# Patient Record
Sex: FEMALE | Race: WHITE | Employment: UNEMPLOYED | URBAN - METROPOLITAN AREA
[De-identification: names, ages, dates, MRNs, and addresses within clinical notes are randomized per-mention and may not be internally consistent; named-entity substitution may affect disease eponyms.]

---

## 2017-02-22 DIAGNOSIS — I25.10 CORONARY ARTERY DISEASE INVOLVING NATIVE CORONARY ARTERY OF NATIVE HEART WITHOUT ANGINA PECTORIS: ICD-10-CM

## 2017-02-22 RX ORDER — ATORVASTATIN CALCIUM 40 MG/1
40 TABLET, FILM COATED ORAL DAILY
Qty: 90 TABLET | Refills: 1 | Status: SHIPPED | OUTPATIENT
Start: 2017-02-22 | End: 2017-07-13

## 2017-02-23 DIAGNOSIS — I25.10 CORONARY ARTERY DISEASE INVOLVING NATIVE CORONARY ARTERY OF NATIVE HEART WITHOUT ANGINA PECTORIS: ICD-10-CM

## 2017-02-23 NOTE — TELEPHONE ENCOUNTER
Lisinopril      Last Written Prescription Date: 11/29/16  Last Fill Quantity: 90, # refills: 0  Last Office Visit with G, P or [facility] prescribing provider: 01/04/16       Potassium   Date Value Ref Range Status   09/15/2016 4.6 3.4 - 5.3 mmol/L Final     Creatinine   Date Value Ref Range Status   09/15/2016 0.78 0.52 - 1.04 mg/dL Final     BP Readings from Last 3 Encounters:   09/15/16 130/88   03/24/16 112/74   02/11/16 116/76

## 2017-02-27 RX ORDER — LISINOPRIL 5 MG/1
5 TABLET ORAL DAILY
Qty: 30 TABLET | Refills: 0 | Status: SHIPPED | OUTPATIENT
Start: 2017-02-27 | End: 2017-03-28

## 2017-03-07 DIAGNOSIS — F32.1 MAJOR DEPRESSIVE DISORDER, SINGLE EPISODE, MODERATE (H): ICD-10-CM

## 2017-03-07 NOTE — TELEPHONE ENCOUNTER
Lexapro     Last Written Prescription Date: 06/08/16  Last Fill Quantity: 90, # refills: 2  [place] [facility] [place]:  01/04/16        Last PHQ-9 score on record=   PHQ-9 SCORE 5/19/2015   Total Score 10

## 2017-03-09 RX ORDER — ESCITALOPRAM OXALATE 20 MG/1
20 TABLET ORAL DAILY
Qty: 90 TABLET | Refills: 0 | Status: SHIPPED | OUTPATIENT
Start: 2017-03-09 | End: 2017-06-05

## 2017-03-10 NOTE — TELEPHONE ENCOUNTER
Call and advise I will do one more fill but she has cancelled 2 appts. She will have to be seen before any more refills.

## 2017-03-13 DIAGNOSIS — R07.89 OTHER CHEST PAIN: ICD-10-CM

## 2017-03-13 NOTE — TELEPHONE ENCOUNTER
Amlodipine      Last Written Prescription Date: 02/11/16  Last Fill Quantity: 90, # refills: 3    Last Office Visit with G, P or [facility] prescribing provider:  01/04/16   Future Office Visit:        BP Readings from Last 3 Encounters:   09/15/16 130/88   03/24/16 112/74   02/11/16 116/76

## 2017-03-14 RX ORDER — AMLODIPINE BESYLATE 5 MG/1
5 TABLET ORAL DAILY
Qty: 30 TABLET | Refills: 0 | Status: SHIPPED | OUTPATIENT
Start: 2017-03-14 | End: 2017-04-06

## 2017-03-14 NOTE — TELEPHONE ENCOUNTER
Pt called and reached VM  Message left to call back to make and keep appt with Dr. Carter.  No more refills until an appt is completed.    Randy WILSON RN

## 2017-03-28 DIAGNOSIS — I25.10 CORONARY ARTERY DISEASE INVOLVING NATIVE CORONARY ARTERY OF NATIVE HEART WITHOUT ANGINA PECTORIS: ICD-10-CM

## 2017-03-28 RX ORDER — LISINOPRIL 5 MG/1
5 TABLET ORAL DAILY
Qty: 30 TABLET | Refills: 0 | Status: SHIPPED | OUTPATIENT
Start: 2017-03-28 | End: 2017-04-06

## 2017-03-28 NOTE — TELEPHONE ENCOUNTER
Lisinopril      Last Written Prescription Date: 02/27/17  Last Fill Quantity: 30, # refills: 0  Last Office Visit with G, P or [facility] prescribing provider: 01/04/16  Next 5 appointments (look out 90 days)     Apr 06, 2017  8:20 AM CDT   PHYSICAL with Gabriella Carter MD   [facility] ([facility]    [address]  [facility] [id number] [phone]                   Potassium   Date Value Ref Range Status   09/15/2016 4.6 3.4 - 5.3 mmol/L Final     Creatinine   Date Value Ref Range Status   09/15/2016 0.78 0.52 - 1.04 mg/dL Final     BP Readings from Last 3 Encounters:   09/15/16 130/88   03/24/16 112/74   02/11/16 116/76     Medication is being filled for 1 time refill only due to:  upcoming appt

## 2017-04-06 ENCOUNTER — OFFICE VISIT (OUTPATIENT)
Dept: INTERNAL MEDICINE | Facility: CLINIC | Age: 52
End: 2017-04-06
Payer: COMMERCIAL

## 2017-04-06 VITALS
BODY MASS INDEX: 24.92 KG/M2 | SYSTOLIC BLOOD PRESSURE: 100 MMHG | TEMPERATURE: 98 F | HEART RATE: 74 BPM | HEIGHT: 64 IN | WEIGHT: 146 LBS | OXYGEN SATURATION: 96 % | DIASTOLIC BLOOD PRESSURE: 70 MMHG

## 2017-04-06 DIAGNOSIS — R10.13 EPIGASTRIC PAIN: ICD-10-CM

## 2017-04-06 DIAGNOSIS — R07.89 OTHER CHEST PAIN: ICD-10-CM

## 2017-04-06 DIAGNOSIS — Z12.31 ENCOUNTER FOR SCREENING MAMMOGRAM FOR BREAST CANCER: ICD-10-CM

## 2017-04-06 DIAGNOSIS — F33.41 RECURRENT MAJOR DEPRESSIVE DISORDER, IN PARTIAL REMISSION (H): ICD-10-CM

## 2017-04-06 DIAGNOSIS — Z12.11 SCREEN FOR COLON CANCER: ICD-10-CM

## 2017-04-06 DIAGNOSIS — I25.10 CORONARY ARTERY DISEASE INVOLVING NATIVE CORONARY ARTERY OF NATIVE HEART WITHOUT ANGINA PECTORIS: ICD-10-CM

## 2017-04-06 DIAGNOSIS — R73.09 ABNORMAL BLOOD SUGAR: ICD-10-CM

## 2017-04-06 DIAGNOSIS — E78.5 HYPERLIPIDEMIA LDL GOAL <70: ICD-10-CM

## 2017-04-06 DIAGNOSIS — R53.83 FATIGUE, UNSPECIFIED TYPE: ICD-10-CM

## 2017-04-06 DIAGNOSIS — Z00.00 ENCOUNTER FOR ROUTINE ADULT HEALTH EXAMINATION WITHOUT ABNORMAL FINDINGS: Primary | ICD-10-CM

## 2017-04-06 LAB
ANION GAP SERPL CALCULATED.3IONS-SCNC: 4 MMOL/L (ref 3–14)
BASOPHILS # BLD AUTO: 0 10E9/L (ref 0–0.2)
BASOPHILS NFR BLD AUTO: 0.3 %
BUN SERPL-MCNC: 15 MG/DL (ref 7–30)
CALCIUM SERPL-MCNC: 9.2 MG/DL (ref 8.5–10.1)
CHLORIDE SERPL-SCNC: 107 MMOL/L (ref 94–109)
CHOLEST SERPL-MCNC: 167 MG/DL
CO2 SERPL-SCNC: 29 MMOL/L (ref 20–32)
CREAT SERPL-MCNC: 0.86 MG/DL (ref 0.52–1.04)
DIFFERENTIAL METHOD BLD: NORMAL
EOSINOPHIL # BLD AUTO: 0.2 10E9/L (ref 0–0.7)
EOSINOPHIL NFR BLD AUTO: 3 %
ERYTHROCYTE [DISTWIDTH] IN BLOOD BY AUTOMATED COUNT: 13.8 % (ref 10–15)
GFR SERPL CREATININE-BSD FRML MDRD: 69 ML/MIN/1.7M2
GLUCOSE SERPL-MCNC: 114 MG/DL (ref 70–99)
HBA1C MFR BLD: 5.9 % (ref 4.3–6)
HCT VFR BLD AUTO: 42.5 % (ref 35–47)
HDLC SERPL-MCNC: 55 MG/DL
HGB BLD-MCNC: 14 G/DL (ref 11.7–15.7)
LDLC SERPL CALC-MCNC: 67 MG/DL
LYMPHOCYTES # BLD AUTO: 2.5 10E9/L (ref 0.8–5.3)
LYMPHOCYTES NFR BLD AUTO: 39.3 %
MCH RBC QN AUTO: 30.8 PG (ref 26.5–33)
MCHC RBC AUTO-ENTMCNC: 32.9 G/DL (ref 31.5–36.5)
MCV RBC AUTO: 93 FL (ref 78–100)
MONOCYTES # BLD AUTO: 0.6 10E9/L (ref 0–1.3)
MONOCYTES NFR BLD AUTO: 9.8 %
NEUTROPHILS # BLD AUTO: 3.1 10E9/L (ref 1.6–8.3)
NEUTROPHILS NFR BLD AUTO: 47.6 %
NONHDLC SERPL-MCNC: 112 MG/DL
PLATELET # BLD AUTO: 273 10E9/L (ref 150–450)
POTASSIUM SERPL-SCNC: 4.3 MMOL/L (ref 3.4–5.3)
RBC # BLD AUTO: 4.55 10E12/L (ref 3.8–5.2)
SODIUM SERPL-SCNC: 140 MMOL/L (ref 133–144)
T4 FREE SERPL-MCNC: 0.83 NG/DL (ref 0.76–1.46)
TRIGL SERPL-MCNC: 227 MG/DL
TSH SERPL DL<=0.005 MIU/L-ACNC: 10.94 MU/L (ref 0.4–4)
WBC # BLD AUTO: 6.4 10E9/L (ref 4–11)

## 2017-04-06 PROCEDURE — 84439 ASSAY OF FREE THYROXINE: CPT | Performed by: INTERNAL MEDICINE

## 2017-04-06 PROCEDURE — 36415 COLL VENOUS BLD VENIPUNCTURE: CPT | Performed by: INTERNAL MEDICINE

## 2017-04-06 PROCEDURE — 80061 LIPID PANEL: CPT | Performed by: INTERNAL MEDICINE

## 2017-04-06 PROCEDURE — 99396 PREV VISIT EST AGE 40-64: CPT | Performed by: INTERNAL MEDICINE

## 2017-04-06 PROCEDURE — 84443 ASSAY THYROID STIM HORMONE: CPT | Performed by: INTERNAL MEDICINE

## 2017-04-06 PROCEDURE — 85025 COMPLETE CBC W/AUTO DIFF WBC: CPT | Performed by: INTERNAL MEDICINE

## 2017-04-06 PROCEDURE — 86803 HEPATITIS C AB TEST: CPT | Performed by: INTERNAL MEDICINE

## 2017-04-06 PROCEDURE — 80048 BASIC METABOLIC PNL TOTAL CA: CPT | Performed by: INTERNAL MEDICINE

## 2017-04-06 PROCEDURE — G0145 SCR C/V CYTO,THINLAYER,RESCR: HCPCS | Performed by: INTERNAL MEDICINE

## 2017-04-06 PROCEDURE — 99213 OFFICE O/P EST LOW 20 MIN: CPT | Mod: 25 | Performed by: INTERNAL MEDICINE

## 2017-04-06 PROCEDURE — 87624 HPV HI-RISK TYP POOLED RSLT: CPT | Performed by: INTERNAL MEDICINE

## 2017-04-06 PROCEDURE — 83036 HEMOGLOBIN GLYCOSYLATED A1C: CPT | Performed by: INTERNAL MEDICINE

## 2017-04-06 RX ORDER — LISINOPRIL 5 MG/1
5 TABLET ORAL DAILY
Qty: 90 TABLET | Refills: 3 | Status: CANCELLED | OUTPATIENT
Start: 2017-04-06

## 2017-04-06 RX ORDER — ESCITALOPRAM OXALATE 20 MG/1
20 TABLET ORAL DAILY
Qty: 90 TABLET | Refills: 3 | Status: CANCELLED | OUTPATIENT
Start: 2017-04-06

## 2017-04-06 RX ORDER — ATORVASTATIN CALCIUM 40 MG/1
40 TABLET, FILM COATED ORAL DAILY
Qty: 90 TABLET | Refills: 1 | Status: CANCELLED | OUTPATIENT
Start: 2017-04-06

## 2017-04-06 RX ORDER — LISINOPRIL 5 MG/1
5 TABLET ORAL DAILY
Qty: 90 TABLET | Refills: 1 | Status: SHIPPED | OUTPATIENT
Start: 2017-04-06 | End: 2017-11-01

## 2017-04-06 RX ORDER — CARVEDILOL 6.25 MG/1
6.25 TABLET ORAL 2 TIMES DAILY WITH MEALS
Qty: 180 TABLET | Refills: 3 | Status: CANCELLED | OUTPATIENT
Start: 2017-04-06

## 2017-04-06 RX ORDER — AMLODIPINE BESYLATE 5 MG/1
5 TABLET ORAL DAILY
Qty: 90 TABLET | Refills: 1 | Status: SHIPPED | OUTPATIENT
Start: 2017-04-06 | End: 2017-10-03

## 2017-04-06 RX ORDER — AMLODIPINE BESYLATE 5 MG/1
5 TABLET ORAL DAILY
Qty: 90 TABLET | Refills: 3 | Status: CANCELLED | OUTPATIENT
Start: 2017-04-06

## 2017-04-06 NOTE — MR AVS SNAPSHOT
After Visit Summary   4/6/2017    Fallon Pizano    MRN: [id number]           Patient Information     Date Of Birth          1965        Visit Information        Provider Department      4/6/2017 8:20 AM Gabriella Carter MD [facility]        Today's Diagnoses     Encounter for routine adult health examination without abnormal findings    -  1    Screen for colon cancer        Encounter for screening mammogram for breast cancer        Abnormal blood sugar        Hyperlipidemia LDL goal <70        Coronary artery disease involving native coronary artery of native heart without angina pectoris        Recurrent major depressive disorder, in partial remission (H)        Fatigue, unspecified type          Care Instructions    Decrease lexapro to 10 mg (1/2 tablet) daily for up to a week. If still sleep issues you can try stopping it. Call with update after that.     Try omeprazole 20 mg daily; start with a 2 week pack. If not helping by the end, let me know.   If helps, you may need to take for 6 weeks. If symptoms return, try famotidine (a milder acid blocker) 20 mg daily.      PREVENTIVE HEALTH RECOMMENDATIONS:     Vaccines: Get a flu shot each year. Get a tetanus shot every 10 years.     Exercise for at least 150 minutes a week (an average of 30 minutes a day, 5 days of the week). This will help you control your weight and prevent disease.    Limit alcohol to one drink per day.    No smoking.     Wear sunscreen to prevent skin cancer.     See your dentist twice a year for an exam and cleaning.    Try to get Calcium 1200 mg total per day. It is best to not take it all at once. Try to get Vitamin D at least 1000 units per day.    BMI or Body Mass Index is a way of indicating weight and health risk for cardiovascular diseases, high blood pressure, diabetes.   Definitions:    Underweight is less than 18.5 and will be associated with health risk.   Normal BMI is 18.5 to  25   Overweight is 25-29   Obesity is 30 or greater   Morbid Obesity is 40 or greater or 35 or greater with diabetes or high blood pressure  Obesity and Morbid Obesity are associated with higher health risks. Lowering calories, exercising more may lower your BMI and even small decreases can have positive impact on lowering health risks.   Your Body mass index is 25.06 kg/(m^2)..           Follow-ups after your visit        Additional Services     GASTROENTEROLOGY ADULT REF PROCEDURE ONLY       Last Lab Result: Creatinine (mg/dL)       Date                     Value                 09/15/2016               0.78             ----------  There is no height or weight on file to calculate BMI.     Needed:  No  Language:  English    Patient will be contacted to schedule procedure.     Please be aware that coverage of these services is subject to the terms and limitations of your health insurance plan.  Call member services at your health plan with any benefit or coverage questions.  Any procedures must be performed at a [place] facility OR coordinated by your clinic's referral office.    Please bring the following with you to your appointment:    (1) Any X-Rays, CTs or MRIs which have been performed.  Contact the facility where they were done to arrange for  prior to your scheduled appointment.    (2) List of current medications   (3) This referral request   (4) Any documents/labs given to you for this referral                  Future tests that were ordered for you today     Open Future Orders        Priority Expected Expires Ordered    Mammo Screening digital (bilat) Routine  4/6/2018 4/6/2017            Who to contact     If you have questions or need follow up information about today's clinic visit or your schedule please contact [facility] directly at [phone].  Normal or non-critical lab and imaging results will be communicated to you by MyChart, letter or phone within 4  "business days after the clinic has received the results. If you do not hear from us within 7 days, please contact the clinic through [employer]Page[employer] or phone. If you have a critical or abnormal lab result, we will notify you by phone as soon as possible.  Submit refill requests through [employer] or call your pharmacy and they will forward the refill request to us. Please allow 3 business days for your refill to be completed.          Additional Information About Your Visit        OnePage[employer]M Information     [employer] lets you send messages to your doctor, view your test results, renew your prescriptions, schedule appointments and more. To sign up, go to [email or web address] . Click on \"Log in\" on the left side of the screen, which will take you to the Welcome page. Then click on \"Sign up Now\" on the right side of the page.     You will be asked to enter the access code listed below, as well as some personal information. Please follow the directions to create your username and password.     Your access code is: [email or web address]  Expires: 2017  9:25 AM     Your access code will  in 90 days. If you need help or a new code, please call your [place] clinic or [phone].        Care EveryWhere ID     This is your Care EveryWhere ID. This could be used by other organizations to access your [place] medical records  [id number]        Your Vitals Were     Pulse Temperature Height Last Period Pulse Oximetry BMI (Body Mass Index)    74 98  F (36.7  C) (Oral) 5' 4\" (1.626 m) 2013 96% 25.06 kg/m2       Blood Pressure from Last 3 Encounters:   17 100/70   09/15/16 130/88   16 112/74    Weight from Last 3 Encounters:   17 146 lb (66.2 kg)   16 143 lb 9.6 oz (65.1 kg)   16 143 lb 11.2 oz (65.2 kg)              We Performed the Following     Basic metabolic panel     CBC with platelets differential     GASTROENTEROLOGY ADULT REF PROCEDURE ONLY     Hemoglobin A1c     Hepatitis C Screen Reflex to HCV " RNA Quant and Genotype     HPV High Risk Types DNA Cervical     Lipid Profile with reflex to direct LDL     Pap imaged thin layer screen with HPV - recommended age 30 - 65     TSH with free T4 reflex          Today's Medication Changes          These changes are accurate as of: 4/6/17  9:25 AM.  If you have any questions, ask your nurse or doctor.               Stop taking these medicines if you haven't already. Please contact your care team if you have questions.     diphenhydrAMINE 25 MG capsule   Commonly known as:  BENADRYL   Stopped by:  Gabriella Carter MD           oxyCODONE 5 MG IR tablet   Commonly known as:  ROXICODONE   Stopped by:  Gabriella Carter MD                    Primary Care Provider Office Phone # Fax #    Gabriella Carter [phone]       [facility] [address]  [facility] [address]        Thank you!     Thank you for choosing [facility]  for your care. Our goal is always to provide you with excellent care. Hearing back from our patients is one way we can continue to improve our services. Please take a few minutes to complete the written survey that you may receive in the mail after your visit with us. Thank you!             Your Updated Medication List - Protect others around you: Learn how to safely use, store and throw away your medicines at www.disposemymeds.org.          This list is accurate as of: 4/6/17  9:25 AM.  Always use your most recent med list.                   Brand Name Dispense Instructions for use    amLODIPine 5 MG tablet    NORVASC    30 tablet    Take 1 tablet (5 mg) by mouth daily       aspirin 81 MG tablet     90 tablet    Take 1 tablet by mouth daily.       atorvastatin 40 MG tablet    LIPITOR    90 tablet    Take 1 tablet (40 mg) by mouth daily       carvedilol 6.25 MG tablet    COREG    180 tablet    Take 1 tablet (6.25 mg) by mouth 2 times daily (with meals)       escitalopram 20 MG tablet    LEXAPRO    90 tablet     Take 1 tablet (20 mg) by mouth daily 6/8/16 Pt is due back to clinic for appt/labs March 2017       lisinopril 5 MG tablet    PRINIVIL/ZESTRIL    30 tablet    Take 1 tablet (5 mg) by mouth daily       melatonin 3 MG tablet      Take 3 mg by mouth nightly as needed for sleep       nitroglycerin 0.4 MG sublingual tablet    NITROSTAT    25 tablet    Place 1 tablet (0.4 mg) under the tongue every 5 minutes as needed for chest pain

## 2017-04-06 NOTE — PATIENT INSTRUCTIONS
Decrease lexapro to 10 mg (1/2 tablet) daily for up to a week. If still sleep issues you can try stopping it. Call with update after that.     Try omeprazole 20 mg daily; start with a 2 week pack. If not helping by the end, let me know.   If helps, you may need to take for 6 weeks. If symptoms return, try famotidine (a milder acid blocker) 20 mg daily.      PREVENTIVE HEALTH RECOMMENDATIONS:     Vaccines: Get a flu shot each year. Get a tetanus shot every 10 years.     Exercise for at least 150 minutes a week (an average of 30 minutes a day, 5 days of the week). This will help you control your weight and prevent disease.    Limit alcohol to one drink per day.    No smoking.     Wear sunscreen to prevent skin cancer.     See your dentist twice a year for an exam and cleaning.    Try to get Calcium 1200 mg total per day. It is best to not take it all at once. Try to get Vitamin D at least 1000 units per day.    BMI or Body Mass Index is a way of indicating weight and health risk for cardiovascular diseases, high blood pressure, diabetes.   Definitions:    Underweight is less than 18.5 and will be associated with health risk.   Normal BMI is 18.5 to 25   Overweight is 25-29   Obesity is 30 or greater   Morbid Obesity is 40 or greater or 35 or greater with diabetes or high blood pressure  Obesity and Morbid Obesity are associated with higher health risks. Lowering calories, exercising more may lower your BMI and even small decreases can have positive impact on lowering health risks.   Your Body mass index is 25.06 kg/(m^2)..

## 2017-04-06 NOTE — PROGRESS NOTES
SUBJECTIVE:     CC: Fallon Pizano is an 51 year old woman who presents for preventive health visit.     Healthy Habits:    Do you get at least three servings of calcium containing foods daily (dairy, green leafy vegetables, etc.)? yes    Amount of exercise or daily activities, outside of work: no    Problems taking medications regularly No    Medication side effects: No    Have you had an eye exam in the past two years? yes    Do you see a dentist twice per year? yes    Do you have sleep apnea, excessive snoring or daytime drowsiness?no      Current concerns:   1. Fatigue: some issues with getting to sleep. She is aware of weird dreams, awakens several times. This has been going on a long time. She feels very tired during the day, makes it hard to feel motivated to do things.   2. Epigastric pain: going on for over 6 months. It can be sharp, comes and goes, not every day. No aggravating factors. Duration: few hours. She has not taken anything for it. No nausea. Present most days. Not related to movements.       Today's PHQ-2 Score:   PHQ-2 ( 1999 Pf[employer]) 1/4/2016 5/19/2015   Q1: Little interest or pleasure in doing things 0 2   Q2: Feeling down, depressed or hopeless 0 2   PHQ-2 Score 0 4       Abuse: Current or Past(Physical, Sexual or Emotional)- No  Do you feel safe in your environment - Yes    Social History   Substance Use Topics     Smoking status: Current Some Day Smoker     Types: Cigarettes     Last attempt to quit: 4/2/2011     Smokeless tobacco: Never Used      Comment: smokes occ     Alcohol use 0.0 oz/week     0 Standard drinks or equivalent per week      Comment: Casual     The patient does not drink >3 drinks per day nor >7 drinks per week.    Recent Labs   Lab Test  01/04/16   1019  06/24/15   0814  11/04/14   0949   CHOL  185  171  168   HDL  61  57  56   LDL  73  68  78   TRIG  253*  230*  172*   CHOLHDLRATIO   --   3.0  3.0   NHDL  124   --    --        Reviewed orders with patient.   Reviewed health maintenance and updated orders accordingly - Yes    Mammo Decision Support:  Patient over age 50, mutual decision to screen reflected in health maintenance.    Pertinent mammograms are reviewed under the imaging tab.  History of abnormal Pap smear: NO - age 30- 65 PAP every 3 years recommended    Reviewed and updated as needed this visit by clinical staff  Tobacco  Allergies  Problems  Med Hx  Surg Hx  Fam Hx  Soc Hx      Patient Active Problem List   Diagnosis     Hyperlipidemia LDL goal <70     Major depression     CAD (coronary artery disease)     Allergic rhinitis     Endometrial cells on cervical Pap smear inconsistent w/LMP     Abnormal blood sugar     Current Outpatient Prescriptions   Medication Sig Dispense Refill     lisinopril (PRINIVIL/ZESTRIL) 5 MG tablet Take 1 tablet (5 mg) by mouth daily 30 tablet 0     amLODIPine (NORVASC) 5 MG tablet Take 1 tablet (5 mg) by mouth daily 30 tablet 0     escitalopram (LEXAPRO) 20 MG tablet Take 1 tablet (20 mg) by mouth daily 6/8/16 Pt is due back to clinic for appt/labs March 2017 90 tablet 0     atorvastatin (LIPITOR) 40 MG tablet Take 1 tablet (40 mg) by mouth daily 90 tablet 1     carvedilol (COREG) 6.25 MG tablet Take 1 tablet (6.25 mg) by mouth 2 times daily (with meals) 180 tablet 3     nitroglycerin (NITROSTAT) 0.4 MG SL tablet Place 1 tablet (0.4 mg) under the tongue every 5 minutes as needed for chest pain 25 tablet 0     aspirin 81 MG tablet Take 1 tablet by mouth daily. 90 tablet 3     melatonin 3 MG tablet Take 3 mg by mouth nightly as needed for sleep        Review Of Systems:  Skin: negative  Eyes: negative  Ears/Nose/Throat: nasal congestion, seasonally, more winter takes benadryl for it   Respiratory: No dyspnea on exertion and No cough  Cardiovascular: negative  Gastrointestinal: as above  Genitourinary: negative  Musculoskeletal: negative  Neurologic: negative  Psychiatric: as above  Hematologic/Lymphatic/Immunologic:  "negative  Endocrine: negative   Gynecologic ros reveals:no breast pain or new or enlarging lumps on self exam, no vaginal bleeding, no discharge or pelvic pain    Objective:  Patient alert, in no acute distress  /70  Pulse 74  Temp 98  F (36.7  C) (Oral)  Ht 5' 4\" (1.626 m)  Wt 146 lb (66.2 kg)  LMP 07/18/2013  SpO2 96%  BMI 25.06 kg/m2    HEENT: extraocular movements are intact, pupils equal and reactive to light and accommodation, TMs clear, oropharynx clear  NECK: Neck supple. No adenopathy. Thyroid symmetric, normal size,, Carotids without bruits.  PULMONARY: clear to auscultation  CARDIAC: regular rate and rhythm and no murmurs, clicks, or gallops  PULSES: 2/2 throughout  BACK: no spinal or CVAT  ABDOMINAL: Soft, tender mid-epigastrium without rebound or guarding  Normal bowel sounds.  No hepatosplenomegaly or abnormal masses  BREAST: No breast masses or tenderness, No axillary masses or tenderness and No galactorrhea  PELVIC: external genitalia normal, vaginal mucosa normal, cervix normal, specimen sent for pap, bimanual exam with normal uterus and adnexa,  REFLEXES: 2+ throughout  SKIN: unremarkable     PHQ-9 SCORE 11/4/2014 5/19/2015 4/6/2017   Total Score 3 10 -   Total Score - - 11        ASSESSMENT/PLAN:     1. Encounter for routine adult health examination without abnormal findings    - Basic metabolic panel  - Lipid Profile with reflex to direct LDL  - Hepatitis C Screen Reflex to HCV RNA Quant and Genotype    2. Screen for colon cancer  due  - GASTROENTEROLOGY ADULT REF PROCEDURE ONLY    3. Encounter for screening mammogram for breast cancer  due  - Mammo Screening digital (bilat); Future    4. Abnormal blood sugar  recheck  - Basic metabolic panel  - Hemoglobin A1c    5. Hyperlipidemia LDL goal <70  recheck  - Lipid Profile with reflex to direct LDL    6. Coronary artery disease involving native coronary artery of native heart without angina pectoris  stable    7. Recurrent major " "depressive disorder, in partial remission (H)  Not optimal but may be mostly related to sleep issues which could be due to the Lexapro, hold to see if helps, see patient instructions. It may be worsening depression causing sleep problems though. Consider change in medication.     8. Fatigue, unspecified type  Most likely related to sleep, check a few labs  - TSH with free T4 reflex  - CBC with platelets differential    9. Epigastric pain; may be acid related, trial PPI, consider US if not better, see patient instructions.     COUNSELING:   Reviewed preventive health counseling, as reflected in patient instructions       Consider Hep C screening for patients born between 1945 and 1965         reports that she has been smoking Cigarettes.  She has never used smokeless tobacco.    Estimated body mass index is 24.65 kg/(m^2) as calculated from the following:    Height as of 3/24/16: 5' 4\" (1.626 m).    Weight as of 3/24/16: 143 lb 9.6 oz (65.1 kg).       Counseling Resources:  ATP IV Guidelines  Pooled Cohorts Equation Calculator  Breast Cancer Risk Calculator  FRAX Risk Assessment  ICSI Preventive Guidelines  Dietary Guidelines for Americans, 2010  [employer]'s MyPlate  ASA Prophylaxis  Lung CA Screening    Gabriella Carter MD  [facility]  "

## 2017-04-06 NOTE — NURSING NOTE
"Chief Complaint   Patient presents with     Physical       Initial /70  Pulse 74  Temp 98  F (36.7  C) (Oral)  Ht 5' 4\" (1.626 m)  Wt 146 lb (66.2 kg)  LMP 07/18/2013  SpO2 96%  BMI 25.06 kg/m2 Estimated body mass index is 25.06 kg/(m^2) as calculated from the following:    Height as of this encounter: 5' 4\" (1.626 m).    Weight as of this encounter: 146 lb (66.2 kg).  Medication Reconciliation: complete    "

## 2017-04-07 ENCOUNTER — TELEPHONE (OUTPATIENT)
Dept: INTERNAL MEDICINE | Facility: CLINIC | Age: 52
End: 2017-04-07

## 2017-04-07 DIAGNOSIS — E03.9 ACQUIRED HYPOTHYROIDISM: Primary | ICD-10-CM

## 2017-04-07 LAB — HCV AB SERPL QL IA: NORMAL

## 2017-04-07 RX ORDER — LEVOTHYROXINE SODIUM 25 UG/1
25 TABLET ORAL DAILY
Qty: 90 TABLET | Refills: 1 | Status: SHIPPED | OUTPATIENT
Start: 2017-04-07 | End: 2017-10-27 | Stop reason: DRUGHIGH

## 2017-04-07 ASSESSMENT — PATIENT HEALTH QUESTIONNAIRE - PHQ9: SUM OF ALL RESPONSES TO PHQ QUESTIONS 1-9: 11

## 2017-04-07 NOTE — TELEPHONE ENCOUNTER
Call and advise that her thyroid tests suggest her thyroid gland is not working adequately. This may be causing at least some of her symptoms. She should start on replacement with levothyroxine 25 mcg daily; check instructions to take correctly. Recheck thyroid lab in 6-8 weeks, just a lab only appointment, not fasting.   Her sugar is still above normal but not diabetes. The rest are overall okay. Copy of labs in my basket to mail to her.

## 2017-04-10 LAB
COPATH REPORT: NORMAL
PAP: NORMAL

## 2017-04-12 LAB
FINAL DIAGNOSIS: NORMAL
HPV HR 12 DNA CVX QL NAA+PROBE: NEGATIVE
HPV16 DNA SPEC QL NAA+PROBE: NEGATIVE
HPV18 DNA SPEC QL NAA+PROBE: NEGATIVE
SPECIMEN DESCRIPTION: NORMAL

## 2017-04-12 NOTE — PROGRESS NOTES
Pap done on 4/6/17. Please send nl pap and Neg HPV letter, ([id number]) for pap in 3 years and annual physical in 1 year.   Chart reviewed,  oj.     VALERY Lozano RN

## 2017-05-04 DIAGNOSIS — I25.10 CORONARY ARTERY DISEASE INVOLVING NATIVE CORONARY ARTERY OF NATIVE HEART WITHOUT ANGINA PECTORIS: ICD-10-CM

## 2017-05-05 RX ORDER — LISINOPRIL 5 MG/1
TABLET ORAL
Qty: 90 TABLET | Refills: 0 | OUTPATIENT
Start: 2017-05-05

## 2017-05-13 DIAGNOSIS — I25.10 CAD (CORONARY ARTERY DISEASE): ICD-10-CM

## 2017-05-15 RX ORDER — CARVEDILOL 6.25 MG/1
TABLET ORAL
Qty: 180 TABLET | Refills: 1 | Status: SHIPPED | OUTPATIENT
Start: 2017-05-15 | End: 2017-07-13

## 2017-05-15 NOTE — TELEPHONE ENCOUNTER
Carvedilol       Last Written Prescription Date: 5/9/16  Last Fill Quantity: 180, # refills: 3  Last Office Visit with [facility] or [facility] prescribing provider:  4/6/17   Future Office Visit:        BP Readings from Last 3 Encounters:   04/06/17 100/70   09/15/16 130/88   03/24/16 112/74     Prescription approved per [facility] Refill Protocol.

## 2017-06-05 DIAGNOSIS — F32.1 MAJOR DEPRESSIVE DISORDER, SINGLE EPISODE, MODERATE (H): ICD-10-CM

## 2017-06-05 NOTE — TELEPHONE ENCOUNTER
LEXAPRO     Last Written Prescription Date: 03/09/17  Last Fill Quantity: 90, # refills: 0  Last Office Visit with [facility] primary care provider:  04/06/17        Last PHQ-9 score on record=   PHQ-9 SCORE 4/6/2017   Total Score -   Total Score 11

## 2017-06-06 RX ORDER — ESCITALOPRAM OXALATE 20 MG/1
20 TABLET ORAL DAILY
Qty: 90 TABLET | Refills: 0 | Status: SHIPPED | OUTPATIENT
Start: 2017-06-06 | End: 2017-07-13

## 2017-06-06 NOTE — TELEPHONE ENCOUNTER
Per 4/7/17 phone note-Call and advise that her thyroid tests suggest her thyroid gland is not working adequately. This may be causing at least some of her symptoms. She should start on replacement with levothyroxine 25 mcg daily; check instructions to take correctly. Recheck thyroid lab in 6-8 weeks, just a lab only appointment, not fasting.   Her sugar is still above normal but not diabetes. The rest are overall okay. Copy of labs in my basket to mail to her.

## 2017-07-13 ENCOUNTER — OFFICE VISIT (OUTPATIENT)
Dept: INTERNAL MEDICINE | Facility: CLINIC | Age: 52
End: 2017-07-13
Payer: COMMERCIAL

## 2017-07-13 VITALS
TEMPERATURE: 98.2 F | DIASTOLIC BLOOD PRESSURE: 66 MMHG | HEIGHT: 64 IN | OXYGEN SATURATION: 96 % | HEART RATE: 81 BPM | WEIGHT: 150.1 LBS | SYSTOLIC BLOOD PRESSURE: 92 MMHG | RESPIRATION RATE: 16 BRPM | BODY MASS INDEX: 25.62 KG/M2

## 2017-07-13 DIAGNOSIS — Z12.11 SCREEN FOR COLON CANCER: ICD-10-CM

## 2017-07-13 DIAGNOSIS — Z12.31 ENCOUNTER FOR SCREENING MAMMOGRAM FOR BREAST CANCER: ICD-10-CM

## 2017-07-13 DIAGNOSIS — E03.9 ACQUIRED HYPOTHYROIDISM: ICD-10-CM

## 2017-07-13 DIAGNOSIS — I25.10 CORONARY ARTERY DISEASE INVOLVING NATIVE CORONARY ARTERY OF NATIVE HEART WITHOUT ANGINA PECTORIS: ICD-10-CM

## 2017-07-13 DIAGNOSIS — F33.0 MILD EPISODE OF RECURRENT MAJOR DEPRESSIVE DISORDER (H): ICD-10-CM

## 2017-07-13 DIAGNOSIS — E78.5 HYPERLIPIDEMIA LDL GOAL <70: ICD-10-CM

## 2017-07-13 DIAGNOSIS — G47.00 INSOMNIA, UNSPECIFIED TYPE: Primary | ICD-10-CM

## 2017-07-13 PROCEDURE — 84443 ASSAY THYROID STIM HORMONE: CPT | Performed by: INTERNAL MEDICINE

## 2017-07-13 PROCEDURE — 36415 COLL VENOUS BLD VENIPUNCTURE: CPT | Performed by: INTERNAL MEDICINE

## 2017-07-13 PROCEDURE — 99214 OFFICE O/P EST MOD 30 MIN: CPT | Performed by: INTERNAL MEDICINE

## 2017-07-13 RX ORDER — ZOLPIDEM TARTRATE 5 MG/1
5 TABLET ORAL
Qty: 30 TABLET | Refills: 1 | Status: SHIPPED | OUTPATIENT
Start: 2017-07-13 | End: 2020-03-31

## 2017-07-13 RX ORDER — ATORVASTATIN CALCIUM 40 MG/1
40 TABLET, FILM COATED ORAL DAILY
Qty: 90 TABLET | Refills: 3 | Status: SHIPPED | OUTPATIENT
Start: 2017-07-13 | End: 2018-08-20

## 2017-07-13 RX ORDER — ESCITALOPRAM OXALATE 20 MG/1
20 TABLET ORAL DAILY
Qty: 90 TABLET | Refills: 3 | Status: SHIPPED | OUTPATIENT
Start: 2017-07-13 | End: 2018-09-02

## 2017-07-13 RX ORDER — CARVEDILOL 6.25 MG/1
6.25 TABLET ORAL 2 TIMES DAILY WITH MEALS
Qty: 180 TABLET | Refills: 3 | Status: SHIPPED | OUTPATIENT
Start: 2017-07-13 | End: 2018-08-04

## 2017-07-13 NOTE — PROGRESS NOTES
SUBJECTIVE:                                                    Fallon Pizano is a 52 year old female who presents to clinic today for the following health issues:    Her main concern is malaise:  She reports that she has not been sleeping well for 3 weeks. She is not sure why she started to have trouble with sleep, just was not able to fall asleep, does not think she really has dosed or slept much, then would awaken and stay awake. This had not been bothering her too much the first week but after the second week she started to feel much more tired, malaise, decreased energy, achiness. She states she can finally fall asleep about 11 AM and sleep for 45 hours. She tried melatonin but it did not seem to help her get to sleep or staying asleep, Benadryl can help her sleep but it takes an hour or so to work and then she only gets about 5-6 hours duration. She does get some hangover effect from it and some headache. She has never been on any other sleep aid and has not had significant insomnia problems. She is not feeling like she's had significant stresses recently.    She reports some mild feeling of disequilibrium when up walking. She has occasionally felt mildly nauseous.  She is due for a thyroid recheck, does have some questions about thyroid failure, treatment.    Hyperlipidemia Follow-Up      Rate your low fat/cholesterol diet?: not monitoring fat    Taking statin?  Yes, muscle aches, possibly from other cause    Other lipid medications/supplements?:  none    Hypertension Follow-up      Outpatient blood pressures are being checked at home.  Results are 135/112 last week.    Low Salt Diet: low salt    Hypothyroidism Follow-up    Since last visit, patient describes the following symptoms: fatigue      Amount of exercise or physical activity: None    Problems taking medications regularly: No    Medication side effects: none    Diet: regular (no restrictions)        Patient Active Problem List   Diagnosis      Hyperlipidemia LDL goal <70     Mild episode of recurrent major depressive disorder (H)     CAD (coronary artery disease)     Allergic rhinitis     Abnormal blood sugar     Acquired hypothyroidism       Current Outpatient Prescriptions   Medication Sig Dispense Refill     IBUPROFEN PO Take 200 mg by mouth       DiphenhydrAMINE HCl (BENADRYL PO)        zolpidem (AMBIEN) 5 MG tablet Take 1 tablet (5 mg) by mouth nightly as needed for sleep 30 tablet 1     escitalopram (LEXAPRO) 20 MG tablet Take 1 tablet (20 mg) by mouth daily 90 tablet 0     levothyroxine (SYNTHROID/LEVOTHROID) 25 MCG tablet Take 1 tablet (25 mcg) by mouth daily 90 tablet 1     amLODIPine (NORVASC) 5 MG tablet Take 1 tablet (5 mg) by mouth daily 90 tablet 1     lisinopril (PRINIVIL/ZESTRIL) 5 MG tablet Take 1 tablet (5 mg) by mouth daily 90 tablet 1     atorvastatin (LIPITOR) 40 MG tablet Take 1 tablet (40 mg) by mouth daily 90 tablet 1     carvedilol (COREG) 6.25 MG tablet Take 1 tablet (6.25 mg) by mouth 2 times daily (with meals) 180 tablet 3     nitroglycerin (NITROSTAT) 0.4 MG SL tablet Place 1 tablet (0.4 mg) under the tongue every 5 minutes as needed for chest pain 25 tablet 0     aspirin 81 MG tablet Take 1 tablet by mouth daily. 90 tablet 3     [DISCONTINUED] carvedilol (COREG) 6.25 MG tablet TAKE 1 TABLET BY MOUTH 2 TIMES DAILY WITH MEALS 180 tablet 1     melatonin 3 MG tablet Take 3 mg by mouth nightly as needed for sleep           Social History   Substance Use Topics     Smoking status: Current Some Day Smoker     Types: Cigarettes     Last attempt to quit: 4/2/2011     Smokeless tobacco: Never Used      Comment: smokes occ     Alcohol use 0.0 oz/week     0 Standard drinks or equivalent per week      Comment: Casual        Reviewed and updated as needed this visit by clinical staff       Reviewed and updated as needed this visit by Provider         ROS:  No fever, chills, occasionally nausea, no vomiting, no abdominal pain, no  "constipation, diarrhea, rashes, specific joint pains, it's more muscular achiness. No new dyspnea on exertion, PND, orthopnea or chest pains, no palpitations or racing heartbeats. Her blood pressure was elevated 1 day early on, 135/112 when down gradually, it was in the normal range last time she checked a few weeks ago    OBJECTIVE:     BP 92/66  Pulse 81  Temp 98.2  F (36.8  C) (Oral)  Resp 16  Ht 5' 4\" (1.626 m)  Wt 150 lb 1.6 oz (68.1 kg)  LMP 07/18/2013  SpO2 96%  BMI 25.76 kg/m2  Body mass index is 25.76 kg/(m^2).    Neck: No lymphadenopathy or thyromegaly  Lungs: Clear  CV: normal S1, S2 without murmur, S3 or S4.   Abdomen: Bowel sounds normal, soft, nontender. No hepatosplenomegaly. No masses.   No edema     PHQ-9 SCORE 5/19/2015 4/6/2017 7/13/2017   Total Score 10 - -   Total Score - 11 13      She feels some of the mood issues are related to the lack of sleep, does not feel that her depression is significantly worse    ASSESSMENT/PLAN:             1. Insomnia, unspecified type  I suspect her son is causing much of her fatigue, body ache, malaise. Likely this is more of a stress reaction. Recommend start Ambien for 2 weeks, then wean off. Discussed behavior therapy for sleep problems. If continued issues may consider trazodone  - zolpidem (AMBIEN) 5 MG tablet; Take 1 tablet (5 mg) by mouth nightly as needed for sleep  Dispense: 30 tablet; Refill: 1    2. Mild episode of recurrent major depressive disorder (H)  Overall stable, continue med    3. Coronary artery disease involving native coronary artery of native heart without angina pectoris  Stable, continue med  - atorvastatin (LIPITOR) 40 MG tablet; Take 1 tablet (40 mg) by mouth daily  Dispense: 90 tablet; Refill: 3  - carvedilol (COREG) 6.25 MG tablet; Take 1 tablet (6.25 mg) by mouth 2 times daily (with meals)  Dispense: 180 tablet; Refill: 3    4. Acquired hypothyroidism  Recheck tests, may need adjustment of dose  - TSH with free T4 " reflex    5. Hyperlipidemia LDL goal <70  Stable, continue med    6. Encounter for screening mammogram for breast cancer  due  - MA Screening Digital Bilateral; Future    7. Screen for colon cancer  due  - GASTROENTEROLOGY ADULT REF PROCEDURE ONLY        Gabriella Carter MD  [facility]

## 2017-07-13 NOTE — MR AVS SNAPSHOT
"              After Visit Summary   7/13/2017    Fallon Pizano    MRN: [id number]           Patient Information     Date Of Birth          1965        Visit Information        Provider Department      7/13/2017 10:00 AM Gabriella Carter MD [facility]        Today's Diagnoses     Insomnia, unspecified type    -  1    Coronary artery disease involving native coronary artery of native heart without angina pectoris        Acquired hypothyroidism          Care Instructions    Take ambien 1 tablet every night for about 10 nights. Then take every other night for about a week, then try as needed.     If not sleeping with that, let me know.           Follow-ups after your visit        Who to contact     If you have questions or need follow up information about today's clinic visit or your schedule please contact [facility] directly at [phone].  Normal or non-critical lab and imaging results will be communicated to you by MyChart, letter or phone within 4 business days after the clinic has received the results. If you do not hear from us within 7 days, please contact the clinic through MyChart or phone. If you have a critical or abnormal lab result, we will notify you by phone as soon as possible.  Submit refill requests through yavalu or call your pharmacy and they will forward the refill request to us. Please allow 3 business days for your refill to be completed.          Additional Information About Your Visit        MyChart Information     yavalu lets you send messages to your doctor, view your test results, renew your prescriptions, schedule appointments and more. To sign up, go to [email or web address] . Click on \"Log in\" on the left side of the screen, which will take you to the Welcome page. Then click on \"Sign up Now\" on the right side of the page.     You will be asked to enter the access code listed below, as well as some personal information. Please follow " "the directions to create your username and password.     Your access code is: [email or web address]  Expires: 10/11/2017 10:55 AM     Your access code will  in 90 days. If you need help or a new code, please call your [facility] clinic or [phone].        Care EveryWhere ID     This is your Care EveryWhere ID. This could be used by other organizations to access your [facility] medical records  [id number]        Your Vitals Were     Pulse Temperature Respirations Height Last Period Pulse Oximetry    81 98.2  F (36.8  C) (Oral) 16 5' 4\" (1.626 m) 2013 96%    BMI (Body Mass Index)                   25.76 kg/m2            Blood Pressure from Last 3 Encounters:   17 92/66   17 100/70   09/15/16 130/88    Weight from Last 3 Encounters:   17 150 lb 1.6 oz (68.1 kg)   17 146 lb (66.2 kg)   16 143 lb 9.6 oz (65.1 kg)              We Performed the Following     TSH with free T4 reflex          Today's Medication Changes          These changes are accurate as of: 17 10:55 AM.  If you have any questions, ask your nurse or doctor.               Start taking these medicines.        Dose/Directions    zolpidem 5 MG tablet   Commonly known as:  AMBIEN   Used for:  Insomnia, unspecified type   Started by:  Gabriella Carter MD        Dose:  5 mg   Take 1 tablet (5 mg) by mouth nightly as needed for sleep   Quantity:  30 tablet   Refills:  1         These medicines have changed or have updated prescriptions.        Dose/Directions    carvedilol 6.25 MG tablet   Commonly known as:  COREG   This may have changed:  Another medication with the same name was removed. Continue taking this medication, and follow the directions you see here.   Used for:  Coronary artery disease involving native coronary artery of native heart without angina pectoris   Changed by:  Abhinav Dowd MD        Dose:  6.25 mg   Take 1 tablet (6.25 mg) by mouth 2 times daily (with meals)   Quantity:  180 tablet   Refills:  3    "         Where to get your medicines      Some of these will need a paper prescription and others can be bought over the counter.  Ask your nurse if you have questions.     Bring a paper prescription for each of these medications     zolpidem 5 MG tablet                Primary Care Provider Office Phone # Fax #    Gabriella Carter [phone]       [facility] [address] [facility] [address]  [facility] [address]        Equal Access to Services     BASIM VAN : Hadii aad ku hadasho Soomaali, waaxda luqadaha, qaybta kaalmada adeegyada, waxay idiin hayaan adeeg kharash lajamaal . So [facility] [phone].    ATENCIÓN: Si habla español, tiene a cheung disposición servicios gratuitos de asistencia lingüística. Aryan al [phone].    We comply with applicable federal civil rights laws and [place] laws. We do not discriminate on the basis of race, color, national origin, age, disability sex, sexual orientation or gender identity.            Thank you!     Thank you for choosing [facility]  for your care. Our goal is always to provide you with excellent care. Hearing back from our patients is one way we can continue to improve our services. Please take a few minutes to complete the written survey that you may receive in the mail after your visit with us. Thank you!             Your Updated Medication List - Protect others around you: Learn how to safely use, store and throw away your medicines at www.disposemymeds.org.          This list is accurate as of: 7/13/17 10:55 AM.  Always use your most recent med list.                   Brand Name Dispense Instructions for use Diagnosis    amLODIPine 5 MG tablet    NORVASC    90 tablet    Take 1 tablet (5 mg) by mouth daily    Other chest pain       aspirin 81 MG tablet     90 tablet    Take 1 tablet by mouth daily.        atorvastatin 40 MG tablet    LIPITOR    90 tablet    Take 1 tablet (40 mg) by mouth daily    Coronary artery disease involving  native coronary artery of native heart without angina pectoris       BENADRYL PO           carvedilol 6.25 MG tablet    COREG    180 tablet    Take 1 tablet (6.25 mg) by mouth 2 times daily (with meals)    Coronary artery disease involving native coronary artery of native heart without angina pectoris       escitalopram 20 MG tablet    LEXAPRO    90 tablet    Take 1 tablet (20 mg) by mouth daily    Major depressive disorder, single episode, moderate (H)       IBUPROFEN PO      Take 200 mg by mouth        levothyroxine 25 MCG tablet    SYNTHROID/LEVOTHROID    90 tablet    Take 1 tablet (25 mcg) by mouth daily    Acquired hypothyroidism       lisinopril 5 MG tablet    PRINIVIL/ZESTRIL    90 tablet    Take 1 tablet (5 mg) by mouth daily    Coronary artery disease involving native coronary artery of native heart without angina pectoris       melatonin 3 MG tablet      Take 3 mg by mouth nightly as needed for sleep        nitroGLYcerin 0.4 MG sublingual tablet    NITROSTAT    25 tablet    Place 1 tablet (0.4 mg) under the tongue every 5 minutes as needed for chest pain    CAD (coronary artery disease)       zolpidem 5 MG tablet    AMBIEN    30 tablet    Take 1 tablet (5 mg) by mouth nightly as needed for sleep    Insomnia, unspecified type

## 2017-07-13 NOTE — NURSING NOTE
"Chief Complaint   Patient presents with     Recheck Medication     LOV 04/06/2017- fasting     Sleep Problem     Unable to sleep for 3 weeks- not feeling well. Dizziness, nausea, body aching.        Initial BP 92/66  Pulse 81  Temp 98.2  F (36.8  C) (Oral)  Resp 16  Ht 5' 4\" (1.626 m)  Wt 150 lb 1.6 oz (68.1 kg)  LMP 07/18/2013  SpO2 96%  BMI 25.76 kg/m2 Estimated body mass index is 25.76 kg/(m^2) as calculated from the following:    Height as of this encounter: 5' 4\" (1.626 m).    Weight as of this encounter: 150 lb 1.6 oz (68.1 kg).  Medication Reconciliation: sara Messina CMA    Discussed Health Maintenance:    Patient agreed to schedule Mammogram and colonoscopy. Order have been place and information given to patient.       "

## 2017-07-13 NOTE — PATIENT INSTRUCTIONS
Take ambien 1 tablet every night for about 10 nights. Then take every other night for about a week, then try as needed.     If not sleeping with that, let me know.

## 2017-07-14 LAB — TSH SERPL DL<=0.005 MIU/L-ACNC: 3.36 MU/L (ref 0.4–4)

## 2017-07-14 ASSESSMENT — PATIENT HEALTH QUESTIONNAIRE - PHQ9: SUM OF ALL RESPONSES TO PHQ QUESTIONS 1-9: 13

## 2017-07-15 RX ORDER — LEVOTHYROXINE SODIUM 50 UG/1
50 TABLET ORAL DAILY
Qty: 90 TABLET | Refills: 3 | Status: SHIPPED | OUTPATIENT
Start: 2017-07-15 | End: 2017-10-30

## 2017-08-02 ENCOUNTER — TELEPHONE (OUTPATIENT)
Dept: GASTROENTEROLOGY | Facility: CLINIC | Age: 52
End: 2017-08-02

## 2017-08-02 NOTE — TELEPHONE ENCOUNTER
Third attempt to reach patient to schedule Colonoscopy. Not Scheduled at [facility]. Left Messages.

## 2017-08-22 ENCOUNTER — TELEPHONE (OUTPATIENT)
Dept: INTERNAL MEDICINE | Facility: CLINIC | Age: 52
End: 2017-08-22

## 2017-08-22 NOTE — LETTER
[facility]  [address], [address]  [place], [place]  [address]                                            TEL:[phone]  FAX:[phone]      Fallon Pizano  [address]  [facility] [address]          August 22, 2017    Dear Fallon,    In order to ensure we are providing the best quality care, we have reviewed your chart and see that you are due for:     1. Breast cancer screening call to make an appointment with the Mammogram Breast center     at [phone].  2. Colon Cancer Screening. [facility]. Appt. Line [phone].    Please call the clinic at your earliest convenience to schedule an appointment.   Thank you for trusting us with your health care.     Thank you         Sincerely,      Gabriella Carter M.D.

## 2017-08-22 NOTE — TELEPHONE ENCOUNTER
Panel Management Review      Patient has the following on her problem list: None      Composite cancer screening  Chart review shows that this patient is due/due soon for the following Mammogram and Colonoscopy  Summary:    Patient is due/failing the following:   COLONOSCOPY and MAMMOGRAM    Action needed:   No action require- Discussed HM with patient at both last OV's with provider. Order in- will send reminder letter.    Type of outreach:    Sent letter.    Questions for provider review:    None                                                                                                                                     Lucian Messina CMA       Chart routed to CLOSED.

## 2017-10-03 DIAGNOSIS — R07.89 OTHER CHEST PAIN: ICD-10-CM

## 2017-10-03 RX ORDER — AMLODIPINE BESYLATE 5 MG/1
TABLET ORAL
Qty: 90 TABLET | Refills: 0 | Status: SHIPPED | OUTPATIENT
Start: 2017-10-03 | End: 2017-12-20

## 2017-10-03 NOTE — TELEPHONE ENCOUNTER
AMLODIPINE      Last Written Prescription Date: 04/06/17  Last Fill Quantity: 90, # refills: 1  Last Office Visit with [facility] or [facility] prescribing provider: 07/13/17       Potassium   Date Value Ref Range Status   04/06/2017 4.3 3.4 - 5.3 mmol/L Final     Creatinine   Date Value Ref Range Status   04/06/2017 0.86 0.52 - 1.04 mg/dL Final     BP Readings from Last 3 Encounters:   07/13/17 92/66   04/06/17 100/70   09/15/16 130/88

## 2017-10-08 ENCOUNTER — TELEPHONE (OUTPATIENT)
Dept: INTERNAL MEDICINE | Facility: CLINIC | Age: 52
End: 2017-10-08

## 2017-10-12 ASSESSMENT — PATIENT HEALTH QUESTIONNAIRE - PHQ9: SUM OF ALL RESPONSES TO PHQ QUESTIONS 1-9: 9

## 2017-10-12 NOTE — TELEPHONE ENCOUNTER
Spoke to patient: Ambien not as effective in the last week. Pt is worried about family in [place] also loss of appetite and not losing weight.

## 2017-10-12 NOTE — TELEPHONE ENCOUNTER
When someone is quite stressed ambien often does not work. She should be sure she is taking it about 10-15 minutes before lying down. Sometimes sooner or later keeps the brain from getting sleepy. The other issue is that this is intended for short term use; we were trying to help her get back to normal sleep. If she has been having to use the med all the time, it may be time to consider other options. Does she think the appetite issue is related to sleep, stress or medication?

## 2017-10-16 NOTE — TELEPHONE ENCOUNTER
Call back from pt. Updated. States she is unsure what  appetite issues are related to. Transferred to scheduling to schedule apt.

## 2017-10-27 ENCOUNTER — OFFICE VISIT (OUTPATIENT)
Dept: INTERNAL MEDICINE | Facility: CLINIC | Age: 52
End: 2017-10-27
Payer: COMMERCIAL

## 2017-10-27 VITALS
SYSTOLIC BLOOD PRESSURE: 100 MMHG | HEART RATE: 89 BPM | OXYGEN SATURATION: 97 % | HEIGHT: 64 IN | TEMPERATURE: 98.6 F | DIASTOLIC BLOOD PRESSURE: 64 MMHG | BODY MASS INDEX: 26.12 KG/M2 | WEIGHT: 153 LBS

## 2017-10-27 DIAGNOSIS — E03.9 ACQUIRED HYPOTHYROIDISM: ICD-10-CM

## 2017-10-27 DIAGNOSIS — Z12.11 SPECIAL SCREENING FOR MALIGNANT NEOPLASMS, COLON: ICD-10-CM

## 2017-10-27 DIAGNOSIS — Z23 NEED FOR PROPHYLACTIC VACCINATION AND INOCULATION AGAINST INFLUENZA: ICD-10-CM

## 2017-10-27 DIAGNOSIS — G47.00 INSOMNIA, UNSPECIFIED TYPE: Primary | ICD-10-CM

## 2017-10-27 DIAGNOSIS — R63.0 DECREASED APPETITE: ICD-10-CM

## 2017-10-27 PROCEDURE — 84443 ASSAY THYROID STIM HORMONE: CPT | Performed by: INTERNAL MEDICINE

## 2017-10-27 PROCEDURE — 90686 IIV4 VACC NO PRSV 0.5 ML IM: CPT | Performed by: INTERNAL MEDICINE

## 2017-10-27 PROCEDURE — 84439 ASSAY OF FREE THYROXINE: CPT | Performed by: INTERNAL MEDICINE

## 2017-10-27 PROCEDURE — 99214 OFFICE O/P EST MOD 30 MIN: CPT | Mod: 25 | Performed by: INTERNAL MEDICINE

## 2017-10-27 PROCEDURE — G0008 ADMIN INFLUENZA VIRUS VAC: HCPCS | Performed by: INTERNAL MEDICINE

## 2017-10-27 PROCEDURE — 36415 COLL VENOUS BLD VENIPUNCTURE: CPT | Performed by: INTERNAL MEDICINE

## 2017-10-27 RX ORDER — TRAZODONE HYDROCHLORIDE 50 MG/1
TABLET, FILM COATED ORAL
Qty: 60 TABLET | Refills: 1 | Status: SHIPPED | OUTPATIENT
Start: 2017-10-27 | End: 2017-12-23

## 2017-10-27 NOTE — MR AVS SNAPSHOT
After Visit Summary   10/27/2017    Fallon Pizano    MRN: [id number]           Patient Information     Date Of Birth          1965        Visit Information        Provider Department      10/27/2017 11:00 AM Gabriella Carter MD [facility]        Today's Diagnoses     Insomnia, unspecified type    -  1    Special screening for malignant neoplasms, colon        Acquired hypothyroidism          Care Instructions    Start trazodone with 1/2 tab (25 mg) 30-45 minutes before bedtime. If not helping at all and no side effects, can increase to 1 tablet the next night.   If helps some, or some am grogginess, try it for 2-4 more nights, then try to increase.   You can take it earlier if not starting to work in 30 minutes.            Follow-ups after your visit        Additional Services     GASTROENTEROLOGY ADULT REF PROCEDURE ONLY       Last Lab Result: Creatinine (mg/dL)       Date                     Value                 04/06/2017               0.86             ----------  Body mass index is 26.26 kg/(m^2).     Needed:  No  Language:  English    Patient will be contacted to schedule procedure.     Please be aware that coverage of these services is subject to the terms and limitations of your health insurance plan.  Call member services at your health plan with any benefit or coverage questions.  Any procedures must be performed at a [facility] facility OR coordinated by your clinic's referral office.    Please bring the following with you to your appointment:    (1) Any X-Rays, CTs or MRIs which have been performed.  Contact the facility where they were done to arrange for  prior to your scheduled appointment.    (2) List of current medications   (3) This referral request   (4) Any documents/labs given to you for this referral                  Who to contact     If you have questions or need follow up information about today's clinic visit or your schedule  "please contact [facility] directly at [phone].  Normal or non-critical lab and imaging results will be communicated to you by MyChart, letter or phone within 4 business days after the clinic has received the results. If you do not hear from us within 7 days, please contact the clinic through [employer] or phone. If you have a critical or abnormal lab result, we will notify you by phone as soon as possible.  Submit refill requests through [employer] or call your pharmacy and they will forward the refill request to us. Please allow 3 business days for your refill to be completed.          Additional Information About Your Visit        [facility] Information     [employer] gives you secure access to your electronic health record. If you see a primary care provider, you can also send messages to your care team and make appointments. If you have questions, please call your primary care clinic.  If you do not have a primary care provider, please call [phone] and they will assist you.        Care EveryWhere ID     This is your Care EveryWhere ID. This could be used by other organizations to access your [place] medical records  [id number]        Your Vitals Were     Pulse Temperature Height Last Period Pulse Oximetry BMI (Body Mass Index)    89 98.6  F (37  C) (Oral) 5' 4\" (1.626 m) 07/18/2013 97% 26.26 kg/m2       Blood Pressure from Last 3 Encounters:   10/27/17 100/64   07/13/17 92/66   04/06/17 100/70    Weight from Last 3 Encounters:   10/27/17 153 lb (69.4 kg)   07/13/17 150 lb 1.6 oz (68.1 kg)   04/06/17 146 lb (66.2 kg)              We Performed the Following     GASTROENTEROLOGY ADULT REF PROCEDURE ONLY     TSH with free T4 reflex          Today's Medication Changes          These changes are accurate as of: 10/27/17 11:38 AM.  If you have any questions, ask your nurse or doctor.               Start taking these medicines.        Dose/Directions    traZODone 50 MG tablet   Commonly known as:  " DESYREL   Used for:  Insomnia, unspecified type   Started by:  Gabriella Carter MD        Start 1/2 po qhs, increase every 3-5 days as directed to 100 mg   Quantity:  60 tablet   Refills:  1         These medicines have changed or have updated prescriptions.        Dose/Directions    levothyroxine 50 MCG tablet   Commonly known as:  SYNTHROID/LEVOTHROID   This may have changed:  Another medication with the same name was removed. Continue taking this medication, and follow the directions you see here.   Used for:  Acquired hypothyroidism   Changed by:  Gabriella Carter MD        Dose:  50 mcg   Take 1 tablet (50 mcg) by mouth daily   Quantity:  90 tablet   Refills:  3            Where to get your medicines      These medications were sent to [facility]/pharmacy #[id number] - [place], [place] - [address] [address]  [address] [address], [place], operated [facility] [address]    Hours:  [facility] drug converted to [facility] Phone:  [phone]     traZODone 50 MG tablet                Primary Care Provider Office Phone # Fax #    Gabriella Carter [phone]       [address] [facility] [address]  [facility] [address]        Equal Access to Services     [facility]: Hadii aad ku hadasho Soomaali, waaxda luqadaha, qaybta kaalmada adeegyakhadar, waxay kathleen carranza . So [facility] [phone].    ATENCIÓN: Si habla español, tiene a cheung disposición servicios gratuitos de asistencia lingüística. [facility] [phone].    We comply with applicable federal civil rights laws and [place] laws. We do not discriminate on the basis of race, color, national origin, age, disability, sex, sexual orientation, or gender identity.            Thank you!     Thank you for choosing [facility]  for your care. Our goal is always to provide you with excellent care. Hearing back from our patients is one way we can continue to improve our services. Please take a few minutes to complete the written survey that you may receive in the mail after your visit with  us. Thank you!             Your Updated Medication List - Protect others around you: Learn how to safely use, store and throw away your medicines at www.disposemymeds.org.          This list is accurate as of: 10/27/17 11:38 AM.  Always use your most recent med list.                   Brand Name Dispense Instructions for use Diagnosis    amLODIPine 5 MG tablet    NORVASC    90 tablet    TAKE 1 TABLET BY MOUTH ONCE DAILY    Other chest pain       aspirin 81 MG tablet     90 tablet    Take 1 tablet by mouth daily.        atorvastatin 40 MG tablet    LIPITOR    90 tablet    Take 1 tablet (40 mg) by mouth daily    Coronary artery disease involving native coronary artery of native heart without angina pectoris       carvedilol 6.25 MG tablet    COREG    180 tablet    Take 1 tablet (6.25 mg) by mouth 2 times daily (with meals)    Coronary artery disease involving native coronary artery of native heart without angina pectoris       escitalopram 20 MG tablet    LEXAPRO    90 tablet    Take 1 tablet (20 mg) by mouth daily    Mild episode of recurrent major depressive disorder (H)       IBUPROFEN PO      Take 200 mg by mouth        levothyroxine 50 MCG tablet    SYNTHROID/LEVOTHROID    90 tablet    Take 1 tablet (50 mcg) by mouth daily    Acquired hypothyroidism       lisinopril 5 MG tablet    PRINIVIL/ZESTRIL    90 tablet    Take 1 tablet (5 mg) by mouth daily    Coronary artery disease involving native coronary artery of native heart without angina pectoris       nitroGLYcerin 0.4 MG sublingual tablet    NITROSTAT    25 tablet    Place 1 tablet (0.4 mg) under the tongue every 5 minutes as needed for chest pain    CAD (coronary artery disease)       traZODone 50 MG tablet    DESYREL    60 tablet    Start 1/2 po qhs, increase every 3-5 days as directed to 100 mg    Insomnia, unspecified type       zolpidem 5 MG tablet    AMBIEN    30 tablet    Take 1 tablet (5 mg) by mouth nightly as needed for sleep    Insomnia, unspecified  type

## 2017-10-27 NOTE — PATIENT INSTRUCTIONS
Start trazodone with 1/2 tab (25 mg) 30-45 minutes before bedtime. If not helping at all and no side effects, can increase to 1 tablet the next night.   If helps some, or some am grogginess, try it for 2-4 more nights, then try to increase.   You can take it earlier if not starting to work in 30 minutes.

## 2017-10-27 NOTE — NURSING NOTE
"No chief complaint on file.      Initial /64 (BP Location: Right arm, Patient Position: Sitting, Cuff Size: Adult Large)  Pulse 89  Temp 98.6  F (37  C) (Oral)  Ht 5' 4\" (1.626 m)  Wt 153 lb (69.4 kg)  LMP 07/18/2013  SpO2 97%  BMI 26.26 kg/m2 Estimated body mass index is 26.26 kg/(m^2) as calculated from the following:    Height as of this encounter: 5' 4\" (1.626 m).    Weight as of this encounter: 153 lb (69.4 kg).  Medication Reconciliation: complete    "

## 2017-10-27 NOTE — PROGRESS NOTES

## 2017-10-27 NOTE — PROGRESS NOTES
SUBJECTIVE:   Fallon Pizano is a 52 year old female who presents to clinic today for the following health issues:      1. Insomnia: she has continued to have insomnia since her visit in July. She took ambien every night for only a week. It did help get to sleep better. She changed it to prn after a week and has had trouble falling asleep 7/7 nights. She has been using the ambien about 2 times a week. She waits a few hours before taking though. She has tried distractions but not helping.   She is very tired during the day.     2. Decreased appetite: this has been about a month. She has been making herself eat and has not had any nausea, pain with eating. She has occasional mild night sweats since menopause without change. No chills, no fevers. She has no change in smell or taste.       Patient Active Problem List   Diagnosis     Hyperlipidemia LDL goal <70     Mild episode of recurrent major depressive disorder (H)     CAD (coronary artery disease)     Allergic rhinitis     Abnormal blood sugar     Acquired hypothyroidism     Current Outpatient Prescriptions   Medication Sig Dispense Refill     traZODone (DESYREL) 50 MG tablet Start 1/2 po qhs, increase every 3-5 days as directed to 100 mg 60 tablet 1     amLODIPine (NORVASC) 5 MG tablet TAKE 1 TABLET BY MOUTH ONCE DAILY 90 tablet 0     levothyroxine (SYNTHROID/LEVOTHROID) 50 MCG tablet Take 1 tablet (50 mcg) by mouth daily 90 tablet 3     atorvastatin (LIPITOR) 40 MG tablet Take 1 tablet (40 mg) by mouth daily 90 tablet 3     carvedilol (COREG) 6.25 MG tablet Take 1 tablet (6.25 mg) by mouth 2 times daily (with meals) 180 tablet 3     escitalopram (LEXAPRO) 20 MG tablet Take 1 tablet (20 mg) by mouth daily 90 tablet 3     lisinopril (PRINIVIL/ZESTRIL) 5 MG tablet Take 1 tablet (5 mg) by mouth daily 90 tablet 1     aspirin 81 MG tablet Take 1 tablet by mouth daily. 90 tablet 3     IBUPROFEN PO Take 200 mg by mouth       zolpidem (AMBIEN) 5 MG tablet Take  "1 tablet (5 mg) by mouth nightly as needed for sleep 30 tablet 1     [DISCONTINUED] levothyroxine (SYNTHROID/LEVOTHROID) 25 MCG tablet Take 1 tablet (25 mcg) by mouth daily 90 tablet 1     nitroglycerin (NITROSTAT) 0.4 MG SL tablet Place 1 tablet (0.4 mg) under the tongue every 5 minutes as needed for chest pain 25 tablet 0      Social History   Substance Use Topics     Smoking status: Current Some Day Smoker     Types: Cigarettes     Last attempt to quit: 4/2/2011     Smokeless tobacco: Never Used      Comment: smokes occ     Alcohol use 0.0 oz/week     0 Standard drinks or equivalent per week      Comment: Casual        Reviewed and updated as needed this visit by clinical staff  Tobacco  Meds  Med Hx  Surg Hx  Fam Hx  Soc Hx      Reviewed and updated as needed this visit by Provider         ROS:  No fever, chills, nausea, abdominal pain or bloating    OBJECTIVE:     /64 (BP Location: Right arm, Patient Position: Sitting, Cuff Size: Adult Large)  Pulse 89  Temp 98.6  F (37  C) (Oral)  Ht 5' 4\" (1.626 m)  Wt 153 lb (69.4 kg)  LMP 07/18/2013  SpO2 97%  BMI 26.26 kg/m2  Body mass index is 26.26 kg/(m^2).    Not examined.     PHQ-9 SCORE 4/6/2017 7/13/2017 10/12/2017   Total Score - - -   Total Score 11 13 9      ALBARO-7 SCORE 5/21/2015   Total Score 6         ASSESSMENT/PLAN:             1. Insomnia, unspecified type  Continued symptoms, did not take ambien correctly. Since symptoms every night, start trazodone, advised on side effects, how to wean it up.   - traZODone (DESYREL) 50 MG tablet; Start 1/2 po qhs, increase every 3-5 days as directed to 100 mg  Dispense: 60 tablet; Refill: 1    2. Decreased appetite  Unclear etiology but may be related to mood, insomnia. Could be thyroid related, overdue for recheck. No other symptoms to suggest other medical problem    3. Acquired hypothyroidism  recheck  - TSH with free T4 reflex    4. Need for prophylactic vaccination and inoculation against " influenza    - FLU VAC, SPLIT VIRUS IM > 3 YO (QUADRIVALENT) [42548]    5. Special screening for malignant neoplasms, colon    - GASTROENTEROLOGY ADULT REF PROCEDURE ONLY        Gabriella Carter MD  [facility]    30 minutes spent with the patient, >50% of time spent counseling about sleep, medication.

## 2017-10-28 LAB
T4 FREE SERPL-MCNC: 1.27 NG/DL (ref 0.76–1.46)
TSH SERPL DL<=0.005 MIU/L-ACNC: 0.08 MU/L (ref 0.4–4)

## 2017-10-30 ENCOUNTER — TELEPHONE (OUTPATIENT)
Dept: INTERNAL MEDICINE | Facility: CLINIC | Age: 52
End: 2017-10-30

## 2017-10-30 DIAGNOSIS — E03.9 ACQUIRED HYPOTHYROIDISM: Primary | ICD-10-CM

## 2017-10-30 RX ORDER — LEVOTHYROXINE SODIUM 50 UG/1
TABLET ORAL
Qty: 45 TABLET | Refills: 3
Start: 2017-10-30 | End: 2020-02-21

## 2017-10-30 RX ORDER — LEVOTHYROXINE SODIUM 25 UG/1
TABLET ORAL
Qty: 45 TABLET | Refills: 3 | Status: SHIPPED | OUTPATIENT
Start: 2017-10-30 | End: 2018-10-21

## 2017-10-30 NOTE — TELEPHONE ENCOUNTER
Spoke with pt.  Advised her of the message below from PCP.    She does not have any 25mcg tabs left.  Asking to have new rx for 25mcg tabs sent to the pharm.    Lab orders pended (unsure if PCP wanted T4 free rechecked and /or TSH with free T4)--please sign.    Please advise, thanks.

## 2017-10-30 NOTE — LETTER
[place] [address]  [facility] [address]  [phone]        February 6, 2018    Fallon Pizano  [address]  [facility] [address]              Dear Fallon Pizano    This is to remind you that your non-fasting lab is due.    You may call our office at [phone] to schedule an appointment.    Please disregard this notice if you have already had your labs drawn or made an appointment.        Sincerely,        Gabriella Carter MD

## 2017-10-30 NOTE — TELEPHONE ENCOUNTER
Please call patient and advise her thyroid test shows she is overtreated. She should lower her dose. She should take 50 mcg one day and 25 mcg the next day. They do not cut very well so usually will need to use the 25 mcg pill. Some want to use just the 25 mcg and take 1 one day and 2 the other. See if she has any of the 25 mcg left and if so, how many. She should have her level rechecked in 8 weeks.

## 2017-11-01 DIAGNOSIS — I25.10 CORONARY ARTERY DISEASE INVOLVING NATIVE CORONARY ARTERY OF NATIVE HEART WITHOUT ANGINA PECTORIS: ICD-10-CM

## 2017-11-01 RX ORDER — LISINOPRIL 5 MG/1
TABLET ORAL
Qty: 90 TABLET | Refills: 1 | Status: SHIPPED | OUTPATIENT
Start: 2017-11-01 | End: 2018-04-19

## 2017-11-01 NOTE — TELEPHONE ENCOUNTER
LOV: 10/27/17    Lisinopril Prescription approved per [facility] Refill Protocol.

## 2017-11-22 ENCOUNTER — HOSPITAL ENCOUNTER (OUTPATIENT)
Facility: CLINIC | Age: 52
Discharge: HOME | End: 2017-11-22
Attending: INTERNAL MEDICINE | Admitting: INTERNAL MEDICINE
Payer: COMMERCIAL

## 2017-11-22 VITALS
BODY MASS INDEX: 25.61 KG/M2 | WEIGHT: 150 LBS | OXYGEN SATURATION: 97 % | HEIGHT: 64 IN | RESPIRATION RATE: 15 BRPM | DIASTOLIC BLOOD PRESSURE: 57 MMHG | SYSTOLIC BLOOD PRESSURE: 96 MMHG

## 2017-11-22 LAB — COLONOSCOPY: NORMAL

## 2017-11-22 PROCEDURE — G0121 COLON CA SCRN NOT HI RSK IND: HCPCS | Performed by: INTERNAL MEDICINE

## 2017-11-22 PROCEDURE — 25000128 H RX IP 250 OP 636: Performed by: INTERNAL MEDICINE

## 2017-11-22 PROCEDURE — 45378 DIAGNOSTIC COLONOSCOPY: CPT | Performed by: INTERNAL MEDICINE

## 2017-11-22 PROCEDURE — G0500 MOD SEDAT ENDO SERVICE >5YRS: HCPCS | Performed by: INTERNAL MEDICINE

## 2017-11-22 RX ORDER — ONDANSETRON 2 MG/ML
4 INJECTION INTRAMUSCULAR; INTRAVENOUS EVERY 6 HOURS PRN
Status: DISCONTINUED | OUTPATIENT
Start: 2017-11-22 | End: 2017-11-22 | Stop reason: HOSPADM

## 2017-11-22 RX ORDER — ONDANSETRON 2 MG/ML
4 INJECTION INTRAMUSCULAR; INTRAVENOUS
Status: DISCONTINUED | OUTPATIENT
Start: 2017-11-22 | End: 2017-11-22 | Stop reason: HOSPADM

## 2017-11-22 RX ORDER — NALOXONE HYDROCHLORIDE 0.4 MG/ML
.1-.4 INJECTION, SOLUTION INTRAMUSCULAR; INTRAVENOUS; SUBCUTANEOUS
Status: DISCONTINUED | OUTPATIENT
Start: 2017-11-22 | End: 2017-11-22 | Stop reason: HOSPADM

## 2017-11-22 RX ORDER — FENTANYL CITRATE 50 UG/ML
INJECTION, SOLUTION INTRAMUSCULAR; INTRAVENOUS PRN
Status: DISCONTINUED | OUTPATIENT
Start: 2017-11-22 | End: 2017-11-22 | Stop reason: HOSPADM

## 2017-11-22 RX ORDER — FLUMAZENIL 0.1 MG/ML
0.2 INJECTION, SOLUTION INTRAVENOUS
Status: DISCONTINUED | OUTPATIENT
Start: 2017-11-22 | End: 2017-11-22 | Stop reason: HOSPADM

## 2017-11-22 RX ORDER — LIDOCAINE 40 MG/G
CREAM TOPICAL
Status: DISCONTINUED | OUTPATIENT
Start: 2017-11-22 | End: 2017-11-22 | Stop reason: HOSPADM

## 2017-11-22 RX ORDER — ONDANSETRON 4 MG/1
4 TABLET, ORALLY DISINTEGRATING ORAL EVERY 6 HOURS PRN
Status: DISCONTINUED | OUTPATIENT
Start: 2017-11-22 | End: 2017-11-22 | Stop reason: HOSPADM

## 2017-11-22 NOTE — LETTER
November 1, 2017      Fallon Pizano  [address]  [facility] [address]        Dear Fallon,     Thank you for choosing [facility]. You are scheduled for the following service.   Date:  Wednesday, November 22, 2017             Procedure:  COLONOSCOPY  Doctor:        Dr. Botello  Arrival Time:  0830 am   *check in at Emergency/Endoscopy desk*  Procedure Time: 0900 am    Location:   [facility]        Endoscopy Department, First Floor (Enter through ER Doors) *        [address] [place], [place] [address]      [phone] or [phone] () to reschedule      Colonoscopy is the most accurate test to detect colon polyps and colon cancer; and the only test where polyps can be removed. During this procedure, a doctor examines the lining of your large intestine and rectum through a flexible tube.     Transportation  Arrange for a ride for the day of your procedure with a responsible adult.  A taxi ride is not an option unless you are accompanied by a responsible adult. If you fail to arrange transportation with a responsible adult, your procedure will be cancelled and rescheduled.    MIRALAX -GATORADE  PREP  (without magnesium citrate)    Purchase the  following supplies at your local pharmacy:  - 2 (two) bisacodyl tablets: each tablet contains 5 mg.  (Dulcolax  laxative NOT Dulcolax  stool softener)   - 1 (one) 8.3 oz bottle of Polyethylene Glycol (PEG) 3350 Powder   (MiraLAX , Smooth LAX , ClearLAX  or equivalent)  - 64 oz Gatorade    Regular Gatorade, Gatorade G2 , Powerade , Powerade Zero  or Pedialyte  is acceptable. Red colored flavors are not allowed; all other colors (yellow, green, orange, purple and blue) are okay. It is also okay to buy two 2.12 oz packets of powdered Gatorade that can be mixed with water to a total volume of 64 oz of liquid.  PREPARATION FOR COLONOSCOPY  7 days before:    Discontinue fiber supplements and  medications containing iron. This includes Metamucil  and Fibercon ; and multivitamins with iron.  3 days before:    Begin a low-fiber diet. A low-fiber diet helps making the cleanout more effective.     Examples of a low-fiber diet include (but are not limited to): white bread, white rice, pasta, crackers, fish, chicken, eggs, ground beef, creamy peanut butter, cooked/steamed/boiled vegetables, canned fruit, bananas, melons, milk, plain yogurt cheese, salad dressing and other condiments.     The following are not allowed on a low-fiber diet: seeds, nuts, popcorn, bran, whole wheat, corn, quinoa, raw fruits and vegetables, berries and dried fruit, beans and lentils.    For additional details on low-fiber diet, please refer to the table on the last page.  2 days before:    Continue the low-fiber diet.     Drink at least 8 glasses of water throughout the day.     Stop eating solid foods at 11:45 pm.  1 day before:    In the morning: begin a clear liquid diet (liquids you can see through).     Examples of a clear liquid diet include: water, clear broth or bouillon, Gatorade, Pedialyte or Powerade, carbonated and non-carbonated soft drinks (Sprite , 7-Up , ginger ale), strained fruit juices without pulp (apple, white grape, white cranberry), Jell-O  and popsicles.     The following are not allowed on a clear liquid diet: red liquids, alcoholic beverages, coffee, dairy products (milk, creamer, and yogurt), protein shakes, creamy broths, juice with pulp and chewing tobacco.    At noon: take 2 (two) bisacodyl tablets     At 4 (and no later than 6pm): start drinking the Miralax-Gatorade preparation (8.3 oz of Miralax mixed with 64 oz of Gatorade in a large pitcher). Drink 1(one) 8 oz glass every 15 minutes thereafter, until the mixture is gone.    COLON CLEANSING TIPS: drink adequate amounts of fluids before and after your colon cleansing to prevent dehydration. Stay near a toilet because you will have diarrhea. Even if you  are sitting on the toilet, continue to drink the cleansing solution every 15 minutes. If you feel nauseous or vomit, rinse your mouth with water, take a 15 to 30-minute-break and then continue drinking the solution. You will be uncomfortable until the stool has flushed from your colon (in about 2 to 4 hours). You may feel chilled.    Day of your procedure  You may take all of your morning medications including blood pressure medications, blood thinners (if you have not been instructed to stop these by our office), methadone, anti-seizure medications with sips of water 3 hours prior to your procedure or earlier. Do not take insulin or vitamins prior to your procedure. Continue the clear liquid diet.   4 hours prior:     STOP consuming all liquids     Do not take anything by mouth during this time.     Allow extra time to travel to your procedure as you may need to stop and use a restroom along the way.  You are ready for the procedure, if you followed all instructions and your stool is no longer formed, but clear or yellow liquid. If you are unsure whether your colon is clean, please call our office at [phone] before you leave for your appointment.  Bring the following to your procedure:  - Insurance Card/Photo ID.   - List of current medications including over-the-counter medications and supplements.   - Your rescue inhaler if you currently use one to control asthma.    Canceling or rescheduling your appointment:   If you must cancel or reschedule your appointment, please call [phone] as soon as possible.      COLONOSCOPY PRE-PROCEDURE CHECKLIST  If you have diabetes, ask your regular doctor for diet and medication restrictions.  If you take an anticoagulant or anti-platelet medication (such as Coumadin , Lovenox , Pradaxa , Xarelto , Eliquis , etc.), please call your primary doctor for advice on holding this medication.  If you take aspirin you may continue to do so.  If you are or may be pregnant, please  discuss the risks and benefits of this procedure with your doctor.    What happens during a colonoscopy?  Plan to spend up to two hours, starting at registration time, at the endoscopy center the day of your procedure. The colonoscopy takes an average of 15 to 30 minutes. Recovery time is about 30 minutes.   Before the exam:    You will change into a gown.    Your medical history and medication list will be reviewed with you, unless that has been done over the phone prior to the procedure.     A nurse will insert an intravenous (IV) line into your hand or arm.    The doctor will meet with you and will give you a consent form to sign.  During the exam:     Medicine will be given through the IV line to help you relax.     Your heart rate and oxygen levels will be monitored. If your blood pressure is low, you may be given fluids through the IV line.     The doctor will insert a flexible hollow tube, called a colonoscope, into your rectum. The scope will be advanced slowly through the large intestine (colon).    You may have a feeling of fullness or pressure.     If an abnormal tissue or a polyp is found, the doctor may remove it through the endoscope for closer examination, or biopsy. Tissue removal is painless  After the exam:           Any tissue samples removed during the exam will be sent to a lab for evaluation. It may take 5-7 working days for you to be notified of the results.     A nurse will provide you with complete discharge instructions before you leave the endoscopy center. Be sure to ask the nurse for specific instructions if you take blood thinners such as Aspirin, Coumadin or Plavix.     The doctor will prepare a full report for you and for the physician who referred you for the procedure.     Your doctor will talk with you about the initial results of your exam.      Medication given during the exam will prohibit you from driving for the rest of the day.     Following the exam, you may resume your  normal diet. Your first meal should be light, no greasy foods. Avoid alcohol until the next day.     You may resume your regular activities the day after the procedure.       LOW-FIBER DIET  Foods RECOMMENDED Foods to AVOID   Breads, Cereal, Rice and Pasta:   White bread, rolls, biscuits, croissant and margaret toast.   Waffles, Slovak toast and pancakes.   White rice, noodles, pasta, macaroni and peeled cooked potatoes.   Plain crackers and saltines.   Cooked cereals: farina, cream of rice.   Cold cereals: Puffed Rice , Rice Krispies , Corn Flakes  and Special K    Breads, Cereal, Rice and Pasta:   Breads or rolls with nuts, seeds or fruit.   Whole wheat, pumpernickel, rye breads and cornbread.   Potatoes with skin, brown or wild rice, and kasha (buckwheat).     Vegetables:   Tender cooked and canned vegetables without seeds: carrots, asparagus tips, green or wax beans, pumpkin, spinach, lima beans. Vegetables:   Raw or steamed vegetables.   Vegetables with seeds.   Sauerkraut.   Winter squash, peas, broccoli, Brussel sprouts, cabbage, onions, cauliflower, baked beans, peas and corn.   Fruits:   Strained fruit juice.   Canned fruit, except pineapple.   Ripe bananas and melon. Fruits:   Prunes and prune juice.   Raw fruits.   Dried fruits: figs, dates and raisins.   Milk/Dairy:   Milk: plain or flavored.   Yogurt, custard and ice cream.   Cheese and cottage cheese Milk/Dairy:     Meat and other proteins:   ground, well-cooked tender beef, lamb, ham, veal, pork, fish, poultry and organ meats.   Eggs.   Peanut butter without nuts. Meat and other proteins:   Tough, fibrous meats with gristle.   Dry beans, peas and lentils.   Peanut butter with nuts.   Tofu.   Fats, Snack, Sweets, Condiments and Beverages:   Margarine, butter, oils, mayonnaise, sour cream and salad dressing, plain gravy.   Sugar, hard candy, clear jelly, honey and syrup.   Spices, cooked herbs, bouillon, broth and soups made with allowed vegetable, ketchup  and mustard.   Coffee, tea and carbonated drinks.   Plain cakes, cookies and pretzels.   Gelatin, plain puddings, custard, ice cream, sherbet and popsicles. Fats, Snack, Sweets, Condiments and Beverages:   Nuts, seeds and coconut.   Jam, marmalade and preserves.   Pickles, olives, relish and horseradish.   All desserts containing nuts, seeds, dried fruit and coconut; or made from whole grains or bran.   Candy made with nuts or seeds.   Popcorn.       DIRECTIONS TO THE ENDOSCOPY DEPARTMENT    From the north ([facility])  Take [address] South, exit on [address] . Get into the left hand hemanth, turn left (east), go one-half mile to [address] and turn left. Go north to the first stoplight, take a right on [address] and follow it to the Emergency entrance.    From the south ([facility])  Take 35N to the 35E split and exit on [address] . On [address] , turn left (west) to [address]. Turn right (north) on [address]. Go north to the first stoplight, take a right on [address] and follow it to the Emergency entrance.    From the east via 35E ([facility])  Take [address]E south to [place] [address] exit. Turn right on [address]. Go west to [address]. Turn right (north) on [address]. Go to the first stoplight, take a right and follow on [address] to the Emergency entrance.    From the east via [address] ([facility])  Take [address] to [address]. Turn left (south) on [address] to [address]. Turn left (east) on [address] and follow it to the Emergency entrance.    From the west via [address] ([place], [place])  Take [address] east to [address]. Turn right (south) on [address] to [address]. Turn left (east) on [address] and follow it to the Emergency entrance.            Sincerely,        [facility].

## 2017-11-22 NOTE — H&P
Pre-Endoscopy History and Physical     Fallon Pizano MRN# [id number]   YOB: 1965 Age: 52 year old     Date of Procedure: 11/22/2017  Primary care provider: Gabriella Carter  Type of Endoscopy: Colonoscopy with possible biopsy, possible polypectomy  Reason for Procedure: screen  Type of Anesthesia Anticipated: Conscious Sedation    HPI:    Fallon is a 52 year old female who will be undergoing the above procedure.      A history and physical has been performed. The patient's medications and allergies have been reviewed. The risks and benefits of the procedure and the sedation options and risks were discussed with the patient.  All questions were answered and informed consent was obtained.      She denies a personal or family history of anesthesia complications or bleeding disorders.     Patient Active Problem List   Diagnosis     Hyperlipidemia LDL goal <70     Mild episode of recurrent major depressive disorder (H)     CAD (coronary artery disease)     Allergic rhinitis     Abnormal blood sugar     Acquired hypothyroidism        Past Medical History:   Diagnosis Date     Allergic rhinitis      CAD (coronary artery disease) 4/11    AMI, LAD stent     Hyperlipidemia      Major depression     related to CAD     Palpitations         Past Surgical History:   Procedure Laterality Date     ANGIOGRAM  4/11    proximal LAD stent     HC ENLARGE BREAST WITH IMPLANT         Social History   Substance Use Topics     Smoking status: Current Some Day Smoker     Types: Cigarettes     Last attempt to quit: 4/2/2011     Smokeless tobacco: Never Used      Comment: smokes occ     Alcohol use 0.0 oz/week     0 Standard drinks or equivalent per week      Comment: Casual       Family History   Problem Relation Age of Onset     DIABETES Mother      C.A.D. Mother      HEART DISEASE Mother      valve disease, pacer     Connective Tissue Disorder Mother      autoimmune disease     GASTROINTESTINAL DISEASE Mother       "cirrhosis, non alcoholic     C.A.D. Father 55       Prior to Admission medications    Medication Sig Start Date End Date Taking? Authorizing Provider   lisinopril (PRINIVIL/ZESTRIL) 5 MG tablet TAKE 1 TABLET BY MOUTH ONCE DAILY 11/1/17  Yes Gabriella Carter MD   levothyroxine (SYNTHROID/LEVOTHROID) 25 MCG tablet Take 1 every other day alternating with 50 mcg. 10/30/17  Yes Gabriella Cartre MD   levothyroxine (SYNTHROID/LEVOTHROID) 50 MCG tablet 1 po every other day alternating with 25 mcg 10/30/17  Yes Gabriella Carter MD   traZODone (DESYREL) 50 MG tablet Start 1/2 po qhs, increase every 3-5 days as directed to 100 mg 10/27/17  Yes Gabriella Carter MD   amLODIPine (NORVASC) 5 MG tablet TAKE 1 TABLET BY MOUTH ONCE DAILY 10/3/17  Yes Gabriella Carter MD   IBUPROFEN PO Take 200 mg by mouth   Yes Reported, Patient   atorvastatin (LIPITOR) 40 MG tablet Take 1 tablet (40 mg) by mouth daily 7/13/17  Yes Gabriella Carter MD   carvedilol (COREG) 6.25 MG tablet Take 1 tablet (6.25 mg) by mouth 2 times daily (with meals) 7/13/17  Yes Gabriella Carter MD   zolpidem (AMBIEN) 5 MG tablet Take 1 tablet (5 mg) by mouth nightly as needed for sleep 7/13/17  Yes Gabriella Carter MD   escitalopram (LEXAPRO) 20 MG tablet Take 1 tablet (20 mg) by mouth daily 7/13/17  Yes Gabriella Carter MD   aspirin 81 MG tablet Take 1 tablet by mouth daily. 9/10/12  Yes Gabriella Carter MD   nitroglycerin (NITROSTAT) 0.4 MG SL tablet Place 1 tablet (0.4 mg) under the tongue every 5 minutes as needed for chest pain 10/28/14   Gabriella Carter MD       Allergies   Allergen Reactions     Flagyl [Metronidazole Hcl]      Doesn't remember the reaction     Levaquin Rash        REVIEW OF SYSTEMS:   5 point ROS negative except as noted above in HPI, including Gen., Resp., CV, GI &  system review.    PHYSICAL EXAM:   Ht 1.626 m (5' 4\")  Wt 68 kg (150 lb)  LMP 07/18/2013  BMI 25.75 kg/m2 Estimated body mass index is 25.75 kg/(m^2) as calculated from the following:    Height as of this " "encounter: 1.626 m (5' 4\").    Weight as of this encounter: 68 kg (150 lb).   GENERAL APPEARANCE: alert, and oriented  MENTAL STATUS: alert  AIRWAY EXAM: Mallampatti Class I (visualization of the soft palate, fauces, uvula, anterior and posterior pillars)  RESP: lungs clear to auscultation - no rales, rhonchi or wheezes  CV: regular rates and rhythm  DIAGNOSTICS:    Not indicated    IMPRESSION   ASA Class 2 - Mild systemic disease    PLAN:   Plan for Colonoscopy with possible biopsy, possible polypectomy. We discussed the risks, benefits and alternatives and the patient wished to proceed.    The above has been forwarded to the consulting provider.      Signed Electronically by: Jaylon Botello  November 22, 2017          "

## 2017-12-16 ENCOUNTER — TELEPHONE (OUTPATIENT)
Dept: INTERNAL MEDICINE | Facility: CLINIC | Age: 52
End: 2017-12-16

## 2017-12-16 NOTE — TELEPHONE ENCOUNTER
"12/16/2017        Patient Communication Preferences indicate  Do not contact  and/or communication by \"Phone\" is not preferred. Call not required per Outreach team.          Outreach ,  Syd Washington     "

## 2017-12-20 DIAGNOSIS — R07.89 OTHER CHEST PAIN: ICD-10-CM

## 2017-12-21 RX ORDER — AMLODIPINE BESYLATE 5 MG/1
TABLET ORAL
Qty: 90 TABLET | Refills: 0 | Status: SHIPPED | OUTPATIENT
Start: 2017-12-21 | End: 2018-03-25

## 2017-12-21 NOTE — TELEPHONE ENCOUNTER
BP Readings from Last 3 Encounters:   11/22/17 96/57   10/27/17 100/64   07/13/17 92/66       Creatinine   Date Value Ref Range Status   04/06/2017 0.86 0.52 - 1.04 mg/dL Final       Routing refill request to provider for review/approval because:  Recent low bp's   And dx for med not on SO protocol    Please advise, thanks.

## 2017-12-23 DIAGNOSIS — G47.00 INSOMNIA, UNSPECIFIED TYPE: ICD-10-CM

## 2017-12-27 RX ORDER — TRAZODONE HYDROCHLORIDE 50 MG/1
100 TABLET, FILM COATED ORAL AT BEDTIME
Qty: 180 TABLET | Refills: 1 | Status: ON HOLD | OUTPATIENT
Start: 2017-12-27 | End: 2020-03-24

## 2018-03-25 DIAGNOSIS — R07.89 OTHER CHEST PAIN: ICD-10-CM

## 2018-03-29 RX ORDER — AMLODIPINE BESYLATE 5 MG/1
TABLET ORAL
Qty: 90 TABLET | Refills: 0 | Status: SHIPPED | OUTPATIENT
Start: 2018-03-29 | End: 2018-07-01

## 2018-03-29 NOTE — TELEPHONE ENCOUNTER
"Requested Prescriptions   Pending Prescriptions Disp Refills     amLODIPine (NORVASC) 5 MG tablet [Pharmacy Med Name: AMLODIPINE BESYLATE 5 MG TAB] 90 tablet 0     Sig: TAKE 1 TABLET BY MOUTH ONCE DAILY    Calcium Channel Blockers Protocol  Passed    3/25/2018  1:46 AM       Passed - Blood pressure under 140/90 in past 12 months    BP Readings from Last 3 Encounters:   11/22/17 96/57   10/27/17 100/64   07/13/17 92/66                Passed - Recent (12 mo) or future (30 days) visit within the authorizing provider's specialty    Patient had office visit in the last 12 months or has a visit in the next 30 days with authorizing provider or within the authorizing provider's specialty.  See \"Patient Info\" tab in inbasket, or \"Choose Columns\" in Meds & Orders section of the refill encounter.           Passed - Patient is age 18 or older       Passed - No active pregnancy on record       Passed - Normal serum creatinine on file in past 12 months    Recent Labs   Lab Test  04/06/17   0926   CR  0.86            Passed - No positive pregnancy test in past 12 months          Medication is being filled for 1 time refill only due to:  Patient needs labs --creat in April 2018. Future labs ordered in chart.     [employer] message sent.    "

## 2018-04-09 ENCOUNTER — TELEPHONE (OUTPATIENT)
Dept: INTERNAL MEDICINE | Facility: CLINIC | Age: 53
End: 2018-04-09

## 2018-04-09 ENCOUNTER — RADIANT APPOINTMENT (OUTPATIENT)
Dept: GENERAL RADIOLOGY | Facility: CLINIC | Age: 53
End: 2018-04-09
Attending: FAMILY MEDICINE
Payer: COMMERCIAL

## 2018-04-09 ENCOUNTER — OFFICE VISIT (OUTPATIENT)
Dept: FAMILY MEDICINE | Facility: CLINIC | Age: 53
End: 2018-04-09
Payer: COMMERCIAL

## 2018-04-09 VITALS
TEMPERATURE: 98.2 F | HEART RATE: 72 BPM | OXYGEN SATURATION: 97 % | SYSTOLIC BLOOD PRESSURE: 108 MMHG | HEIGHT: 64 IN | WEIGHT: 150 LBS | BODY MASS INDEX: 25.61 KG/M2 | DIASTOLIC BLOOD PRESSURE: 71 MMHG | RESPIRATION RATE: 16 BRPM

## 2018-04-09 DIAGNOSIS — R10.13 ABDOMINAL PAIN, EPIGASTRIC: ICD-10-CM

## 2018-04-09 DIAGNOSIS — J00 CORYZA: Primary | ICD-10-CM

## 2018-04-09 DIAGNOSIS — E03.9 ACQUIRED HYPOTHYROIDISM: ICD-10-CM

## 2018-04-09 DIAGNOSIS — R05.9 COUGH: ICD-10-CM

## 2018-04-09 DIAGNOSIS — R51.9 NONINTRACTABLE HEADACHE, UNSPECIFIED CHRONICITY PATTERN, UNSPECIFIED HEADACHE TYPE: ICD-10-CM

## 2018-04-09 LAB
BASOPHILS # BLD AUTO: 0 10E9/L (ref 0–0.2)
BASOPHILS NFR BLD AUTO: 0.4 %
DIFFERENTIAL METHOD BLD: NORMAL
EOSINOPHIL # BLD AUTO: 0.1 10E9/L (ref 0–0.7)
EOSINOPHIL NFR BLD AUTO: 1.7 %
ERYTHROCYTE [DISTWIDTH] IN BLOOD BY AUTOMATED COUNT: 14 % (ref 10–15)
HCT VFR BLD AUTO: 42.2 % (ref 35–47)
HGB BLD-MCNC: 13.9 G/DL (ref 11.7–15.7)
LYMPHOCYTES # BLD AUTO: 1.8 10E9/L (ref 0.8–5.3)
LYMPHOCYTES NFR BLD AUTO: 34.7 %
MCH RBC QN AUTO: 31 PG (ref 26.5–33)
MCHC RBC AUTO-ENTMCNC: 32.9 G/DL (ref 31.5–36.5)
MCV RBC AUTO: 94 FL (ref 78–100)
MONOCYTES # BLD AUTO: 0.6 10E9/L (ref 0–1.3)
MONOCYTES NFR BLD AUTO: 10.8 %
NEUTROPHILS # BLD AUTO: 2.8 10E9/L (ref 1.6–8.3)
NEUTROPHILS NFR BLD AUTO: 52.4 %
PLATELET # BLD AUTO: 230 10E9/L (ref 150–450)
RBC # BLD AUTO: 4.49 10E12/L (ref 3.8–5.2)
WBC # BLD AUTO: 5.3 10E9/L (ref 4–11)

## 2018-04-09 PROCEDURE — 99214 OFFICE O/P EST MOD 30 MIN: CPT | Performed by: FAMILY MEDICINE

## 2018-04-09 PROCEDURE — 84443 ASSAY THYROID STIM HORMONE: CPT | Performed by: FAMILY MEDICINE

## 2018-04-09 PROCEDURE — 83690 ASSAY OF LIPASE: CPT | Performed by: FAMILY MEDICINE

## 2018-04-09 PROCEDURE — 80053 COMPREHEN METABOLIC PANEL: CPT | Performed by: FAMILY MEDICINE

## 2018-04-09 PROCEDURE — 36415 COLL VENOUS BLD VENIPUNCTURE: CPT | Performed by: FAMILY MEDICINE

## 2018-04-09 PROCEDURE — 85025 COMPLETE CBC W/AUTO DIFF WBC: CPT | Performed by: FAMILY MEDICINE

## 2018-04-09 PROCEDURE — 71046 X-RAY EXAM CHEST 2 VIEWS: CPT

## 2018-04-09 RX ORDER — FLUTICASONE PROPIONATE 50 MCG
1-2 SPRAY, SUSPENSION (ML) NASAL DAILY
Qty: 1 BOTTLE | Refills: 1 | Status: SHIPPED | OUTPATIENT
Start: 2018-04-09 | End: 2018-12-07

## 2018-04-09 NOTE — PROGRESS NOTES
SUBJECTIVE:   Fallon Pizano is a 52 year old female who presents to clinic today for the following health issues:  Coryza  (primary encounter diagnosis)  Cough  Acute Illness   Acute illness concerns: Sinuses and headache   Onset: 3-4 days     Fever: no     Chills/Sweats: YES    Headache (location?): YES    Sinus Pressure:YES    Conjunctivitis:  no    Ear Pain: YES: right    Rhinorrhea: no     Congestion: YES    Sore Throat: YES     Cough: no    Wheeze: no     Decreased Appetite: YES    Nausea: YES    Vomiting: no     Diarrhea:  no     Dysuria/Freq.: no     Fatigue/Achiness: YES    Sick/Strep Exposure: no      Therapies Tried and outcome: Benadryl, fexofenadine     Abdominal pain, epigastric: Patient notes epigastric abdominal pain 2-4 days.  No pain at rest but she says it hurts to touch. Nothing alleviates or aggravates the condition.     Nonintractable headache, unspecified chronicity pattern, unspecified headache type: Patient complains of headaches that she attributes to sinuses.  Retro-orbital, left sided.    Acquired hypothyroidism  TSH   Date Value Ref Range Status   10/27/2017 0.08 (L) 0.40 - 4.00 mU/L Final   ] Lab is due    Problem list and histories reviewed & adjusted, as indicated.  Additional history: none    Reviewed and updated as needed this visit by clinical staff  Tobacco  Allergies  Meds  Med Hx  Surg Hx  Fam Hx  Soc Hx       Past Medical History:   Diagnosis Date     Allergic rhinitis      CAD (coronary artery disease) 4/11    AMI, LAD stent     Hyperlipidemia      Major depression     related to CAD     Palpitations        Past Surgical History:   Procedure Laterality Date     ANGIOGRAM  4/11    proximal LAD stent     COLONOSCOPY N/A 11/22/2017    Procedure: COLONOSCOPY;  Colonoscopy ;  Surgeon: Jaylon Botello MD;  Location:  GI     HC ENLARGE BREAST WITH IMPLANT         Family History   Problem Relation Age of Onset     DIABETES Mother      C.A.D. Mother      HEART  "DISEASE Mother      valve disease, pacer     Connective Tissue Disorder Mother      autoimmune disease     GASTROINTESTINAL DISEASE Mother      cirrhosis, non alcoholic     C.A.D. Father 55       Social History   Substance Use Topics     Smoking status: Light Tobacco Smoker     Types: Cigarettes     Last attempt to quit: 4/2/2011     Smokeless tobacco: Never Used      Comment: smokes occ     Alcohol use 0.0 oz/week     0 Standard drinks or equivalent per week      Comment: Casual       Reviewed and updated as needed this visit by Provider         ROS:  POSITIVE for photophobia, softer stools  NEGATIVE for fevers, diaphoresis, heartburn, dysphagia, or aura.     This document serves as a record of the services and decisions personally performed and made by Champ Sam MD. It was created on his behalf by Pat Nunez, a trained medical scribe.  The creation of this document is based on the scribe's personal observations and the provider's statements to the medical scribe.  Pat Nunez, April 9, 2018 3:17 PM    OBJECTIVE:     /71 (BP Location: Right arm, Patient Position: Chair, Cuff Size: Adult Regular)  Pulse 72  Temp 98.2  F (36.8  C) (Oral)  Resp 16  Ht 1.626 m (5' 4\")  Wt 68 kg (150 lb)  LMP 07/18/2013  SpO2 97%  Breastfeeding? No  BMI 25.75 kg/m2  Body mass index is 25.75 kg/(m^2).    Exam  ENT: Blue boggy nasal mucosa.  Posterior pharyngeal streaks  NECK: No adenopathy   CHEST: Clear to auscultation, respirations unlabored  ABDOMEN without organomegaly nor tenderness to palpation    X ray: No infiltrate    ASSESSMENT/PLAN:   ASSESSMENT / PLAN:  (J34.89) Coryza  (primary encounter diagnosis)  Comment: History suggest viral syndrome.  Exam and past history suggests allergic rhinitis  Plan: fluticasone (FLONASE) 50 MCG/ACT spray            (R05) Cough  Comment: Postnasal drip.  Symptomatic measures  Plan: XR Chest 2 Views            (R10.13) Abdominal pain, epigastric  Comment: Nonsurgical.  " Patient is quite concerned about this discomfort.  Broaden database  Plan: Comprehensive metabolic panel, CBC with         platelets and differential, Lipase            (R51) Nonintractable headache, unspecified chronicity pattern, unspecified headache type  Comment: History suggest migraine.  Patient denies a history of migraines.  NSAIDs    (E03.9) Acquired hypothyroidism over repleted at last measure  Comment: Lab has been futured draw today    The information in this document, created by the medical scribe for me, accurately reflects the services I personally performed and the decisions made by me. I have reviewed and approved this document for accuracy prior to leaving the patient care area.  Champ Sam MD April 9, 2018 3:17 PM    Champ Sam MD  [facility]

## 2018-04-09 NOTE — TELEPHONE ENCOUNTER
Pt calling.  C/o sinus issue--sore throat, HA.  And c/o abd pain.  States pain is above her umbilicus--L side > R side.  Denies nausea, vomiting, or diarrhea.  No appts available today.  Transferred to schedule appt today at a [facility] clinic.

## 2018-04-09 NOTE — MR AVS SNAPSHOT
After Visit Summary   4/9/2018    Fallon Pizano    MRN: [id number]           Patient Information     Date Of Birth          1965        Visit Information        Provider Department      4/9/2018 2:30 PM Champ Sam MD [facility]        Today's Diagnoses     Coryza    -  1    Cough        Abdominal pain, epigastric        Nonintractable headache, unspecified chronicity pattern, unspecified headache type        Acquired hypothyroidism           Follow-ups after your visit        Who to contact     If you have questions or need follow up information about today's clinic visit or your schedule please contact [facility] directly at [phone].  Normal or non-critical lab and imaging results will be communicated to you by MyChart, letter or phone within 4 business days after the clinic has received the results. If you do not hear from us within 7 days, please contact the clinic through MyChart or phone. If you have a critical or abnormal lab result, we will notify you by phone as soon as possible.  Submit refill requests through avox or call your pharmacy and they will forward the refill request to us. Please allow 3 business days for your refill to be completed.          Additional Information About Your Visit        MyChart Information     avox gives you secure access to your electronic health record. If you see a primary care provider, you can also send messages to your care team and make appointments. If you have questions, please call your primary care clinic.  If you do not have a primary care provider, please call [phone] and they will assist you.        Care EveryWhere ID     This is your Care EveryWhere ID. This could be used by other organizations to access your [facility] medical records  [id number]        Your Vitals Were     Pulse Temperature Respirations Height Last Period Pulse Oximetry    72 98.2  F (36.8  C) (Oral) 16 5'  "4\" (1.626 m) 07/18/2013 97%    Breastfeeding? BMI (Body Mass Index)                No 25.75 kg/m2           Blood Pressure from Last 3 Encounters:   04/09/18 108/71   11/22/17 96/57   10/27/17 100/64    Weight from Last 3 Encounters:   04/09/18 150 lb (68 kg)   11/22/17 150 lb (68 kg)   10/27/17 153 lb (69.4 kg)              We Performed the Following     CBC with platelets and differential     Comprehensive metabolic panel     Lipase     TSH with free T4 reflex          Today's Medication Changes          These changes are accurate as of 4/9/18 11:59 PM.  If you have any questions, ask your nurse or doctor.               Start taking these medicines.        Dose/Directions    fluticasone 50 MCG/ACT spray   Commonly known as:  FLONASE   Used for:  Coryza   Started by:  Champ Sam MD        Dose:  1-2 spray   Spray 1-2 sprays into both nostrils daily   Quantity:  1 Bottle   Refills:  1            Where to get your medicines      These medications were sent to [facility]/pharmacy #[id number] - [place], [place] - [address] [facility] [place] [facility]  [address] [facility] [address]    Hours:  [facility] drug converted to [facility] Phone:  [phone]     fluticasone 50 MCG/ACT spray                Primary Care Provider Office Phone # Fax #    Gabriella Carter [phone]       [address] [facility] [address]  [facility] [address]        Equal Access to Services     [facility] AH: Hadii aad ku hadasho Soomaali, waaxda luqadaha, qaybta kaalmada adeegyada, waxtalita kathleen hunter. So [facility] [phone].    ATENCIÓN: Si habla español, tiene a cheung disposición servicios gratuitos de asistencia lingüística. Llame al [phone].    We comply with applicable federal civil rights laws and [place] laws. We do not discriminate on the basis of race, color, national origin, age, disability, sex, sexual orientation, or gender identity.            Thank you!     Thank you for choosing [facility]  for your care. Our goal " is always to provide you with excellent care. Hearing back from our patients is one way we can continue to improve our services. Please take a few minutes to complete the written survey that you may receive in the mail after your visit with us. Thank you!             Your Updated Medication List - Protect others around you: Learn how to safely use, store and throw away your medicines at www.disposemymeds.org.          This list is accurate as of 4/9/18 11:59 PM.  Always use your most recent med list.                   Brand Name Dispense Instructions for use Diagnosis    amLODIPine 5 MG tablet    NORVASC    90 tablet    TAKE 1 TABLET BY MOUTH ONCE DAILY    Other chest pain       aspirin 81 MG tablet     90 tablet    Take 1 tablet by mouth daily.        atorvastatin 40 MG tablet    LIPITOR    90 tablet    Take 1 tablet (40 mg) by mouth daily    Coronary artery disease involving native coronary artery of native heart without angina pectoris       carvedilol 6.25 MG tablet    COREG    180 tablet    Take 1 tablet (6.25 mg) by mouth 2 times daily (with meals)    Coronary artery disease involving native coronary artery of native heart without angina pectoris       escitalopram 20 MG tablet    LEXAPRO    90 tablet    Take 1 tablet (20 mg) by mouth daily    Mild episode of recurrent major depressive disorder (H)       fluticasone 50 MCG/ACT spray    FLONASE    1 Bottle    Spray 1-2 sprays into both nostrils daily    Coryza       IBUPROFEN PO      Take 200 mg by mouth        * levothyroxine 25 MCG tablet    SYNTHROID/LEVOTHROID    45 tablet    Take 1 every other day alternating with 50 mcg.    Acquired hypothyroidism       * levothyroxine 50 MCG tablet    SYNTHROID/LEVOTHROID    45 tablet    1 po every other day alternating with 25 mcg    Acquired hypothyroidism       lisinopril 5 MG tablet    PRINIVIL/ZESTRIL    90 tablet    TAKE 1 TABLET BY MOUTH ONCE DAILY    Coronary artery disease involving native coronary artery of  native heart without angina pectoris       nitroGLYcerin 0.4 MG sublingual tablet    NITROSTAT    25 tablet    Place 1 tablet (0.4 mg) under the tongue every 5 minutes as needed for chest pain    CAD (coronary artery disease)       traZODone 50 MG tablet    DESYREL    180 tablet    Take 2 tablets (100 mg) by mouth At Bedtime    Insomnia, unspecified type       zolpidem 5 MG tablet    AMBIEN    30 tablet    Take 1 tablet (5 mg) by mouth nightly as needed for sleep    Insomnia, unspecified type       * Notice:  This list has 2 medication(s) that are the same as other medications prescribed for you. Read the directions carefully, and ask your doctor or other care provider to review them with you.

## 2018-04-09 NOTE — NURSING NOTE
"Chief Complaint   Patient presents with     URI     x 3-4 days        Initial /71 (BP Location: Right arm, Patient Position: Chair, Cuff Size: Adult Regular)  Pulse 72  Temp 98.2  F (36.8  C) (Oral)  Resp 16  Ht 5' 4\" (1.626 m)  Wt 150 lb (68 kg)  LMP 07/18/2013  SpO2 97%  Breastfeeding? No  BMI 25.75 kg/m2 Estimated body mass index is 25.75 kg/(m^2) as calculated from the following:    Height as of this encounter: 5' 4\" (1.626 m).    Weight as of this encounter: 150 lb (68 kg).  Medication Reconciliation: complete rt arm Brigette Sherman MA      "

## 2018-04-10 LAB
ALBUMIN SERPL-MCNC: 3.9 G/DL (ref 3.4–5)
ALP SERPL-CCNC: 80 U/L (ref 40–150)
ALT SERPL W P-5'-P-CCNC: 28 U/L (ref 0–50)
ANION GAP SERPL CALCULATED.3IONS-SCNC: 6 MMOL/L (ref 3–14)
AST SERPL W P-5'-P-CCNC: 15 U/L (ref 0–45)
BILIRUB SERPL-MCNC: 1.3 MG/DL (ref 0.2–1.3)
BUN SERPL-MCNC: 11 MG/DL (ref 7–30)
CALCIUM SERPL-MCNC: 8.6 MG/DL (ref 8.5–10.1)
CHLORIDE SERPL-SCNC: 109 MMOL/L (ref 94–109)
CO2 SERPL-SCNC: 26 MMOL/L (ref 20–32)
CREAT SERPL-MCNC: 0.83 MG/DL (ref 0.52–1.04)
GFR SERPL CREATININE-BSD FRML MDRD: 72 ML/MIN/1.7M2
GLUCOSE SERPL-MCNC: 95 MG/DL (ref 70–99)
LIPASE SERPL-CCNC: 136 U/L (ref 73–393)
POTASSIUM SERPL-SCNC: 4.5 MMOL/L (ref 3.4–5.3)
PROT SERPL-MCNC: 7.1 G/DL (ref 6.8–8.8)
SODIUM SERPL-SCNC: 141 MMOL/L (ref 133–144)
TSH SERPL DL<=0.005 MIU/L-ACNC: 1.68 MU/L (ref 0.4–4)

## 2018-04-13 ENCOUNTER — TELEPHONE (OUTPATIENT)
Dept: INTERNAL MEDICINE | Facility: CLINIC | Age: 53
End: 2018-04-13

## 2018-04-13 NOTE — TELEPHONE ENCOUNTER
Panel Management Review      Patient has the following on her problem list:     Hypertension   Last three blood pressure readings:  BP Readings from Last 3 Encounters:   04/09/18 108/71   11/22/17 96/57   10/27/17 100/64     Blood pressure: Passed    HTN Guidelines:  Age 18-59 BP range:  Less than 140/90  Age 60-85 with Diabetes:  Less than 140/90  Age 60-85 without Diabetes:  less than 150/90      Composite cancer screening  Chart review shows that this patient is due/due soon for the following Mammogram  Summary:    Patient is due/failing the following:   MAMMOGRAM and PHQ9    Action needed:   Need to complete PHQ-9 form. Schedule Mammogram     Type of outreach:    Sent letter.    Questions for provider review:    None                                                                                                                                     Lucian Messina CMA       Chart routed to CLOSED .

## 2018-04-13 NOTE — LETTER
[facility]  [address], [address]  [place], [place]  [address]                                            TEL:[phone]  FAX:[phone]      Fallon Pizano  [address]  [facility] [address]          April 13, 2018    Dear Fallon,    Just a friendly reminder that you are due for a Mammogram. We do not have a recorded last mammogram for you on file.     If you have had a mammogram somewhere else, not within the [facility], you can simply update us with where you had it done, results, and recommendation. Also include the date as well so we can accurately abstract that into your chart.     Please complete the PHQ9- depression questionaire enclosed with this letter and return it by fax [phone] or you can mail it back.      Please call the clinic at [phone] if you have any questions.     Thank you for trusting us in your care.        Sincerely,      Gabriella Carter M.D.

## 2018-04-19 DIAGNOSIS — I25.10 CORONARY ARTERY DISEASE INVOLVING NATIVE CORONARY ARTERY OF NATIVE HEART WITHOUT ANGINA PECTORIS: ICD-10-CM

## 2018-04-19 RX ORDER — LISINOPRIL 5 MG/1
TABLET ORAL
Qty: 90 TABLET | Refills: 1 | Status: SHIPPED | OUTPATIENT
Start: 2018-04-19 | End: 2018-10-21

## 2018-04-19 NOTE — TELEPHONE ENCOUNTER
"Last OV 10/27/17    Requested Prescriptions   Pending Prescriptions Disp Refills     lisinopril (PRINIVIL/ZESTRIL) 5 MG tablet [Pharmacy Med Name: LISINOPRIL 5 MG TABLET] 90 tablet 1     Sig: TAKE 1 TABLET BY MOUTH ONCE DAILY    ACE Inhibitors (Including Combos) Protocol Passed    4/19/2018  1:20 AM       Passed - Blood pressure under 140/90 in past 12 months    BP Readings from Last 3 Encounters:   04/09/18 108/71   11/22/17 96/57   10/27/17 100/64                Passed - Recent (12 mo) or future (30 days) visit within the authorizing provider's specialty    Patient had office visit in the last 12 months or has a visit in the next 30 days with authorizing provider or within the authorizing provider's specialty.  See \"Patient Info\" tab in inbasket, or \"Choose Columns\" in Meds & Orders section of the refill encounter.           Passed - Patient is age 18 or older       Passed - No active pregnancy on record       Passed - Normal serum creatinine on file in past 12 months    Recent Labs   Lab Test  04/09/18   1546   CR  0.83            Passed - Normal serum potassium on file in past 12 months    Recent Labs   Lab Test  04/09/18   1546   POTASSIUM  4.5            Passed - No positive pregnancy test in past 12 months          "

## 2018-04-28 ENCOUNTER — TELEPHONE (OUTPATIENT)
Dept: MAMMOGRAPHY | Facility: CLINIC | Age: 53
End: 2018-04-28

## 2018-05-14 ENCOUNTER — TELEPHONE (OUTPATIENT)
Dept: INTERNAL MEDICINE | Facility: CLINIC | Age: 53
End: 2018-05-14

## 2018-05-14 NOTE — TELEPHONE ENCOUNTER
Panel Management Review      Patient has the following on her problem list:     Depression / Dysthymia review    Measure:  Needs PHQ-9 score of 4 or less during index window.  Administer PHQ-9 and if score is 5 or more, send encounter to provider for next steps.    5 - 7 month window range: Yes due    PHQ-9 SCORE 4/6/2017 7/13/2017 10/12/2017   Total Score - - -   Total Score 11 13 9       If PHQ-9 recheck is 5 or more, route to provider for next steps.    Patient is due for:  PHQ9      Composite cancer screening  Chart review shows that this patient is due/due soon for the following Mammogram  Summary:    Patient is due/failing the following:   MAMMOGRAM    Action needed:   No action require- pt schedule to complete mammogram 05/15/2018. Pt is due to complete PHQ9    Type of outreach:    Sent [employer] message.    Questions for provider review:    None                                                                                                                                     Lucian Messina [facility]       Chart routed to Care Team .

## 2018-05-15 ENCOUNTER — HOSPITAL ENCOUNTER (OUTPATIENT)
Dept: MAMMOGRAPHY | Facility: CLINIC | Age: 53
Discharge: HOME | End: 2018-05-15
Attending: INTERNAL MEDICINE | Admitting: INTERNAL MEDICINE
Payer: COMMERCIAL

## 2018-05-15 DIAGNOSIS — Z12.31 VISIT FOR SCREENING MAMMOGRAM: ICD-10-CM

## 2018-05-15 PROCEDURE — 77067 SCR MAMMO BI INCL CAD: CPT

## 2018-07-01 DIAGNOSIS — R07.89 OTHER CHEST PAIN: ICD-10-CM

## 2018-07-02 NOTE — TELEPHONE ENCOUNTER
"Requested Prescriptions   Pending Prescriptions Disp Refills     amLODIPine (NORVASC) 5 MG tablet [Pharmacy Med Name: AMLODIPINE BESYLATE 5 MG TAB] 90 tablet 0    Last Written Prescription Date:  03/29/2018  Last Fill Quantity: 90,  # refills: 0   Last office visit: 10/27/2017 with prescribing provider:     Future Office Visit:   Sig: TAKE 1 TABLET BY MOUTH ONCE DAILY    Calcium Channel Blockers Protocol  Passed    7/1/2018  8:36 PM       Passed - Blood pressure under 140/90 in past 12 months    BP Readings from Last 3 Encounters:   04/09/18 108/71   11/22/17 96/57   10/27/17 100/64                Passed - Recent (12 mo) or future (30 days) visit within the authorizing provider's specialty    Patient had office visit in the last 12 months or has a visit in the next 30 days with authorizing provider or within the authorizing provider's specialty.  See \"Patient Info\" tab in inbasket, or \"Choose Columns\" in Meds & Orders section of the refill encounter.           Passed - Patient is age 18 or older       Passed - No active pregnancy on record       Passed - Normal serum creatinine on file in past 12 months    Recent Labs   Lab Test  04/09/18   1546   CR  0.83            Passed - No positive pregnancy test in past 12 months        "

## 2018-07-06 RX ORDER — AMLODIPINE BESYLATE 5 MG/1
TABLET ORAL
Qty: 90 TABLET | Refills: 0 | Status: SHIPPED | OUTPATIENT
Start: 2018-07-06 | End: 2018-10-02

## 2018-07-06 NOTE — TELEPHONE ENCOUNTER
Medication is being filled for 1 time refill only due to:  Patient needs to be seen because due for appt in Oct 2018.     [employer] message sent.

## 2018-08-04 DIAGNOSIS — I25.10 CORONARY ARTERY DISEASE INVOLVING NATIVE CORONARY ARTERY OF NATIVE HEART WITHOUT ANGINA PECTORIS: ICD-10-CM

## 2018-08-04 RX ORDER — CARVEDILOL 6.25 MG/1
TABLET ORAL
Qty: 180 TABLET | Refills: 0 | Status: SHIPPED | OUTPATIENT
Start: 2018-08-04 | End: 2018-10-26

## 2018-08-20 DIAGNOSIS — I25.10 CORONARY ARTERY DISEASE INVOLVING NATIVE CORONARY ARTERY OF NATIVE HEART WITHOUT ANGINA PECTORIS: ICD-10-CM

## 2018-08-20 DIAGNOSIS — E03.9 ACQUIRED HYPOTHYROIDISM: ICD-10-CM

## 2018-08-20 NOTE — TELEPHONE ENCOUNTER
"Requested Prescriptions   Pending Prescriptions Disp Refills     levothyroxine (SYNTHROID/LEVOTHROID) 50 MCG tablet [Pharmacy Med Name: LEVOTHYROXINE 50 MCG TABLET]  Last Written Prescription Date:  10/30/2017  Last Fill Quantity: 45,  # refills: 3   Last office visit: 10/27/2017 with prescribing provider:     Future Office Visit:   90 tablet 3     Sig: TAKE 1 TABLET BY MOUTH ONCE DAILY    Thyroid Protocol Passed    8/20/2018  2:26 AM       Passed - Patient is 12 years or older       Passed - Recent (12 mo) or future (30 days) visit within the authorizing provider's specialty    Patient had office visit in the last 12 months or has a visit in the next 30 days with authorizing provider or within the authorizing provider's specialty.  See \"Patient Info\" tab in inbasket, or \"Choose Columns\" in Meds & Orders section of the refill encounter.           Passed - Normal TSH on file in past 12 months    Recent Labs   Lab Test  04/09/18   1546   TSH  1.68             Passed - No active pregnancy on record    If patient is pregnant or has had a positive pregnancy test, please check TSH.         Passed - No positive pregnancy test in past 12 months    If patient is pregnant or has had a positive pregnancy test, please check TSH.          atorvastatin (LIPITOR) 40 MG tablet [Pharmacy Med Name: ATORVASTATIN 40 MG TABLET]  Last Written Prescription Date:  7/13/2017  Last Fill Quantity: 90,  # refills: 3   Last office visit: 10/27/2017 with prescribing provider:     Future Office Visit:   90 tablet 3     Sig: TAKE 1 TABLET (40 MG) BY MOUTH DAILY    Statins Protocol Failed    8/20/2018  2:26 AM       Failed - LDL on file in past 12 months    Recent Labs   Lab Test  04/06/17   0926   LDL  67            Passed - No abnormal creatine kinase in past 12 months    No lab results found.            Passed - Recent (12 mo) or future (30 days) visit within the authorizing provider's specialty    Patient had office visit in the last 12 months " "or has a visit in the next 30 days with authorizing provider or within the authorizing provider's specialty.  See \"Patient Info\" tab in inbasket, or \"Choose Columns\" in Meds & Orders section of the refill encounter.           Passed - Patient is age 18 or older       Passed - No active pregnancy on record       Passed - No positive pregnancy test in past 12 months        "

## 2018-08-23 RX ORDER — ATORVASTATIN CALCIUM 40 MG/1
TABLET, FILM COATED ORAL
Qty: 30 TABLET | Refills: 0 | Status: SHIPPED | OUTPATIENT
Start: 2018-08-23 | End: 2018-10-05

## 2018-08-23 RX ORDER — LEVOTHYROXINE SODIUM 50 UG/1
TABLET ORAL
Qty: 90 TABLET | Refills: 0 | Status: SHIPPED | OUTPATIENT
Start: 2018-08-23 | End: 2018-09-18

## 2018-09-02 DIAGNOSIS — F33.0 MILD EPISODE OF RECURRENT MAJOR DEPRESSIVE DISORDER (H): ICD-10-CM

## 2018-09-06 RX ORDER — ESCITALOPRAM OXALATE 20 MG/1
TABLET ORAL
Qty: 90 TABLET | Refills: 0 | Status: SHIPPED | OUTPATIENT
Start: 2018-09-06 | End: 2018-12-01

## 2018-09-06 NOTE — TELEPHONE ENCOUNTER
"Pharmacy calling to check status of refill.  States patient will be out of medication soon.    Last office visit 10-27-17    Requested Prescriptions   Pending Prescriptions Disp Refills     escitalopram (LEXAPRO) 20 MG tablet [Pharmacy Med Name: ESCITALOPRAM 20 MG TABLET] 90 tablet 3     Sig: TAKE 1 TABLET (20 MG) BY MOUTH DAILY    SSRIs Protocol Failed    9/2/2018  2:21 AM       Failed - PHQ-9 score less than 5 in past 6 months    Please review last PHQ-9 score.          Failed - Recent (6 mo) or future (30 days) visit within the authorizing provider's specialty    Patient had office visit in the last 6 months or has a visit in the next 30 days with authorizing provider or within the authorizing provider's specialty.  See \"Patient Info\" tab in inbasket, or \"Choose Columns\" in Meds & Orders section of the refill encounter.           Passed - Patient is age 18 or older       Passed - No active pregnancy on record       Passed - No positive pregnancy test in last 12 months          PHQ-9 score:    PHQ-9 SCORE 10/12/2017   Total Score -   Total Score 9       Routing refill request to provider for review/approval because:  Protocol failed.    Please advise, thanks.              "

## 2018-09-18 ENCOUNTER — OFFICE VISIT (OUTPATIENT)
Dept: INTERNAL MEDICINE | Facility: CLINIC | Age: 53
End: 2018-09-18
Payer: COMMERCIAL

## 2018-09-18 VITALS
HEART RATE: 85 BPM | OXYGEN SATURATION: 96 % | HEIGHT: 64 IN | SYSTOLIC BLOOD PRESSURE: 122 MMHG | TEMPERATURE: 98.7 F | DIASTOLIC BLOOD PRESSURE: 72 MMHG | RESPIRATION RATE: 16 BRPM | BODY MASS INDEX: 25.4 KG/M2 | WEIGHT: 148.8 LBS

## 2018-09-18 DIAGNOSIS — Z23 NEED FOR PROPHYLACTIC VACCINATION AND INOCULATION AGAINST INFLUENZA: ICD-10-CM

## 2018-09-18 DIAGNOSIS — E78.5 HYPERLIPIDEMIA LDL GOAL <70: ICD-10-CM

## 2018-09-18 DIAGNOSIS — I25.10 CORONARY ARTERY DISEASE INVOLVING NATIVE CORONARY ARTERY OF NATIVE HEART WITHOUT ANGINA PECTORIS: ICD-10-CM

## 2018-09-18 DIAGNOSIS — Z00.00 ENCOUNTER FOR ROUTINE ADULT HEALTH EXAMINATION WITHOUT ABNORMAL FINDINGS: Primary | ICD-10-CM

## 2018-09-18 DIAGNOSIS — F33.0 MILD EPISODE OF RECURRENT MAJOR DEPRESSIVE DISORDER (H): ICD-10-CM

## 2018-09-18 DIAGNOSIS — E03.9 ACQUIRED HYPOTHYROIDISM: ICD-10-CM

## 2018-09-18 DIAGNOSIS — R73.09 ABNORMAL BLOOD SUGAR: ICD-10-CM

## 2018-09-18 DIAGNOSIS — Z11.59 SCREENING FOR VIRAL DISEASE: ICD-10-CM

## 2018-09-18 DIAGNOSIS — Z23 NEED FOR TDAP VACCINATION: ICD-10-CM

## 2018-09-18 DIAGNOSIS — M79.10 MUSCLE PAIN: ICD-10-CM

## 2018-09-18 LAB
BASOPHILS # BLD AUTO: 0 10E9/L (ref 0–0.2)
BASOPHILS NFR BLD AUTO: 0.6 %
DIFFERENTIAL METHOD BLD: NORMAL
EOSINOPHIL # BLD AUTO: 0.1 10E9/L (ref 0–0.7)
EOSINOPHIL NFR BLD AUTO: 2.4 %
ERYTHROCYTE [DISTWIDTH] IN BLOOD BY AUTOMATED COUNT: 13.9 % (ref 10–15)
ERYTHROCYTE [SEDIMENTATION RATE] IN BLOOD BY WESTERGREN METHOD: 9 MM/H (ref 0–30)
HBA1C MFR BLD: 6 % (ref 0–5.6)
HCT VFR BLD AUTO: 43.3 % (ref 35–47)
HGB BLD-MCNC: 14.1 G/DL (ref 11.7–15.7)
LYMPHOCYTES # BLD AUTO: 1.6 10E9/L (ref 0.8–5.3)
LYMPHOCYTES NFR BLD AUTO: 29.7 %
MCH RBC QN AUTO: 31.1 PG (ref 26.5–33)
MCHC RBC AUTO-ENTMCNC: 32.6 G/DL (ref 31.5–36.5)
MCV RBC AUTO: 96 FL (ref 78–100)
MONOCYTES # BLD AUTO: 0.4 10E9/L (ref 0–1.3)
MONOCYTES NFR BLD AUTO: 7.9 %
NEUTROPHILS # BLD AUTO: 3.2 10E9/L (ref 1.6–8.3)
NEUTROPHILS NFR BLD AUTO: 59.4 %
PLATELET # BLD AUTO: 266 10E9/L (ref 150–450)
RBC # BLD AUTO: 4.53 10E12/L (ref 3.8–5.2)
WBC # BLD AUTO: 5.4 10E9/L (ref 4–11)

## 2018-09-18 PROCEDURE — 99396 PREV VISIT EST AGE 40-64: CPT | Mod: 25 | Performed by: INTERNAL MEDICINE

## 2018-09-18 PROCEDURE — 99213 OFFICE O/P EST LOW 20 MIN: CPT | Mod: 25 | Performed by: INTERNAL MEDICINE

## 2018-09-18 PROCEDURE — 84443 ASSAY THYROID STIM HORMONE: CPT | Performed by: INTERNAL MEDICINE

## 2018-09-18 PROCEDURE — 87389 HIV-1 AG W/HIV-1&-2 AB AG IA: CPT | Performed by: INTERNAL MEDICINE

## 2018-09-18 PROCEDURE — 83036 HEMOGLOBIN GLYCOSYLATED A1C: CPT | Performed by: INTERNAL MEDICINE

## 2018-09-18 PROCEDURE — 80053 COMPREHEN METABOLIC PANEL: CPT | Performed by: INTERNAL MEDICINE

## 2018-09-18 PROCEDURE — 90471 IMMUNIZATION ADMIN: CPT | Performed by: INTERNAL MEDICINE

## 2018-09-18 PROCEDURE — 36415 COLL VENOUS BLD VENIPUNCTURE: CPT | Performed by: INTERNAL MEDICINE

## 2018-09-18 PROCEDURE — 90686 IIV4 VACC NO PRSV 0.5 ML IM: CPT | Performed by: INTERNAL MEDICINE

## 2018-09-18 PROCEDURE — 90715 TDAP VACCINE 7 YRS/> IM: CPT | Performed by: INTERNAL MEDICINE

## 2018-09-18 PROCEDURE — 82550 ASSAY OF CK (CPK): CPT | Performed by: INTERNAL MEDICINE

## 2018-09-18 PROCEDURE — 80061 LIPID PANEL: CPT | Performed by: INTERNAL MEDICINE

## 2018-09-18 PROCEDURE — 90472 IMMUNIZATION ADMIN EACH ADD: CPT | Performed by: INTERNAL MEDICINE

## 2018-09-18 PROCEDURE — 85025 COMPLETE CBC W/AUTO DIFF WBC: CPT | Performed by: INTERNAL MEDICINE

## 2018-09-18 PROCEDURE — 85652 RBC SED RATE AUTOMATED: CPT | Performed by: INTERNAL MEDICINE

## 2018-09-18 ASSESSMENT — PATIENT HEALTH QUESTIONNAIRE - PHQ9
10. IF YOU CHECKED OFF ANY PROBLEMS, HOW DIFFICULT HAVE THESE PROBLEMS MADE IT FOR YOU TO DO YOUR WORK, TAKE CARE OF THINGS AT HOME, OR GET ALONG WITH OTHER PEOPLE: SOMEWHAT DIFFICULT
SUM OF ALL RESPONSES TO PHQ QUESTIONS 1-9: 9
SUM OF ALL RESPONSES TO PHQ QUESTIONS 1-9: 9

## 2018-09-18 NOTE — PATIENT INSTRUCTIONS
Stop the Atorvastatin to see if the muscle pain goes away. Contact me once you are able to tell if it is causing problems.         PREVENTIVE HEALTH RECOMMENDATIONS:     Vaccines: Get a flu shot each year. Get a tetanus shot every 10 years.     Exercise for at least 150 minutes a week (an average of 30 minutes a day, 5 days of the week). This will help you control your weight and prevent disease.    Limit alcohol to one drink per day.    No smoking.     Wear sunscreen to prevent skin cancer.     See your dentist twice a year for an exam and cleaning.    Try to get Calcium 1200 mg total per day. It is best to not take it all at once. Try to get Vitamin D at least 6602-4685 units per day.    BMI or Body Mass Index is a way of indicating weight and health risk for cardiovascular diseases, high blood pressure, diabetes.   Definitions:    Underweight is less than 18.5 and will be associated with health risk.   Normal BMI is 18.5 to 25   Overweight is 25-29   Obesity is 30 or greater   Morbid Obesity is 40 or greater or 35 or greater with diabetes, prediabetes or abnormal blood sugar, high blood pressure or elevated cholesterol  Obesity and Morbid Obesity are associated with higher health risks. Lowering calories, exercising more may lower your BMI and even small decreases can have positive impact on lowering health risks.   Your Body mass index is 25.54 kg/(m^2)..,
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
## 2018-09-18 NOTE — NURSING NOTE
"/72  Pulse 85  Temp 98.7  F (37.1  C) (Oral)  Resp 16  Ht 5' 4\" (1.626 m)  Wt 148 lb 12.8 oz (67.5 kg)  LMP 07/18/2013  SpO2 96%  BMI 25.54 kg/m2    "

## 2018-09-19 LAB
ALBUMIN SERPL-MCNC: 3.8 G/DL (ref 3.4–5)
ALP SERPL-CCNC: 79 U/L (ref 40–150)
ALT SERPL W P-5'-P-CCNC: 36 U/L (ref 0–50)
ANION GAP SERPL CALCULATED.3IONS-SCNC: 9 MMOL/L (ref 3–14)
AST SERPL W P-5'-P-CCNC: 20 U/L (ref 0–45)
BILIRUB SERPL-MCNC: 1.3 MG/DL (ref 0.2–1.3)
BUN SERPL-MCNC: 10 MG/DL (ref 7–30)
CALCIUM SERPL-MCNC: 8.9 MG/DL (ref 8.5–10.1)
CHLORIDE SERPL-SCNC: 108 MMOL/L (ref 94–109)
CHOLEST SERPL-MCNC: 139 MG/DL
CK SERPL-CCNC: 91 U/L (ref 30–225)
CO2 SERPL-SCNC: 24 MMOL/L (ref 20–32)
CREAT SERPL-MCNC: 0.74 MG/DL (ref 0.52–1.04)
GFR SERPL CREATININE-BSD FRML MDRD: 82 ML/MIN/1.7M2
GLUCOSE SERPL-MCNC: 116 MG/DL (ref 70–99)
HDLC SERPL-MCNC: 47 MG/DL
HIV 1+2 AB+HIV1 P24 AG SERPL QL IA: NONREACTIVE
LDLC SERPL CALC-MCNC: 44 MG/DL
NONHDLC SERPL-MCNC: 92 MG/DL
POTASSIUM SERPL-SCNC: 4.2 MMOL/L (ref 3.4–5.3)
PROT SERPL-MCNC: 7.3 G/DL (ref 6.8–8.8)
SODIUM SERPL-SCNC: 141 MMOL/L (ref 133–144)
TRIGL SERPL-MCNC: 238 MG/DL
TSH SERPL DL<=0.005 MIU/L-ACNC: 2.13 MU/L (ref 0.4–4)

## 2018-09-20 ASSESSMENT — PATIENT HEALTH QUESTIONNAIRE - PHQ9: SUM OF ALL RESPONSES TO PHQ QUESTIONS 1-9: 9

## 2018-09-21 ENCOUNTER — TELEPHONE (OUTPATIENT)
Dept: INTERNAL MEDICINE | Facility: CLINIC | Age: 53
End: 2018-09-21

## 2018-09-21 NOTE — TELEPHONE ENCOUNTER
Patient calls, she received flu and Tdap vaccines on Tuesday. Wednesday she was not feeling well, vomiting on Thursday and body aches and headache today. No longer nauseated or vomiting today. Patient asks if this could be a reaction to the immunizations. Informed her these are common side effects to the immunizations she received. These symptoms should improved over the weekend. Recommended taking Ibuprofen for body aches and call if she develops high fever, repeated vomiting or symptoms do not improve by Monday. Patient verbalizes understanding.

## 2018-10-02 DIAGNOSIS — R07.89 OTHER CHEST PAIN: ICD-10-CM

## 2018-10-02 NOTE — TELEPHONE ENCOUNTER
"Requested Prescriptions   Pending Prescriptions Disp Refills     amLODIPine (NORVASC) 5 MG tablet [Pharmacy Med Name: AMLODIPINE BESYLATE 5 MG TAB]  Last Written Prescription Date:  7/6/2018  Last Fill Quantity: 90,  # refills: 0   Last office visit: 9/18/2018 with prescribing provider:     Future Office Visit:   90 tablet 0     Sig: TAKE 1 TABLET BY MOUTH ONCE DAILY.    Calcium Channel Blockers Protocol  Passed    10/2/2018  2:20 AM       Passed - Blood pressure under 140/90 in past 12 months    BP Readings from Last 3 Encounters:   09/18/18 122/72   04/09/18 108/71   11/22/17 96/57                Passed - Recent (12 mo) or future (30 days) visit within the authorizing provider's specialty    Patient had office visit in the last 12 months or has a visit in the next 30 days with authorizing provider or within the authorizing provider's specialty.  See \"Patient Info\" tab in inbasket, or \"Choose Columns\" in Meds & Orders section of the refill encounter.           Passed - Patient is age 18 or older       Passed - No active pregnancy on record       Passed - Normal serum creatinine on file in past 12 months    Recent Labs   Lab Test  09/18/18   0930   CR  0.74            Passed - No positive pregnancy test in past 12 months        "

## 2018-10-03 RX ORDER — AMLODIPINE BESYLATE 5 MG/1
TABLET ORAL
Qty: 90 TABLET | Refills: 1 | Status: SHIPPED | OUTPATIENT
Start: 2018-10-03 | End: 2019-03-17

## 2018-10-05 ENCOUNTER — OFFICE VISIT (OUTPATIENT)
Dept: CARDIOLOGY | Facility: CLINIC | Age: 53
End: 2018-10-05
Payer: COMMERCIAL

## 2018-10-05 VITALS
SYSTOLIC BLOOD PRESSURE: 120 MMHG | HEART RATE: 68 BPM | BODY MASS INDEX: 25.81 KG/M2 | DIASTOLIC BLOOD PRESSURE: 70 MMHG | HEIGHT: 64 IN | WEIGHT: 151.2 LBS

## 2018-10-05 DIAGNOSIS — I25.10 CORONARY ARTERY DISEASE INVOLVING NATIVE CORONARY ARTERY OF NATIVE HEART WITHOUT ANGINA PECTORIS: Primary | ICD-10-CM

## 2018-10-05 DIAGNOSIS — R07.89 OTHER CHEST PAIN: ICD-10-CM

## 2018-10-05 PROCEDURE — 99214 OFFICE O/P EST MOD 30 MIN: CPT | Performed by: INTERNAL MEDICINE

## 2018-10-05 PROCEDURE — 93000 ELECTROCARDIOGRAM COMPLETE: CPT | Performed by: INTERNAL MEDICINE

## 2018-10-05 RX ORDER — ROSUVASTATIN CALCIUM 20 MG/1
20 TABLET, COATED ORAL DAILY
Qty: 90 TABLET | Refills: 3 | Status: SHIPPED | OUTPATIENT
Start: 2018-10-05 | End: 2019-04-25 | Stop reason: SINTOL

## 2018-10-05 NOTE — PATIENT INSTRUCTIONS
Try to take less ibuprofen because it can increase the heart attack risk and irritate the stomach.    If you do have to take it, take it on a full stomach and consider PRILOSEC over the counter as an antacid.    We will try to tackle the shoulder pain directly if it's due to the ATORVASTATIN and change to ROSUVASTATIN which generally has less muscle ache pain.

## 2018-10-05 NOTE — LETTER
10/5/2018    Gabriella Carter MD  [address] [facility] [address]  [facility] [address]    RE: Fallon Thony Pizano       Dear Colleague,    I had the pleasure of seeing Fallon Thony Pizano in the [facility].    Cardiology Progress Note          Assessment and Plan:     1. Coronary artery disease status post PCI of the LAD 2011, no recurrent symptoms    Intermittent angina well controlled on her amlodipine.  No change in her functional status.    We discussed that if she has decrease in her functional status, she should let us know and we can consider further ischemic evaluation.      2. Myalgias, possibly due to atorvastatin    Change to rosuvastatin.  Limit ibuprofen.      3. Hypertension, good control    Continue current medications.    Routine follow-up in 1 year if remains asymptomatic.      This note was transcribed using electronic voice recognition software and there may be typographical errors present.                Interval History:   The patient is a very pleasant 53 year old whom I have seen previously for coronary artery disease and PCI of the LAD 2011.  She has some myalgias, likely from her atorvastatin that has caused her to take copious amounts of ibuprofen.  She has been taking 400 mg twice per day for the last month due to diffuse myalgias.  She does take it with food, she was not aware of the increased cardiovascular risk with NSAIDs other than aspirin.    She currently denies any chest discomfort.  She feels like the amlodipine has helped in that regard.  She denies any dyspnea on exertion, she does have some fatigue.                     Review of Systems:   Review of Systems:  Skin:  Negative     Eyes:  Positive for contacts;glasses  ENT:  Positive for postnasal drainage;sinus trouble  Respiratory:  Negative    Cardiovascular:    palpitations;Positive for;fatigue  Gastroenterology: Negative    Genitourinary:  Negative    Musculoskeletal:  Positive for joint  "pain  Neurologic:  Positive for headaches  Psychiatric:  Negative    Heme/Lymph/Imm:  Positive for allergies  Endocrine:  Positive for thyroid disorder              Physical Exam:     Vitals: /70 (BP Location: Right arm, Patient Position: Sitting, Cuff Size: Adult Regular)  Pulse 68  Ht 1.626 m (5' 4\")  Wt 68.6 kg (151 lb 3.2 oz)  LMP 07/18/2013  Breastfeeding? No  BMI 25.95 kg/m2  Constitutional:  cooperative, alert and oriented, well developed, well nourished, in no acute distress        Skin:  warm and dry to the touch;no apparent skin lesions or masses noted        Head:  normocephalic, no masses or lesions        Eyes:  pupils equal and round, conjunctivae and lids unremarkable, sclera white, no xanthalasma, EOMS intact, no nystagmus        ENT:  no pallor or cyanosis        Neck:  no carotid bruit        Chest:  normal breath sounds, clear to auscultation, normal A-P diameter, normal symmetry, normal respiratory excursion, no use of accessory muscles;clear to auscultation        Cardiac: regular rhythm;normal S1 and S2;no murmurs, gallops or rubs detected                  Abdomen:      benign    Extremities and Back:  no edema;no deformities, clubbing, cyanosis, erythema observed        Neurological:  affect appropriate;no gross motor deficits                 Medications:     Current Outpatient Prescriptions   Medication Sig Dispense Refill     amLODIPine (NORVASC) 5 MG tablet TAKE 1 TABLET BY MOUTH ONCE DAILY. 90 tablet 1     aspirin 81 MG tablet Take 1 tablet by mouth daily. 90 tablet 3     carvedilol (COREG) 6.25 MG tablet TAKE 1 TABLET (6.25 MG) BY MOUTH 2 TIMES DAILY (WITH MEALS) 180 tablet 0     DiphenhydrAMINE HCl (BENADRYL ALLERGY PO)        escitalopram (LEXAPRO) 20 MG tablet TAKE 1 TABLET (20 MG) BY MOUTH DAILY 90 tablet 0     fluticasone (FLONASE) 50 MCG/ACT spray Spray 1-2 sprays into both nostrils daily 1 Bottle 1     IBUPROFEN PO Take 200 mg by mouth       levothyroxine " (SYNTHROID/LEVOTHROID) 25 MCG tablet Take 1 every other day alternating with 50 mcg. 45 tablet 3     levothyroxine (SYNTHROID/LEVOTHROID) 50 MCG tablet 1 po every other day alternating with 25 mcg 45 tablet 3     lisinopril (PRINIVIL/ZESTRIL) 5 MG tablet TAKE 1 TABLET BY MOUTH ONCE DAILY 90 tablet 1     nitroglycerin (NITROSTAT) 0.4 MG SL tablet Place 1 tablet (0.4 mg) under the tongue every 5 minutes as needed for chest pain 25 tablet 0     rosuvastatin (CRESTOR) 20 MG tablet Take 1 tablet (20 mg) by mouth daily 90 tablet 3     traZODone (DESYREL) 50 MG tablet Take 2 tablets (100 mg) by mouth At Bedtime (Patient taking differently: Take 100 mg by mouth At Bedtime Prn) 180 tablet 1     zolpidem (AMBIEN) 5 MG tablet Take 1 tablet (5 mg) by mouth nightly as needed for sleep 30 tablet 1                Data:   All laboratory data reviewed  No results found for this or any previous visit (from the past 24 hour(s)).    All laboratory data reviewed  Lab Results   Component Value Date    CHOL 139 09/18/2018     Lab Results   Component Value Date    HDL 47 09/18/2018     Lab Results   Component Value Date    LDL 44 09/18/2018     Lab Results   Component Value Date    TRIG 238 09/18/2018     Lab Results   Component Value Date    CHOLHDLRATIO 3.0 06/24/2015     TSH   Date Value Ref Range Status   09/18/2018 2.13 0.40 - 4.00 mU/L Final     Last Basic Metabolic Panel:  Lab Results   Component Value Date     09/18/2018      Lab Results   Component Value Date    POTASSIUM 4.2 09/18/2018     Lab Results   Component Value Date    CHLORIDE 108 09/18/2018     Lab Results   Component Value Date    SHANTELL 8.9 09/18/2018     Lab Results   Component Value Date    CO2 24 09/18/2018     Lab Results   Component Value Date    BUN 10 09/18/2018     Lab Results   Component Value Date    CR 0.74 09/18/2018     Lab Results   Component Value Date     09/18/2018     Lab Results   Component Value Date    WBC 5.4 09/18/2018     Lab Results    Component Value Date    RBC 4.53 09/18/2018     Lab Results   Component Value Date    HGB 14.1 09/18/2018     Lab Results   Component Value Date    HCT 43.3 09/18/2018     Lab Results   Component Value Date    MCV 96 09/18/2018     Lab Results   Component Value Date    MCH 31.1 09/18/2018     Lab Results   Component Value Date    MCHC 32.6 09/18/2018     Lab Results   Component Value Date    RDW 13.9 09/18/2018     Lab Results   Component Value Date     09/18/2018       Thank you for allowing me to participate in the care of your patient.    Sincerely,     Babatunde Hernandez MD     [facility]

## 2018-10-05 NOTE — PROGRESS NOTES
Cardiology Progress Note          Assessment and Plan:     1. Coronary artery disease status post PCI of the LAD 2011, no recurrent symptoms    Intermittent angina well controlled on her amlodipine.  No change in her functional status.    We discussed that if she has decrease in her functional status, she should let us know and we can consider further ischemic evaluation.      2. Myalgias, possibly due to atorvastatin    Change to rosuvastatin.  Limit ibuprofen.      3. Hypertension, good control    Continue current medications.    Routine follow-up in 1 year if remains asymptomatic.      This note was transcribed using electronic voice recognition software and there may be typographical errors present.                Interval History:   The patient is a very pleasant 53 year old whom I have seen previously for coronary artery disease and PCI of the LAD 2011.  She has some myalgias, likely from her atorvastatin that has caused her to take copious amounts of ibuprofen.  She has been taking 400 mg twice per day for the last month due to diffuse myalgias.  She does take it with food, she was not aware of the increased cardiovascular risk with NSAIDs other than aspirin.    She currently denies any chest discomfort.  She feels like the amlodipine has helped in that regard.  She denies any dyspnea on exertion, she does have some fatigue.                     Review of Systems:   Review of Systems:  Skin:  Negative     Eyes:  Positive for contacts;glasses  ENT:  Positive for postnasal drainage;sinus trouble  Respiratory:  Negative    Cardiovascular:    palpitations;Positive for;fatigue  Gastroenterology: Negative    Genitourinary:  Negative    Musculoskeletal:  Positive for joint pain  Neurologic:  Positive for headaches  Psychiatric:  Negative    Heme/Lymph/Imm:  Positive for allergies  Endocrine:  Positive for thyroid disorder              Physical Exam:     Vitals: /70 (BP Location: Right arm, Patient Position:  "Sitting, Cuff Size: Adult Regular)  Pulse 68  Ht 1.626 m (5' 4\")  Wt 68.6 kg (151 lb 3.2 oz)  LMP 07/18/2013  Breastfeeding? No  BMI 25.95 kg/m2  Constitutional:  cooperative, alert and oriented, well developed, well nourished, in no acute distress        Skin:  warm and dry to the touch;no apparent skin lesions or masses noted        Head:  normocephalic, no masses or lesions        Eyes:  pupils equal and round, conjunctivae and lids unremarkable, sclera white, no xanthalasma, EOMS intact, no nystagmus        ENT:  no pallor or cyanosis        Neck:  no carotid bruit        Chest:  normal breath sounds, clear to auscultation, normal A-P diameter, normal symmetry, normal respiratory excursion, no use of accessory muscles;clear to auscultation        Cardiac: regular rhythm;normal S1 and S2;no murmurs, gallops or rubs detected                  Abdomen:      benign    Extremities and Back:  no edema;no deformities, clubbing, cyanosis, erythema observed        Neurological:  affect appropriate;no gross motor deficits                 Medications:     Current Outpatient Prescriptions   Medication Sig Dispense Refill     amLODIPine (NORVASC) 5 MG tablet TAKE 1 TABLET BY MOUTH ONCE DAILY. 90 tablet 1     aspirin 81 MG tablet Take 1 tablet by mouth daily. 90 tablet 3     carvedilol (COREG) 6.25 MG tablet TAKE 1 TABLET (6.25 MG) BY MOUTH 2 TIMES DAILY (WITH MEALS) 180 tablet 0     DiphenhydrAMINE HCl (BENADRYL ALLERGY PO)        escitalopram (LEXAPRO) 20 MG tablet TAKE 1 TABLET (20 MG) BY MOUTH DAILY 90 tablet 0     fluticasone (FLONASE) 50 MCG/ACT spray Spray 1-2 sprays into both nostrils daily 1 Bottle 1     IBUPROFEN PO Take 200 mg by mouth       levothyroxine (SYNTHROID/LEVOTHROID) 25 MCG tablet Take 1 every other day alternating with 50 mcg. 45 tablet 3     levothyroxine (SYNTHROID/LEVOTHROID) 50 MCG tablet 1 po every other day alternating with 25 mcg 45 tablet 3     lisinopril (PRINIVIL/ZESTRIL) 5 MG tablet TAKE " 1 TABLET BY MOUTH ONCE DAILY 90 tablet 1     nitroglycerin (NITROSTAT) 0.4 MG SL tablet Place 1 tablet (0.4 mg) under the tongue every 5 minutes as needed for chest pain 25 tablet 0     rosuvastatin (CRESTOR) 20 MG tablet Take 1 tablet (20 mg) by mouth daily 90 tablet 3     traZODone (DESYREL) 50 MG tablet Take 2 tablets (100 mg) by mouth At Bedtime (Patient taking differently: Take 100 mg by mouth At Bedtime Prn) 180 tablet 1     zolpidem (AMBIEN) 5 MG tablet Take 1 tablet (5 mg) by mouth nightly as needed for sleep 30 tablet 1                Data:   All laboratory data reviewed  No results found for this or any previous visit (from the past 24 hour(s)).    All laboratory data reviewed  Lab Results   Component Value Date    CHOL 139 09/18/2018     Lab Results   Component Value Date    HDL 47 09/18/2018     Lab Results   Component Value Date    LDL 44 09/18/2018     Lab Results   Component Value Date    TRIG 238 09/18/2018     Lab Results   Component Value Date    CHOLHDLRATIO 3.0 06/24/2015     TSH   Date Value Ref Range Status   09/18/2018 2.13 0.40 - 4.00 mU/L Final     Last Basic Metabolic Panel:  Lab Results   Component Value Date     09/18/2018      Lab Results   Component Value Date    POTASSIUM 4.2 09/18/2018     Lab Results   Component Value Date    CHLORIDE 108 09/18/2018     Lab Results   Component Value Date    SHANTELL 8.9 09/18/2018     Lab Results   Component Value Date    CO2 24 09/18/2018     Lab Results   Component Value Date    BUN 10 09/18/2018     Lab Results   Component Value Date    CR 0.74 09/18/2018     Lab Results   Component Value Date     09/18/2018     Lab Results   Component Value Date    WBC 5.4 09/18/2018     Lab Results   Component Value Date    RBC 4.53 09/18/2018     Lab Results   Component Value Date    HGB 14.1 09/18/2018     Lab Results   Component Value Date    HCT 43.3 09/18/2018     Lab Results   Component Value Date    MCV 96 09/18/2018     Lab Results   Component  Value Date    MCH 31.1 09/18/2018     Lab Results   Component Value Date    MCHC 32.6 09/18/2018     Lab Results   Component Value Date    RDW 13.9 09/18/2018     Lab Results   Component Value Date     09/18/2018

## 2018-10-05 NOTE — MR AVS SNAPSHOT
After Visit Summary   10/5/2018    Fallon Pizano    MRN: [id number]           Patient Information     Date Of Birth          1965        Visit Information        Provider Department      10/5/2018 9:15 AM Babatunde Hernandez MD [facility]        Today's Diagnoses     Coronary artery disease involving native coronary artery of native heart without angina pectoris    -  1    Other chest pain          Care Instructions    Try to take less ibuprofen because it can increase the heart attack risk and irritate the stomach.    If you do have to take it, take it on a full stomach and consider PRILOSEC over the counter as an antacid.    We will try to tackle the shoulder pain directly if it's due to the ATORVASTATIN and change to ROSUVASTATIN which generally has less muscle ache pain.          Follow-ups after your visit        Additional Services     Follow-Up with Cardiologist                 Future tests that were ordered for you today     Open Future Orders        Priority Expected Expires Ordered    Follow-Up with Cardiologist Routine 10/5/2019 10/6/2019 10/5/2018            Who to contact     If you have questions or need follow up information about today's clinic visit or your schedule please contact [facility] directly at [phone].  Normal or non-critical lab and imaging results will be communicated to you by MyChart, letter or phone within 4 business days after the clinic has received the results. If you do not hear from us within 7 days, please contact the clinic through MyChart or phone. If you have a critical or abnormal lab result, we will notify you by phone as soon as possible.  Submit refill requests through [employer] or call your pharmacy and they will forward the refill request to us. Please allow 3 business days for your refill to be completed.          Additional Information About Your  "Visit        [employer] Information     [employer] gives you secure access to your electronic health record. If you see a primary care provider, you can also send messages to your care team and make appointments. If you have questions, please call your primary care clinic.  If you do not have a primary care provider, please call [phone] and they will assist you.        Care EveryWhere ID     This is your Care EveryWhere ID. This could be used by other organizations to access your [facility] medical records  [id number]        Your Vitals Were     Pulse Height Last Period Breastfeeding? BMI (Body Mass Index)       68 1.626 m (5' 4\") 07/18/2013 No 25.95 kg/m2        Blood Pressure from Last 3 Encounters:   10/05/18 120/70   09/18/18 122/72   04/09/18 108/71    Weight from Last 3 Encounters:   10/05/18 68.6 kg (151 lb 3.2 oz)   09/18/18 67.5 kg (148 lb 12.8 oz)   04/09/18 68 kg (150 lb)              We Performed the Following     EKG 12-lead complete w/read - Clinics (performed today)          Today's Medication Changes          These changes are accurate as of 10/5/18  9:43 AM.  If you have any questions, ask your nurse or doctor.               Start taking these medicines.        Dose/Directions    rosuvastatin 20 MG tablet   Commonly known as:  CRESTOR   Used for:  Coronary artery disease involving native coronary artery of native heart without angina pectoris   Started by:  Babatunde Hernandez MD        Dose:  20 mg   Take 1 tablet (20 mg) by mouth daily   Quantity:  90 tablet   Refills:  3         These medicines have changed or have updated prescriptions.        Dose/Directions    traZODone 50 MG tablet   Commonly known as:  DESYREL   This may have changed:  additional instructions   Used for:  Insomnia, unspecified type        Dose:  100 mg   Take 2 tablets (100 mg) by mouth At Bedtime   Quantity:  180 tablet   Refills:  1         Stop taking these medicines if you haven't already. Please contact your care team " if you have questions.     atorvastatin 40 MG tablet   Commonly known as:  LIPITOR   Stopped by:  Babatunde Hernandez MD                Where to get your medicines      These medications were sent to [facility]/pharmacy #[id number] - [place], [place] - [address] [address]  [address] [address], [place] [address]    Hours:  [facility] Phone:  [phone]     rosuvastatin 20 MG tablet                Primary Care Provider Office Phone # Fax #    Gabriella Carter [phone]       [address] [facility] [address]  [facility] [address]        Equal Access to Services     [facility]: Hadii aad ku hadasho Soomaali, waaxda luqadaha, qaybta kaalmada adeegyada, waxay idiin hayaan adejovani carranza . So [facility] [phone].    ATENCIÓN: Si habla español, tiene a cheung disposición servicios gratuitos de asistencia lingüística. [facility] [phone].    We comply with applicable federal civil rights laws and [place] laws. We do not discriminate on the basis of race, color, national origin, age, disability, sex, sexual orientation, or gender identity.            Thank you!     Thank you for choosing [facility]  for your care. Our goal is always to provide you with excellent care. Hearing back from our patients is one way we can continue to improve our services. Please take a few minutes to complete the written survey that you may receive in the mail after your visit with us. Thank you!             Your Updated Medication List - Protect others around you: Learn how to safely use, store and throw away your medicines at www.disposemymeds.org.          This list is accurate as of 10/5/18  9:43 AM.  Always use your most recent med list.                   Brand Name Dispense Instructions for use Diagnosis    amLODIPine 5 MG tablet    NORVASC    90 tablet    TAKE 1 TABLET BY MOUTH ONCE DAILY.    Other chest pain       aspirin 81 MG tablet     90 tablet    Take 1 tablet by mouth  daily.        BENADRYL ALLERGY PO           carvedilol 6.25 MG tablet    COREG    180 tablet    TAKE 1 TABLET (6.25 MG) BY MOUTH 2 TIMES DAILY (WITH MEALS)    Coronary artery disease involving native coronary artery of native heart without angina pectoris       escitalopram 20 MG tablet    LEXAPRO    90 tablet    TAKE 1 TABLET (20 MG) BY MOUTH DAILY    Mild episode of recurrent major depressive disorder (H)       fluticasone 50 MCG/ACT spray    FLONASE    1 Bottle    Spray 1-2 sprays into both nostrils daily    Coryza       IBUPROFEN PO      Take 200 mg by mouth        * levothyroxine 25 MCG tablet    SYNTHROID/LEVOTHROID    45 tablet    Take 1 every other day alternating with 50 mcg.    Acquired hypothyroidism       * levothyroxine 50 MCG tablet    SYNTHROID/LEVOTHROID    45 tablet    1 po every other day alternating with 25 mcg    Acquired hypothyroidism       lisinopril 5 MG tablet    PRINIVIL/ZESTRIL    90 tablet    TAKE 1 TABLET BY MOUTH ONCE DAILY    Coronary artery disease involving native coronary artery of native heart without angina pectoris       nitroGLYcerin 0.4 MG sublingual tablet    NITROSTAT    25 tablet    Place 1 tablet (0.4 mg) under the tongue every 5 minutes as needed for chest pain    CAD (coronary artery disease)       rosuvastatin 20 MG tablet    CRESTOR    90 tablet    Take 1 tablet (20 mg) by mouth daily    Coronary artery disease involving native coronary artery of native heart without angina pectoris       traZODone 50 MG tablet    DESYREL    180 tablet    Take 2 tablets (100 mg) by mouth At Bedtime    Insomnia, unspecified type       zolpidem 5 MG tablet    AMBIEN    30 tablet    Take 1 tablet (5 mg) by mouth nightly as needed for sleep    Insomnia, unspecified type       * Notice:  This list has 2 medication(s) that are the same as other medications prescribed for you. Read the directions carefully, and ask your doctor or other care provider to review them with you.

## 2018-10-07 DIAGNOSIS — R07.89 OTHER CHEST PAIN: ICD-10-CM

## 2018-10-08 NOTE — TELEPHONE ENCOUNTER
"Requested Prescriptions   Pending Prescriptions Disp Refills     amLODIPine (NORVASC) 5 MG tablet [Pharmacy Med Name: AMLODIPINE BESYLATE 5 MG TAB]  Last Written Prescription Date:  10/3/2018  Last Fill Quantity: 90,  # refills: 1   Last office visit: 9/18/2018 with prescribing provider:     Future Office Visit:   90 tablet 0     Sig: TAKE 1 TABLET BY MOUTH ONCE DAILY.    Calcium Channel Blockers Protocol  Passed    10/7/2018 11:22 AM       Passed - Blood pressure under 140/90 in past 12 months    BP Readings from Last 3 Encounters:   10/05/18 120/70   09/18/18 122/72   04/09/18 108/71                Passed - Recent (12 mo) or future (30 days) visit within the authorizing provider's specialty    Patient had office visit in the last 12 months or has a visit in the next 30 days with authorizing provider or within the authorizing provider's specialty.  See \"Patient Info\" tab in inbasket, or \"Choose Columns\" in Meds & Orders section of the refill encounter.           Passed - Patient is age 18 or older       Passed - No active pregnancy on record       Passed - Normal serum creatinine on file in past 12 months    Recent Labs   Lab Test  09/18/18   0930   CR  0.74            Passed - No positive pregnancy test in past 12 months        "

## 2018-10-10 RX ORDER — AMLODIPINE BESYLATE 5 MG/1
TABLET ORAL
Qty: 90 TABLET | Refills: 0 | OUTPATIENT
Start: 2018-10-10

## 2018-10-18 ENCOUNTER — MYC MEDICAL ADVICE (OUTPATIENT)
Dept: INTERNAL MEDICINE | Facility: CLINIC | Age: 53
End: 2018-10-18

## 2018-10-18 DIAGNOSIS — I25.10 CORONARY ARTERY DISEASE INVOLVING NATIVE CORONARY ARTERY OF NATIVE HEART WITHOUT ANGINA PECTORIS: Primary | ICD-10-CM

## 2018-10-21 DIAGNOSIS — I25.10 CORONARY ARTERY DISEASE INVOLVING NATIVE CORONARY ARTERY OF NATIVE HEART WITHOUT ANGINA PECTORIS: ICD-10-CM

## 2018-10-21 DIAGNOSIS — E03.9 ACQUIRED HYPOTHYROIDISM: ICD-10-CM

## 2018-10-22 RX ORDER — LEVOTHYROXINE SODIUM 25 UG/1
TABLET ORAL
Qty: 45 TABLET | Refills: 3 | Status: SHIPPED | OUTPATIENT
Start: 2018-10-22 | End: 2019-10-26

## 2018-10-22 RX ORDER — LISINOPRIL 5 MG/1
TABLET ORAL
Qty: 90 TABLET | Refills: 1 | Status: SHIPPED | OUTPATIENT
Start: 2018-10-22 | End: 2019-04-02

## 2018-10-22 RX ORDER — NITROGLYCERIN 0.4 MG/1
0.4 TABLET SUBLINGUAL EVERY 5 MIN PRN
Qty: 25 TABLET | Refills: 0 | Status: SHIPPED | OUTPATIENT
Start: 2018-10-22 | End: 2020-02-21

## 2018-10-22 NOTE — TELEPHONE ENCOUNTER
"Requested Prescriptions   Pending Prescriptions Disp Refills     levothyroxine (SYNTHROID/LEVOTHROID) 25 MCG tablet [Pharmacy Med Name: LEVOTHYROXINE 25 MCG TABLET] 45 tablet 3    Last Written Prescription Date:  10/30/2017  Last Fill Quantity: 45,  # refills: 3   Last office visit: 9/18/2018 with prescribing provider:     Future Office Visit:   Sig: TAKE 1 TABLET BY MOUTH EVERY OTHER DAY ALTERNATING WITH 50 MCG TABLETS    Thyroid Protocol Passed    10/21/2018  4:20 AM       Passed - Patient is 12 years or older       Passed - Recent (12 mo) or future (30 days) visit within the authorizing provider's specialty    Patient had office visit in the last 12 months or has a visit in the next 30 days with authorizing provider or within the authorizing provider's specialty.  See \"Patient Info\" tab in inbasket, or \"Choose Columns\" in Meds & Orders section of the refill encounter.             Passed - Normal TSH on file in past 12 months    Recent Labs   Lab Test  09/18/18   0930   TSH  2.13             Passed - No active pregnancy on record    If patient is pregnant or has had a positive pregnancy test, please check TSH.         Passed - No positive pregnancy test in past 12 months    If patient is pregnant or has had a positive pregnancy test, please check TSH.          lisinopril (PRINIVIL/ZESTRIL) 5 MG tablet [Pharmacy Med Name: LISINOPRIL 5 MG TABLET] 90 tablet 1    Last Written Prescription Date:  04/19/2018  Last Fill Quantity: 90,  # refills: 1   Last office visit: 9/18/2018 with prescribing provider:     Future Office Visit:   Sig: TAKE 1 TABLET BY MOUTH ONCE DAILY    ACE Inhibitors (Including Combos) Protocol Passed    10/21/2018  4:20 AM       Passed - Blood pressure under 140/90 in past 12 months    BP Readings from Last 3 Encounters:   10/05/18 120/70   09/18/18 122/72   04/09/18 108/71                Passed - Recent (12 mo) or future (30 days) visit within the authorizing provider's specialty    Patient had " "office visit in the last 12 months or has a visit in the next 30 days with authorizing provider or within the authorizing provider's specialty.  See \"Patient Info\" tab in inbasket, or \"Choose Columns\" in Meds & Orders section of the refill encounter.             Passed - Patient is age 18 or older       Passed - No active pregnancy on record       Passed - Normal serum creatinine on file in past 12 months    Recent Labs   Lab Test  09/18/18   0930   CR  0.74            Passed - Normal serum potassium on file in past 12 months    Recent Labs   Lab Test  09/18/18   0930   POTASSIUM  4.2            Passed - No positive pregnancy test in past 12 months        "

## 2018-10-22 NOTE — TELEPHONE ENCOUNTER
Requested Prescriptions   Pending Prescriptions Disp Refills     nitroGLYcerin (NITROSTAT) 0.4 MG sublingual tablet  Last Written Prescription Date:  No longer on med list  Last Fill Quantity: NA,  # refills: NA   Last office visit: 9/18/2018 with prescribing provider:  Yes   Future Office Visit:     25 tablet 0     Sig: Place 1 tablet (0.4 mg) under the tongue every 5 minutes as needed for chest pain    There is no refill protocol information for this order        Patient sent [employer] message requesting updated prescription sent to pharmacy. Pended medication order for review.    Routing refill request to provider for review/approval because:  Medication is reported/historical  No longer on patient's current med list

## 2018-10-26 DIAGNOSIS — I25.10 CORONARY ARTERY DISEASE INVOLVING NATIVE CORONARY ARTERY OF NATIVE HEART WITHOUT ANGINA PECTORIS: ICD-10-CM

## 2018-10-26 NOTE — TELEPHONE ENCOUNTER
"Requested Prescriptions   Pending Prescriptions Disp Refills     carvedilol (COREG) 6.25 MG tablet [Pharmacy Med Name: CARVEDILOL 6.25 MG TABLET]  Last Written Prescription Date:  8/4/2018  Last Fill Quantity: 180,  # refills: 0   Last office visit: 9/18/2018 with prescribing provider:     Future Office Visit:   180 tablet 0     Sig: TAKE 1 TABLET BY MOUTH TWICE A DAY WITH MEALS (DUE FOR APPT)    Beta-Blockers Protocol Passed    10/26/2018 12:41 PM       Passed - Blood pressure under 140/90 in past 12 months    BP Readings from Last 3 Encounters:   10/05/18 120/70   09/18/18 122/72   04/09/18 108/71                Passed - Patient is age 6 or older       Passed - Recent (12 mo) or future (30 days) visit within the authorizing provider's specialty    Patient had office visit in the last 12 months or has a visit in the next 30 days with authorizing provider or within the authorizing provider's specialty.  See \"Patient Info\" tab in inbasket, or \"Choose Columns\" in Meds & Orders section of the refill encounter.              "

## 2018-10-29 RX ORDER — CARVEDILOL 6.25 MG/1
6.25 TABLET ORAL 2 TIMES DAILY WITH MEALS
Qty: 180 TABLET | Refills: 2 | Status: SHIPPED | OUTPATIENT
Start: 2018-10-29 | End: 2019-07-30

## 2018-11-22 DIAGNOSIS — E03.9 ACQUIRED HYPOTHYROIDISM: ICD-10-CM

## 2018-11-23 NOTE — TELEPHONE ENCOUNTER
"Requested Prescriptions   Pending Prescriptions Disp Refills     levothyroxine (SYNTHROID/LEVOTHROID) 50 MCG tablet [Pharmacy Med Name: LEVOTHYROXINE 50 MCG TABLET] 90 tablet 0    Last Written Prescription Date:  10/30/2017  Last Fill Quantity: 45,  # refills: 3   Last office visit: 9/18/2018 with prescribing provider:     Future Office Visit:   Sig: TAKE 1 TABLET BY MOUTH EVERY DAY    Thyroid Protocol Passed    11/22/2018  2:16 AM       Passed - Patient is 12 years or older       Passed - Recent (12 mo) or future (30 days) visit within the authorizing provider's specialty    Patient had office visit in the last 12 months or has a visit in the next 30 days with authorizing provider or within the authorizing provider's specialty.  See \"Patient Info\" tab in inbasket, or \"Choose Columns\" in Meds & Orders section of the refill encounter.             Passed - Normal TSH on file in past 12 months    Recent Labs   Lab Test  09/18/18   0930   TSH  2.13             Passed - No active pregnancy on record    If patient is pregnant or has had a positive pregnancy test, please check TSH.         Passed - No positive pregnancy test in past 12 months    If patient is pregnant or has had a positive pregnancy test, please check TSH.          "

## 2018-11-27 RX ORDER — LEVOTHYROXINE SODIUM 50 UG/1
TABLET ORAL
Qty: 90 TABLET | Refills: 3 | Status: SHIPPED | OUTPATIENT
Start: 2018-11-27 | End: 2018-12-07

## 2018-12-07 ENCOUNTER — OFFICE VISIT (OUTPATIENT)
Dept: FAMILY MEDICINE | Facility: CLINIC | Age: 53
End: 2018-12-07
Payer: COMMERCIAL

## 2018-12-07 VITALS
WEIGHT: 148 LBS | RESPIRATION RATE: 16 BRPM | HEART RATE: 74 BPM | BODY MASS INDEX: 25.27 KG/M2 | HEIGHT: 64 IN | TEMPERATURE: 97.5 F | DIASTOLIC BLOOD PRESSURE: 78 MMHG | OXYGEN SATURATION: 98 % | SYSTOLIC BLOOD PRESSURE: 117 MMHG

## 2018-12-07 DIAGNOSIS — R05.9 COUGH: ICD-10-CM

## 2018-12-07 DIAGNOSIS — J01.00 ACUTE NON-RECURRENT MAXILLARY SINUSITIS: Primary | ICD-10-CM

## 2018-12-07 PROCEDURE — 99213 OFFICE O/P EST LOW 20 MIN: CPT | Performed by: PHYSICIAN ASSISTANT

## 2018-12-07 RX ORDER — FLUTICASONE PROPIONATE 50 MCG
1-2 SPRAY, SUSPENSION (ML) NASAL DAILY
Qty: 1 BOTTLE | Refills: 1 | Status: SHIPPED | OUTPATIENT
Start: 2018-12-07 | End: 2019-09-30

## 2018-12-07 RX ORDER — BENZONATATE 200 MG/1
200 CAPSULE ORAL 3 TIMES DAILY PRN
Qty: 21 CAPSULE | Refills: 0 | Status: SHIPPED | OUTPATIENT
Start: 2018-12-07 | End: 2019-09-26

## 2018-12-07 NOTE — MR AVS SNAPSHOT
"              After Visit Summary   12/7/2018    Fallon Pizano    MRN: [id number]           Patient Information     Date Of Birth          1965        Visit Information        Provider Department      12/7/2018 3:45 PM Katie Iyer PA-C [facility]        Today's Diagnoses     Acute non-recurrent maxillary sinusitis    -  1    Cough           Follow-ups after your visit        Follow-up notes from your care team     Return in about 6 months (around 6/7/2019) for Medication Check.      Who to contact     If you have questions or need follow up information about today's clinic visit or your schedule please contact [facility] directly at [phone].  Normal or non-critical lab and imaging results will be communicated to you by MyChart, letter or phone within 4 business days after the clinic has received the results. If you do not hear from us within 7 days, please contact the clinic through [employer] or phone. If you have a critical or abnormal lab result, we will notify you by phone as soon as possible.  Submit refill requests through [employer] or call your pharmacy and they will forward the refill request to us. Please allow 3 business days for your refill to be completed.          Additional Information About Your Visit        MyChart Information     [employer] gives you secure access to your electronic health record. If you see a primary care provider, you can also send messages to your care team and make appointments. If you have questions, please call your primary care clinic.  If you do not have a primary care provider, please call [phone] and they will assist you.        Care EveryWhere ID     This is your Care EveryWhere ID. This could be used by other organizations to access your [place] medical records  [id number]        Your Vitals Were     Pulse Temperature Respirations Height Last Period Pulse Oximetry    74 97.5  F (36.4  C) (Oral) 16 5' 4\" " (1.626 m) 07/18/2013 98%    BMI (Body Mass Index)                   25.4 kg/m2            Blood Pressure from Last 3 Encounters:   12/07/18 117/78   10/05/18 120/70   09/18/18 122/72    Weight from Last 3 Encounters:   12/07/18 148 lb (67.1 kg)   10/05/18 151 lb 3.2 oz (68.6 kg)   09/18/18 148 lb 12.8 oz (67.5 kg)              Today, you had the following     No orders found for display         Today's Medication Changes          These changes are accurate as of 12/7/18  4:44 PM.  If you have any questions, ask your nurse or doctor.               Start taking these medicines.        Dose/Directions    amoxicillin-clavulanate 875-125 MG tablet   Commonly known as:  AUGMENTIN   Used for:  Acute non-recurrent maxillary sinusitis   Started by:  Katie Iyer PA-C        Dose:  1 tablet   Take 1 tablet by mouth 2 times daily   Quantity:  20 tablet   Refills:  0       benzonatate 200 MG capsule   Commonly known as:  TESSALON   Used for:  Cough   Started by:  Katie Iyer PA-C        Dose:  200 mg   Take 1 capsule (200 mg) by mouth 3 times daily as needed for cough   Quantity:  21 capsule   Refills:  0            Where to get your medicines      These medications were sent to [facility]/pharmacy #[id number] - [place], [place] - [address] [facility]  [address] [facility] [address]    Hours:  [facility] Phone:  [phone]     amoxicillin-clavulanate 875-125 MG tablet    benzonatate 200 MG capsule    fluticasone 50 MCG/ACT nasal spray                Primary Care Provider Office Phone # Fax #    Gabrielal Carter [phone]       [address] [facility] [address]  [facility] [address]        Equal Access to Services     [facility] CenterNELIA AH: Jaqueline Maldonado, florida bond, thiago kaaldenae meyers, naina hunter. [facility] [phone].    ATENCIÓN: Si habla español, tiene a cheung disposición servicios gratuitos de asistencia lingüística. Llregulo dubose [phone].    We  comply with applicable federal civil rights laws and [place] laws. We do not discriminate on the basis of race, color, national origin, age, disability, sex, sexual orientation, or gender identity.            Thank you!     Thank you for choosing [facility]  for your care. Our goal is always to provide you with excellent care. Hearing back from our patients is one way we can continue to improve our services. Please take a few minutes to complete the written survey that you may receive in the mail after your visit with us. Thank you!             Your Updated Medication List - Protect others around you: Learn how to safely use, store and throw away your medicines at www.disposemymeds.org.          This list is accurate as of 12/7/18  4:44 PM.  Always use your most recent med list.                   Brand Name Dispense Instructions for use Diagnosis    amLODIPine 5 MG tablet    NORVASC    90 tablet    TAKE 1 TABLET BY MOUTH ONCE DAILY.    Other chest pain       amoxicillin-clavulanate 875-125 MG tablet    AUGMENTIN    20 tablet    Take 1 tablet by mouth 2 times daily    Acute non-recurrent maxillary sinusitis       aspirin 81 MG tablet    ASA    90 tablet    Take 1 tablet by mouth daily.        BENADRYL ALLERGY PO           benzonatate 200 MG capsule    TESSALON    21 capsule    Take 1 capsule (200 mg) by mouth 3 times daily as needed for cough    Cough       carvedilol 6.25 MG tablet    COREG    180 tablet    Take 1 tablet (6.25 mg) by mouth 2 times daily (with meals)    Coronary artery disease involving native coronary artery of native heart without angina pectoris       escitalopram 20 MG tablet    LEXAPRO    90 tablet    TAKE 1 TABLET BY MOUTH EVERY DAY    Mild episode of recurrent major depressive disorder (H)       fluticasone 50 MCG/ACT nasal spray    FLONASE    1 Bottle    Spray 1-2 sprays into both nostrils daily    Acute non-recurrent maxillary sinusitis       IBUPROFEN PO      Take 200  mg by mouth        * levothyroxine 50 MCG tablet    SYNTHROID/LEVOTHROID    45 tablet    1 po every other day alternating with 25 mcg    Acquired hypothyroidism       * levothyroxine 25 MCG tablet    SYNTHROID/LEVOTHROID    45 tablet    TAKE 1 TABLET BY MOUTH EVERY OTHER DAY ALTERNATING WITH 50 MCG TABLETS    Acquired hypothyroidism       lisinopril 5 MG tablet    PRINIVIL/ZESTRIL    90 tablet    TAKE 1 TABLET BY MOUTH ONCE DAILY    Coronary artery disease involving native coronary artery of native heart without angina pectoris       nitroGLYcerin 0.4 MG sublingual tablet    NITROSTAT    25 tablet    Place 1 tablet (0.4 mg) under the tongue every 5 minutes as needed for chest pain    Coronary artery disease involving native coronary artery of native heart without angina pectoris       rosuvastatin 20 MG tablet    CRESTOR    90 tablet    Take 1 tablet (20 mg) by mouth daily    Coronary artery disease involving native coronary artery of native heart without angina pectoris       traZODone 50 MG tablet    DESYREL    180 tablet    Take 2 tablets (100 mg) by mouth At Bedtime    Insomnia, unspecified type       zolpidem 5 MG tablet    AMBIEN    30 tablet    Take 1 tablet (5 mg) by mouth nightly as needed for sleep    Insomnia, unspecified type       * Notice:  This list has 2 medication(s) that are the same as other medications prescribed for you. Read the directions carefully, and ask your doctor or other care provider to review them with you.

## 2018-12-07 NOTE — PROGRESS NOTES
SUBJECTIVE:   Fallon Pizano is a 53 year old female who presents to clinic today for the following health issues:      Acute Illness   Acute illness concerns: URI   Onset: 1 week     Fever: no     Chills/Sweats: no     Headache (location?): YES    Sinus Pressure:YES    Conjunctivitis:  no    Ear Pain: YES: right    Rhinorrhea: YES    Congestion: YES    Sore Throat: no      Cough: YES-non-productive    Wheeze: no     Decreased Appetite: YES    Nausea: no     Vomiting: no    Diarrhea:  no    Dysuria/Freq.: no     Fatigue/Achiness: no     Sick/Strep Exposure: no      Therapies Tried and outcome:           Problem list and histories reviewed & adjusted, as indicated.  Additional history: as documented    Patient Active Problem List   Diagnosis     Hyperlipidemia LDL goal <70     Mild episode of recurrent major depressive disorder (H)     CAD (coronary artery disease)     Allergic rhinitis     Abnormal blood sugar     Acquired hypothyroidism     Past Surgical History:   Procedure Laterality Date     ANGIOGRAM  4/11    proximal LAD stent     COLONOSCOPY N/A 11/22/2017    Procedure: COLONOSCOPY;  Colonoscopy ;  Surgeon: Jaylon Botello MD;  Location: [facility]     HC ENLARGE BREAST WITH IMPLANT         Social History   Substance Use Topics     Smoking status: Light Tobacco Smoker     Types: Cigarettes     Last attempt to quit: 4/2/2011     Smokeless tobacco: Never Used      Comment: smokes occ     Alcohol use 0.0 oz/week     0 Standard drinks or equivalent per week      Comment: Casual     Family History   Problem Relation Age of Onset     Diabetes Mother      C.A.D. Mother      Heart Disease Mother      valve disease, pacer     Connective Tissue Disorder Mother      autoimmune disease     GASTROINTESTINAL DISEASE Mother      cirrhosis, non alcoholic     C.A.D. Father 55           Reviewed and updated as needed this visit by clinical staff       Reviewed and updated as needed this visit by Provider      "    ROS:  Constitutional, HEENT, cardiovascular, pulmonary, gi and gu systems are negative, except as otherwise noted.    OBJECTIVE:     /78 (BP Location: Right arm, Patient Position: Chair, Cuff Size: Adult Regular)  Pulse 74  Temp 97.5  F (36.4  C) (Oral)  Resp 16  Ht 5' 4\" (1.626 m)  Wt 148 lb (67.1 kg)  LMP 07/18/2013  SpO2 98%  BMI 25.4 kg/m2  Body mass index is 25.4 kg/(m^2).  GENERAL APPEARANCE: healthy, alert and no distress  HENT: ear canals and TM's normal, nasal mucosa edematous without rhinorrhea and maxillary sinus tenderness bilateral  RESP: lungs clear to auscultation - no rales, rhonchi or wheezes  CV: regular rates and rhythm, normal S1 S2, no S3 or S4 and no murmur, click or rub        ASSESSMENT/PLAN:             1. Acute non-recurrent maxillary sinusitis  Nasal saline spray  - fluticasone (FLONASE) 50 MCG/ACT nasal spray; Spray 1-2 sprays into both nostrils daily  Dispense: 1 Bottle; Refill: 1  - amoxicillin-clavulanate (AUGMENTIN) 875-125 MG tablet; Take 1 tablet by mouth 2 times daily  Dispense: 20 tablet; Refill: 0    2. Cough    - benzonatate (TESSALON) 200 MG capsule; Take 1 capsule (200 mg) by mouth 3 times daily as needed for cough  Dispense: 21 capsule; Refill: 0        Katie Iyer PA-C  [facility]   "

## 2019-02-14 ENCOUNTER — MYC MEDICAL ADVICE (OUTPATIENT)
Dept: INTERNAL MEDICINE | Facility: CLINIC | Age: 54
End: 2019-02-14

## 2019-02-16 NOTE — TELEPHONE ENCOUNTER
She can try to hold her statin and see if symptoms would improve. Also suggest to make a follow up appointment.

## 2019-02-18 ENCOUNTER — TELEPHONE (OUTPATIENT)
Dept: CARDIOLOGY | Facility: CLINIC | Age: 54
End: 2019-02-18

## 2019-02-18 NOTE — TELEPHONE ENCOUNTER
RN called patient and reviewed with her Dr. Hernandez's recommendations below. Patient is in agreement with plan to take 10mg of rosuvastatin once weekly. Patient will call prior to OV with Dr. Hernandez on 4/5/19 with any return of symptoms.           Please see if she'd try taking 10mg once per week and see if the aches are tolerable.

## 2019-02-18 NOTE — TELEPHONE ENCOUNTER
Patient called to report that approximately one month ago she began to notice shoulder pain, muscle aches, and headaches (similar to what she experienced while taking atorvastatin). Patient states she stopped taking rosuvastatin on her own this weekend and has already noticed an improvement with her symptoms. Patient scheduled OV with Dr. Hernandez on 4/5/19, but wondering if she should make any changes in the meantime. RN advised patient that RN would send to Dr. Hernandez for review and recommendation and call her back with with response. Patient verbalized understanding and is in agreement with plan.     10/5/18 last OV note  1. Coronary artery disease status post PCI of the LAD 2011, no recurrent symptoms    Intermittent angina well controlled on her amlodipine.  No change in her functional status.    We discussed that if she has decrease in her functional status, she should let us know and we can consider further ischemic evaluation.        2. Myalgias, possibly due to atorvastatin    Change to rosuvastatin.  Limit ibuprofen.        3. Hypertension, good control    Continue current medications.     Routine follow-up in 1 year if remains asymptomatic.

## 2019-02-24 DIAGNOSIS — F33.0 MILD EPISODE OF RECURRENT MAJOR DEPRESSIVE DISORDER (H): ICD-10-CM

## 2019-02-26 NOTE — TELEPHONE ENCOUNTER
"Requested Prescriptions   Pending Prescriptions Disp Refills     escitalopram (LEXAPRO) 20 MG tablet [Pharmacy Med Name: ESCITALOPRAM 20 MG TABLET]  Last Written Prescription Date:  12/4/2018  Last Fill Quantity: 90,  # refills: 0   Last office visit: 9/18/2018 with prescribing provider:     Future Office Visit:   Next 5 appointments (look out 90 days)    Apr 05, 2019  2:45 PM CDT  Return Visit with Babatunde Hernandez MD  [facility] ([facility]) [address] [address] [address] [phone]        90 tablet 0     Sig: TAKE 1 TABLET BY MOUTH EVERY DAY    SSRIs Protocol Failed - 2/24/2019 10:10 PM       Failed - PHQ-9 score less than 5 in past 6 months    Please review last PHQ-9 score.          Passed - Medication is active on med list       Passed - Patient is age 18 or older       Passed - No active pregnancy on record       Passed - No positive pregnancy test in last 12 months       Passed - Recent (6 mo) or future (30 days) visit within the authorizing provider's specialty    Patient had office visit in the last 6 months or has a visit in the next 30 days with authorizing provider or within the authorizing provider's specialty.  See \"Patient Info\" tab in inbasket, or \"Choose Columns\" in Meds & Orders section of the refill encounter.            "

## 2019-02-27 RX ORDER — ESCITALOPRAM OXALATE 20 MG/1
TABLET ORAL
Qty: 90 TABLET | Refills: 0 | Status: SHIPPED | OUTPATIENT
Start: 2019-02-27 | End: 2019-05-28

## 2019-02-28 NOTE — TELEPHONE ENCOUNTER
PHQ-9 score:    PHQ-9 SCORE 9/18/2018   PHQ-9 Total Score -   PHQ-9 Total Score MyChart 9 (Mild depression)   PHQ-9 Total Score 9       Medication is being filled for 1 time refill only due to:  pt due in March 2019 for f/u depression.     Mychart message sent.

## 2019-03-17 DIAGNOSIS — R07.89 OTHER CHEST PAIN: ICD-10-CM

## 2019-03-18 NOTE — TELEPHONE ENCOUNTER
"Requested Prescriptions   Pending Prescriptions Disp Refills     amLODIPine (NORVASC) 5 MG tablet [Pharmacy Med Name: AMLODIPINE BESYLATE 5 MG TAB] 90 tablet 1    Last Written Prescription Date:  10/03/2018  Last Fill Quantity: 90,  # refills: 1   Last office visit: 9/18/2018 with prescribing provider:     Future Office Visit:   Next 5 appointments (look out 90 days)    Apr 05, 2019  2:45 PM CDT  Return Visit with Babatunde Hernandez MD  [facility] ([facility]) [address] [facility] [address]  [facility] [id number] [phone]        Sig: TAKE 1 TABLET BY MOUTH EVERY DAY    Calcium Channel Blockers Protocol  Passed - 3/17/2019 12:33 AM       Passed - Blood pressure under 140/90 in past 12 months    BP Readings from Last 3 Encounters:   12/07/18 117/78   10/05/18 120/70   09/18/18 122/72                Passed - Recent (12 mo) or future (30 days) visit within the authorizing provider's specialty    Patient had office visit in the last 12 months or has a visit in the next 30 days with authorizing provider or within the authorizing provider's specialty.  See \"Patient Info\" tab in inbasket, or \"Choose Columns\" in Meds & Orders section of the refill encounter.             Passed - Medication is active on med list       Passed - Patient is age 18 or older       Passed - No active pregnancy on record       Passed - Normal serum creatinine on file in past 12 months    Recent Labs   Lab Test 09/18/18  0930   CR 0.74            Passed - No positive pregnancy test in past 12 months        "

## 2019-03-19 RX ORDER — AMLODIPINE BESYLATE 5 MG/1
TABLET ORAL
Qty: 90 TABLET | Refills: 1 | Status: SHIPPED | OUTPATIENT
Start: 2019-03-19 | End: 2019-09-21

## 2019-04-02 DIAGNOSIS — I25.10 CORONARY ARTERY DISEASE INVOLVING NATIVE CORONARY ARTERY OF NATIVE HEART WITHOUT ANGINA PECTORIS: ICD-10-CM

## 2019-04-03 RX ORDER — LISINOPRIL 5 MG/1
TABLET ORAL
Qty: 90 TABLET | Refills: 1 | Status: SHIPPED | OUTPATIENT
Start: 2019-04-03 | End: 2019-10-23

## 2019-04-03 NOTE — TELEPHONE ENCOUNTER
"Requested Prescriptions   Pending Prescriptions Disp Refills     lisinopril (PRINIVIL/ZESTRIL) 5 MG tablet [Pharmacy Med Name: LISINOPRIL 5 MG TABLET]  Last refill: 10/22/18   Last office visit: 9/18/18 90 tablet 1     Sig: TAKE 1 TABLET BY MOUTH EVERY DAY    ACE Inhibitors (Including Combos) Protocol Passed - 4/2/2019  1:15 AM       Passed - Blood pressure under 140/90 in past 12 months    BP Readings from Last 3 Encounters:   12/07/18 117/78   10/05/18 120/70   09/18/18 122/72                Passed - Recent (12 mo) or future (30 days) visit within the authorizing provider's specialty    Patient had office visit in the last 12 months or has a visit in the next 30 days with authorizing provider or within the authorizing provider's specialty.  See \"Patient Info\" tab in inbasket, or \"Choose Columns\" in Meds & Orders section of the refill encounter.             Passed - Medication is active on med list       Passed - Patient is age 18 or older       Passed - No active pregnancy on record       Passed - Normal serum creatinine on file in past 12 months    Recent Labs   Lab Test 09/18/18  0930   CR 0.74            Passed - Normal serum potassium on file in past 12 months    Recent Labs   Lab Test 09/18/18  0930   POTASSIUM 4.2            Passed - No positive pregnancy test within past 12 months          "

## 2019-04-05 ENCOUNTER — OFFICE VISIT (OUTPATIENT)
Dept: CARDIOLOGY | Facility: CLINIC | Age: 54
End: 2019-04-05
Payer: COMMERCIAL

## 2019-04-05 VITALS
WEIGHT: 151 LBS | HEIGHT: 64 IN | BODY MASS INDEX: 25.78 KG/M2 | HEART RATE: 68 BPM | DIASTOLIC BLOOD PRESSURE: 70 MMHG | SYSTOLIC BLOOD PRESSURE: 110 MMHG

## 2019-04-05 DIAGNOSIS — I25.10 CORONARY ARTERY DISEASE INVOLVING NATIVE CORONARY ARTERY OF NATIVE HEART WITHOUT ANGINA PECTORIS: ICD-10-CM

## 2019-04-05 DIAGNOSIS — R07.89 OTHER CHEST PAIN: ICD-10-CM

## 2019-04-05 PROCEDURE — 99213 OFFICE O/P EST LOW 20 MIN: CPT | Performed by: INTERNAL MEDICINE

## 2019-04-05 ASSESSMENT — MIFFLIN-ST. JEOR: SCORE: 1269.93

## 2019-04-05 NOTE — LETTER
4/5/2019    Gabriella Carter MD  [address] [facility] [address]  [facility] [address]    RE: Fallon Pizano       Dear Colleague,    I had the pleasure of seeing Fallon Pizano in the [facility].    Cardiology Progress Note          Assessment and Plan:     1. Coronary artery disease status post PCI of the LAD 2011    Continue medical management.      2. Hyperlipidemia, but myalgias to statins    Continue small dose of intermittent statin rosuvastatin 10 mg once per week.    Check fasting lipids in the next week or 2.    If severe hyperlipidemia or recurrent coronary artery events, would urge PCS K9 inhibition.    Routine follow-up in 1 year      This note was transcribed using electronic voice recognition software and there may be typographical errors present.                Interval History:   The patient is a very pleasant 53 year old whom I have been following for coronary artery disease status post PCI of the LAD 2011, she has had myalgias between the shoulder blades to statins.  Atorvastatin and rosuvastatin when taking daily have both caused these issues.  It was to the point where it was unbearable to her.  Now, she takes the rosuvastatin 10 mg once per week and it is tolerable.  She gets some shoulder discomfort for the first day or 2 but then it improves to the point where it is tolerable.  She denies exertional chest discomfort or dyspnea on exertion.                     Review of Systems:   Review of Systems:  Skin:  Negative     Eyes:  Positive for contacts;glasses  ENT:  Positive for    Respiratory:  Negative    Cardiovascular:  Negative    Gastroenterology: Negative    Genitourinary:  Negative    Musculoskeletal:  Positive for joint pain;neck pain  Neurologic:  Negative    Psychiatric:  Negative    Heme/Lymph/Imm:  Negative    Endocrine:  Positive for thyroid disorder              Physical Exam:     Vitals: /70 (BP Location: Right arm, Patient Position:  "Sitting, Cuff Size: Adult Regular)   Pulse 68   Ht 1.626 m (5' 4\")   Wt 68.5 kg (151 lb)   LMP 07/18/2013   Breastfeeding? No   BMI 25.92 kg/m     Constitutional:  cooperative, alert and oriented, well developed, well nourished, in no acute distress        Skin:  warm and dry to the touch;no apparent skin lesions or masses noted        Head:  normocephalic, no masses or lesions        Eyes:  pupils equal and round, conjunctivae and lids unremarkable, sclera white, no xanthalasma, EOMS intact, no nystagmus        ENT:  no pallor or cyanosis        Neck:  JVP normal        Chest:  normal breath sounds, clear to auscultation, normal A-P diameter, normal symmetry, normal respiratory excursion, no use of accessory muscles        Cardiac: regular rhythm;normal S1 and S2;no murmurs, gallops or rubs detected                  Abdomen:      benign    Extremities and Back:  no edema;no deformities, clubbing, cyanosis, erythema observed        Neurological:  affect appropriate;no gross motor deficits                 Medications:     Current Outpatient Medications   Medication Sig Dispense Refill     amLODIPine (NORVASC) 5 MG tablet TAKE 1 TABLET BY MOUTH EVERY DAY 90 tablet 1     amoxicillin-clavulanate (AUGMENTIN) 875-125 MG tablet Take 1 tablet by mouth 2 times daily 20 tablet 0     aspirin 81 MG tablet Take 1 tablet by mouth daily. 90 tablet 3     benzonatate (TESSALON) 200 MG capsule Take 1 capsule (200 mg) by mouth 3 times daily as needed for cough 21 capsule 0     carvedilol (COREG) 6.25 MG tablet Take 1 tablet (6.25 mg) by mouth 2 times daily (with meals) 180 tablet 2     DiphenhydrAMINE HCl (BENADRYL ALLERGY PO)        escitalopram (LEXAPRO) 20 MG tablet TAKE 1 TABLET BY MOUTH EVERY DAY 90 tablet 0     fluticasone (FLONASE) 50 MCG/ACT nasal spray Spray 1-2 sprays into both nostrils daily 1 Bottle 1     IBUPROFEN PO Take 200 mg by mouth       levothyroxine (SYNTHROID/LEVOTHROID) 25 MCG tablet TAKE 1 TABLET BY MOUTH " EVERY OTHER DAY ALTERNATING WITH 50 MCG TABLETS 45 tablet 3     levothyroxine (SYNTHROID/LEVOTHROID) 50 MCG tablet 1 po every other day alternating with 25 mcg 45 tablet 3     lisinopril (PRINIVIL/ZESTRIL) 5 MG tablet TAKE 1 TABLET BY MOUTH EVERY DAY 90 tablet 1     nitroGLYcerin (NITROSTAT) 0.4 MG sublingual tablet Place 1 tablet (0.4 mg) under the tongue every 5 minutes as needed for chest pain 25 tablet 0     rosuvastatin (CRESTOR) 20 MG tablet Take 1 tablet (20 mg) by mouth daily 90 tablet 3     traZODone (DESYREL) 50 MG tablet Take 2 tablets (100 mg) by mouth At Bedtime 180 tablet 1     zolpidem (AMBIEN) 5 MG tablet Take 1 tablet (5 mg) by mouth nightly as needed for sleep 30 tablet 1                Data:   All laboratory data reviewed  No results found for this or any previous visit (from the past 24 hour(s)).    All laboratory data reviewed  Lab Results   Component Value Date    CHOL 139 09/18/2018     Lab Results   Component Value Date    HDL 47 09/18/2018     Lab Results   Component Value Date    LDL 44 09/18/2018     Lab Results   Component Value Date    TRIG 238 09/18/2018     Lab Results   Component Value Date    CHOLHDLRATIO 3.0 06/24/2015     TSH   Date Value Ref Range Status   09/18/2018 2.13 0.40 - 4.00 mU/L Final     Last Basic Metabolic Panel:  Lab Results   Component Value Date     09/18/2018      Lab Results   Component Value Date    POTASSIUM 4.2 09/18/2018     Lab Results   Component Value Date    CHLORIDE 108 09/18/2018     Lab Results   Component Value Date    SHANTELL 8.9 09/18/2018     Lab Results   Component Value Date    CO2 24 09/18/2018     Lab Results   Component Value Date    BUN 10 09/18/2018     Lab Results   Component Value Date    CR 0.74 09/18/2018     Lab Results   Component Value Date     09/18/2018     Lab Results   Component Value Date    WBC 5.4 09/18/2018     Lab Results   Component Value Date    RBC 4.53 09/18/2018     Lab Results   Component Value Date    HGB 14.1  09/18/2018     Lab Results   Component Value Date    HCT 43.3 09/18/2018     Lab Results   Component Value Date    MCV 96 09/18/2018     Lab Results   Component Value Date    MCH 31.1 09/18/2018     Lab Results   Component Value Date    MCHC 32.6 09/18/2018     Lab Results   Component Value Date    RDW 13.9 09/18/2018     Lab Results   Component Value Date     09/18/2018         Thank you for allowing me to participate in the care of your patient.    Sincerely,     Babatunde Hernandez MD     [facility]

## 2019-04-05 NOTE — PROGRESS NOTES
"Cardiology Progress Note          Assessment and Plan:     1. Coronary artery disease status post PCI of the LAD 2011    Continue medical management.      2. Hyperlipidemia, but myalgias to statins    Continue small dose of intermittent statin rosuvastatin 10 mg once per week.    Check fasting lipids in the next week or 2.    If severe hyperlipidemia or recurrent coronary artery events, would urge PCS K9 inhibition.    Routine follow-up in 1 year      This note was transcribed using electronic voice recognition software and there may be typographical errors present.                Interval History:   The patient is a very pleasant 53 year old whom I have been following for coronary artery disease status post PCI of the LAD 2011, she has had myalgias between the shoulder blades to statins.  Atorvastatin and rosuvastatin when taking daily have both caused these issues.  It was to the point where it was unbearable to her.  Now, she takes the rosuvastatin 10 mg once per week and it is tolerable.  She gets some shoulder discomfort for the first day or 2 but then it improves to the point where it is tolerable.  She denies exertional chest discomfort or dyspnea on exertion.                     Review of Systems:   Review of Systems:  Skin:  Negative     Eyes:  Positive for contacts;glasses  ENT:  Positive for    Respiratory:  Negative    Cardiovascular:  Negative    Gastroenterology: Negative    Genitourinary:  Negative    Musculoskeletal:  Positive for joint pain;neck pain  Neurologic:  Negative    Psychiatric:  Negative    Heme/Lymph/Imm:  Negative    Endocrine:  Positive for thyroid disorder              Physical Exam:     Vitals: /70 (BP Location: Right arm, Patient Position: Sitting, Cuff Size: Adult Regular)   Pulse 68   Ht 1.626 m (5' 4\")   Wt 68.5 kg (151 lb)   LMP 07/18/2013   Breastfeeding? No   BMI 25.92 kg/m    Constitutional:  cooperative, alert and oriented, well developed, well nourished, in no " acute distress        Skin:  warm and dry to the touch;no apparent skin lesions or masses noted        Head:  normocephalic, no masses or lesions        Eyes:  pupils equal and round, conjunctivae and lids unremarkable, sclera white, no xanthalasma, EOMS intact, no nystagmus        ENT:  no pallor or cyanosis        Neck:  JVP normal        Chest:  normal breath sounds, clear to auscultation, normal A-P diameter, normal symmetry, normal respiratory excursion, no use of accessory muscles        Cardiac: regular rhythm;normal S1 and S2;no murmurs, gallops or rubs detected                  Abdomen:      benign    Extremities and Back:  no edema;no deformities, clubbing, cyanosis, erythema observed        Neurological:  affect appropriate;no gross motor deficits                 Medications:     Current Outpatient Medications   Medication Sig Dispense Refill     amLODIPine (NORVASC) 5 MG tablet TAKE 1 TABLET BY MOUTH EVERY DAY 90 tablet 1     amoxicillin-clavulanate (AUGMENTIN) 875-125 MG tablet Take 1 tablet by mouth 2 times daily 20 tablet 0     aspirin 81 MG tablet Take 1 tablet by mouth daily. 90 tablet 3     benzonatate (TESSALON) 200 MG capsule Take 1 capsule (200 mg) by mouth 3 times daily as needed for cough 21 capsule 0     carvedilol (COREG) 6.25 MG tablet Take 1 tablet (6.25 mg) by mouth 2 times daily (with meals) 180 tablet 2     DiphenhydrAMINE HCl (BENADRYL ALLERGY PO)        escitalopram (LEXAPRO) 20 MG tablet TAKE 1 TABLET BY MOUTH EVERY DAY 90 tablet 0     fluticasone (FLONASE) 50 MCG/ACT nasal spray Spray 1-2 sprays into both nostrils daily 1 Bottle 1     IBUPROFEN PO Take 200 mg by mouth       levothyroxine (SYNTHROID/LEVOTHROID) 25 MCG tablet TAKE 1 TABLET BY MOUTH EVERY OTHER DAY ALTERNATING WITH 50 MCG TABLETS 45 tablet 3     levothyroxine (SYNTHROID/LEVOTHROID) 50 MCG tablet 1 po every other day alternating with 25 mcg 45 tablet 3     lisinopril (PRINIVIL/ZESTRIL) 5 MG tablet TAKE 1 TABLET BY MOUTH  EVERY DAY 90 tablet 1     nitroGLYcerin (NITROSTAT) 0.4 MG sublingual tablet Place 1 tablet (0.4 mg) under the tongue every 5 minutes as needed for chest pain 25 tablet 0     rosuvastatin (CRESTOR) 20 MG tablet Take 1 tablet (20 mg) by mouth daily 90 tablet 3     traZODone (DESYREL) 50 MG tablet Take 2 tablets (100 mg) by mouth At Bedtime 180 tablet 1     zolpidem (AMBIEN) 5 MG tablet Take 1 tablet (5 mg) by mouth nightly as needed for sleep 30 tablet 1                Data:   All laboratory data reviewed  No results found for this or any previous visit (from the past 24 hour(s)).    All laboratory data reviewed  Lab Results   Component Value Date    CHOL 139 09/18/2018     Lab Results   Component Value Date    HDL 47 09/18/2018     Lab Results   Component Value Date    LDL 44 09/18/2018     Lab Results   Component Value Date    TRIG 238 09/18/2018     Lab Results   Component Value Date    CHOLHDLRATIO 3.0 06/24/2015     TSH   Date Value Ref Range Status   09/18/2018 2.13 0.40 - 4.00 mU/L Final     Last Basic Metabolic Panel:  Lab Results   Component Value Date     09/18/2018      Lab Results   Component Value Date    POTASSIUM 4.2 09/18/2018     Lab Results   Component Value Date    CHLORIDE 108 09/18/2018     Lab Results   Component Value Date    SHANTELL 8.9 09/18/2018     Lab Results   Component Value Date    CO2 24 09/18/2018     Lab Results   Component Value Date    BUN 10 09/18/2018     Lab Results   Component Value Date    CR 0.74 09/18/2018     Lab Results   Component Value Date     09/18/2018     Lab Results   Component Value Date    WBC 5.4 09/18/2018     Lab Results   Component Value Date    RBC 4.53 09/18/2018     Lab Results   Component Value Date    HGB 14.1 09/18/2018     Lab Results   Component Value Date    HCT 43.3 09/18/2018     Lab Results   Component Value Date    MCV 96 09/18/2018     Lab Results   Component Value Date    MCH 31.1 09/18/2018     Lab Results   Component Value Date     MCHC 32.6 09/18/2018     Lab Results   Component Value Date    RDW 13.9 09/18/2018     Lab Results   Component Value Date     09/18/2018

## 2019-04-09 DIAGNOSIS — I25.10 CORONARY ARTERY DISEASE INVOLVING NATIVE CORONARY ARTERY OF NATIVE HEART WITHOUT ANGINA PECTORIS: ICD-10-CM

## 2019-04-09 DIAGNOSIS — R07.89 OTHER CHEST PAIN: ICD-10-CM

## 2019-04-09 LAB
ALT SERPL W P-5'-P-CCNC: 30 U/L (ref 0–50)
CHOLEST SERPL-MCNC: 263 MG/DL
HDLC SERPL-MCNC: 50 MG/DL
LDLC SERPL CALC-MCNC: 153 MG/DL
NONHDLC SERPL-MCNC: 213 MG/DL
TRIGL SERPL-MCNC: 298 MG/DL

## 2019-04-09 PROCEDURE — 36415 COLL VENOUS BLD VENIPUNCTURE: CPT | Performed by: INTERNAL MEDICINE

## 2019-04-09 PROCEDURE — 84460 ALANINE AMINO (ALT) (SGPT): CPT | Performed by: INTERNAL MEDICINE

## 2019-04-09 PROCEDURE — 80061 LIPID PANEL: CPT | Performed by: INTERNAL MEDICINE

## 2019-04-25 ENCOUNTER — MYC MEDICAL ADVICE (OUTPATIENT)
Dept: CARDIOLOGY | Facility: CLINIC | Age: 54
End: 2019-04-25

## 2019-04-25 DIAGNOSIS — E78.2 MIXED HYPERLIPIDEMIA: Primary | ICD-10-CM

## 2019-04-25 RX ORDER — SIMVASTATIN 20 MG
20 TABLET ORAL AT BEDTIME
Qty: 90 TABLET | Refills: 3 | Status: SHIPPED | OUTPATIENT
Start: 2019-04-25 | End: 2019-09-12 | Stop reason: ALTCHOICE

## 2019-04-25 NOTE — TELEPHONE ENCOUNTER
Dr. Hernandez's response:    We can do the simvastatin (maybe 20mg), but it's a lot weaker than the atorvastatin and rosuvastatin.     As mentioned in the last clinic visit, we might have to consider PCSK9 in the future.    Routing comment        [employer] message sent to patient. Rx for simvastatin 20 mg daily sent to patient's preferred pharmacy.

## 2019-04-25 NOTE — TELEPHONE ENCOUNTER
Agustín message received from patient:    Dr. Hernandez, I tried to up my Statin to Sunday & Wednesday 1 full pill but it really does make me too sore on my arms, shoulder, neck & headache.  Is it possible to go back to Simastatin which I originally was on for years after heart attack.  I can't remember why we switch to Lipitor from that but I feel I can't handle the effects of Rouvastatin.   Please let me know your thoughts on this.  If this is do-able can you send prescription to [facility] & we can try that again.     Thanks,   Fallon Perez     Last OV 4/5/19 with Dr. Hernandez:    1. Coronary artery disease status post PCI of the LAD 2011    Continue medical management.        2. Hyperlipidemia, but myalgias to statins    Continue small dose of intermittent statin rosuvastatin 10 mg once per week.    Check fasting lipids in the next week or 2.    If severe hyperlipidemia or recurrent coronary artery events, would urge PCS K9 inhibition.     Routine follow-up in 1 year    Will route to Dr. Hernandez for review

## 2019-05-09 ENCOUNTER — OFFICE VISIT (OUTPATIENT)
Dept: INTERNAL MEDICINE | Facility: CLINIC | Age: 54
End: 2019-05-09
Payer: COMMERCIAL

## 2019-05-09 VITALS
RESPIRATION RATE: 16 BRPM | TEMPERATURE: 98.2 F | BODY MASS INDEX: 24.16 KG/M2 | HEIGHT: 64 IN | OXYGEN SATURATION: 100 % | WEIGHT: 141.5 LBS | HEART RATE: 96 BPM | DIASTOLIC BLOOD PRESSURE: 76 MMHG | SYSTOLIC BLOOD PRESSURE: 118 MMHG

## 2019-05-09 DIAGNOSIS — R42 DIZZINESS: ICD-10-CM

## 2019-05-09 DIAGNOSIS — J06.9 UPPER RESPIRATORY TRACT INFECTION, UNSPECIFIED TYPE: Primary | ICD-10-CM

## 2019-05-09 LAB
DEPRECATED S PYO AG THROAT QL EIA: NORMAL
FLUAV+FLUBV AG SPEC QL: NEGATIVE
FLUAV+FLUBV AG SPEC QL: NEGATIVE
SPECIMEN SOURCE: NORMAL
SPECIMEN SOURCE: NORMAL

## 2019-05-09 PROCEDURE — 87804 INFLUENZA ASSAY W/OPTIC: CPT | Performed by: INTERNAL MEDICINE

## 2019-05-09 PROCEDURE — 87880 STREP A ASSAY W/OPTIC: CPT | Performed by: INTERNAL MEDICINE

## 2019-05-09 PROCEDURE — 87081 CULTURE SCREEN ONLY: CPT | Performed by: INTERNAL MEDICINE

## 2019-05-09 PROCEDURE — 99214 OFFICE O/P EST MOD 30 MIN: CPT | Performed by: INTERNAL MEDICINE

## 2019-05-09 ASSESSMENT — MIFFLIN-ST. JEOR: SCORE: 1226.84

## 2019-05-09 NOTE — LETTER
[place] [address]  [facility] [address]  [phone]    5/9/2019     Fallon Bhandari Harinder   1965     To Whom it May Concern:    Ms. Thony Pizano was seen today for viral syndrome, disequilibrium. Excuse her from work from 5/6/19 through today.       Sincerely,           Gabriella Carter MD

## 2019-05-09 NOTE — NURSING NOTE
"Vital signs:  Temp: 98.2  F (36.8  C) Temp src: Oral BP: 118/76 Pulse: 96   Resp: 16 SpO2: 100 %     Height: 162.6 cm (5' 4\") Weight: 64.2 kg (141 lb 8 oz)  Estimated body mass index is 24.29 kg/m  as calculated from the following:    Height as of this encounter: 1.626 m (5' 4\").    Weight as of this encounter: 64.2 kg (141 lb 8 oz).          "

## 2019-05-09 NOTE — PROGRESS NOTES
SUBJECTIVE:   Fallon Pizano is a 53 year old female who presents to clinic today for the following   health issues:      URI: She reports that 5 days ago she started to feel significant malaise, nausea.  She started to have some cough that day.  She noticed her heart felt like it was going a little fast, 90s to 117.  The next day she developed a very sore throat and some worsening cough.  She started to develop rhinorrhea.  She started to have some bad chills associated on and off, did not take her temperature though.  She has not had chills now for about 24 hours, has been taking some over-the-counter multisymptom cold formula with some improvement.  Today though she feels off balance, a little dizzy.  This is present at rest, not really worse with movement.  She has not had vertigo.  She is a little scared because of her previous history of artery disease.  She has not had any visual disturbances, her ears are very plugged, her nose very stuffy.  The cough is better, the nose is actually a little better, the throat is a lot better.      Patient Active Problem List   Diagnosis     Hyperlipidemia LDL goal <70     Mild episode of recurrent major depressive disorder (H)     CAD (coronary artery disease)     Allergic rhinitis     Abnormal blood sugar     Acquired hypothyroidism     Current Outpatient Medications   Medication Sig Dispense Refill     amLODIPine (NORVASC) 5 MG tablet TAKE 1 TABLET BY MOUTH EVERY DAY 90 tablet 1     aspirin 81 MG tablet Take 1 tablet by mouth daily. 90 tablet 3     carvedilol (COREG) 6.25 MG tablet Take 1 tablet (6.25 mg) by mouth 2 times daily (with meals) 180 tablet 2     DiphenhydrAMINE HCl (BENADRYL ALLERGY PO)        escitalopram (LEXAPRO) 20 MG tablet TAKE 1 TABLET BY MOUTH EVERY DAY 90 tablet 0     fluticasone (FLONASE) 50 MCG/ACT nasal spray Spray 1-2 sprays into both nostrils daily 1 Bottle 1     IBUPROFEN PO Take 200 mg by mouth       levothyroxine  "(SYNTHROID/LEVOTHROID) 25 MCG tablet TAKE 1 TABLET BY MOUTH EVERY OTHER DAY ALTERNATING WITH 50 MCG TABLETS 45 tablet 3     levothyroxine (SYNTHROID/LEVOTHROID) 50 MCG tablet 1 po every other day alternating with 25 mcg 45 tablet 3     lisinopril (PRINIVIL/ZESTRIL) 5 MG tablet TAKE 1 TABLET BY MOUTH EVERY DAY 90 tablet 1     nitroGLYcerin (NITROSTAT) 0.4 MG sublingual tablet Place 1 tablet (0.4 mg) under the tongue every 5 minutes as needed for chest pain 25 tablet 0     simvastatin (ZOCOR) 20 MG tablet Take 1 tablet (20 mg) by mouth At Bedtime 90 tablet 3     traZODone (DESYREL) 50 MG tablet Take 2 tablets (100 mg) by mouth At Bedtime 180 tablet 1     zolpidem (AMBIEN) 5 MG tablet Take 1 tablet (5 mg) by mouth nightly as needed for sleep 30 tablet 1     benzonatate (TESSALON) 200 MG capsule Take 1 capsule (200 mg) by mouth 3 times daily as needed for cough (Patient not taking: Reported on 2019) 21 capsule 0      Social History     Tobacco Use     Smoking status: Light Tobacco Smoker     Types: Cigarettes     Last attempt to quit: 2011     Years since quittin.1     Smokeless tobacco: Never Used     Tobacco comment: smokes occ   Substance Use Topics     Alcohol use: Yes     Alcohol/week: 0.0 oz     Comment: Casual     Drug use: No        ROS:  Previous chills, sometimes felt warm without drenching sweats, she was having body aches that are resolving, nasal congestion, some minimal rhinorrhea now, minimal postnasal drainage now, no sinus pain, no ear pain, just plugged.  No enlarged lymph nodes.  No shortness of breath.  The fast heartbeat is resolved.  No other chest pain.  Still little body aches.  No vertigo    OBJECTIVE:     /76   Pulse 96   Temp 98.2  F (36.8  C) (Oral)   Resp 16   Ht 1.626 m (5' 4\")   Wt 64.2 kg (141 lb 8 oz)   LMP 2013   SpO2 100%   BMI 24.29 kg/m    Body mass index is 24.29 kg/m .    TM's are clear  Nasal mucosa with moderate edema, erythema. Mucus is not " present,   Sinuses are without tenderness  Posterior pharynx without erythema, without exudate  Anterior cervical nodes are not present   Lungs are clear, wheezes are not present with forced expiration.   Negative Hallpike    ASSESSMENT/PLAN:             1. Upper respiratory tract infection, unspecified type  Patient that her symptoms are consistent with a viral URI, her lungs are clear, oxygenation is good so no evidence of pneumonia.  Advised about avoiding decongestants with her history of heart issues, he is just Mucinex DM.  Can use Tylenol as needed for body aches and fever.  Advised symptoms should gradually resolve but follow-up as needed for any acute changes.  - Influenza A/B antigen  - Strep, Rapid Screen  - Beta strep group A culture    2. Dizziness  Advised the dizziness may be related to some inner ear irritation, intermittent middle ear plugging from the virus itself.  Reassured no evidence of any cardiac issues causing this.  She had vertigo in the past was worried about that, did advised that she could use over-the-counter body and if she were to develop any vertigo.  At this point she can try popping her ear is open on and off to see if that will help improve the symptoms.  Follow-up as needed.          Gabriella Carter MD  [facility]

## 2019-05-10 LAB
BACTERIA SPEC CULT: NORMAL
SPECIMEN SOURCE: NORMAL

## 2019-05-28 DIAGNOSIS — F33.0 MILD EPISODE OF RECURRENT MAJOR DEPRESSIVE DISORDER (H): ICD-10-CM

## 2019-05-28 RX ORDER — ESCITALOPRAM OXALATE 20 MG/1
TABLET ORAL
Qty: 90 TABLET | Refills: 0 | Status: SHIPPED | OUTPATIENT
Start: 2019-05-28 | End: 2019-08-25

## 2019-05-28 NOTE — TELEPHONE ENCOUNTER
"Requested Prescriptions   Pending Prescriptions Disp Refills     escitalopram (LEXAPRO) 20 MG tablet [Pharmacy Med Name: ESCITALOPRAM 20 MG TABLET]  Last office visit: 5/9/19 90 tablet 0     Sig: TAKE 1 TABLET BY MOUTH EVERY DAY (DUE FOR APPT)       SSRIs Protocol Failed - 5/28/2019  1:40 AM        Failed - PHQ-9 score less than 5 in past 6 months     Please review last PHQ-9 score.           Passed - Medication is active on med list        Passed - Patient is age 18 or older        Passed - No active pregnancy on record        Passed - No positive pregnancy test in last 12 months        Passed - Recent (6 mo) or future (30 days) visit within the authorizing provider's specialty     Patient had office visit in the last 6 months or has a visit in the next 30 days with authorizing provider or within the authorizing provider's specialty.  See \"Patient Info\" tab in inbasket, or \"Choose Columns\" in Meds & Orders section of the refill encounter.              "

## 2019-05-28 NOTE — TELEPHONE ENCOUNTER
Routing refill request to provider for review/approval because:  PHQ-9 outdated    PHQ-9 score:    PHQ-9 SCORE 9/18/2018   PHQ-9 Total Score -   PHQ-9 Total Score MyChart 9 (Mild depression)   PHQ-9 Total Score 9     Last office visit 5/9/19, patient was advised to follow up in 6 months

## 2019-07-30 DIAGNOSIS — I25.10 CORONARY ARTERY DISEASE INVOLVING NATIVE CORONARY ARTERY OF NATIVE HEART WITHOUT ANGINA PECTORIS: ICD-10-CM

## 2019-07-31 RX ORDER — CARVEDILOL 6.25 MG/1
TABLET ORAL
Qty: 180 TABLET | Refills: 2 | Status: SHIPPED | OUTPATIENT
Start: 2019-07-31 | End: 2020-04-24

## 2019-07-31 NOTE — TELEPHONE ENCOUNTER
"Requested Prescriptions   Pending Prescriptions Disp Refills     carvedilol (COREG) 6.25 MG tablet [Pharmacy Med Name: CARVEDILOL 6.25 MG TABLET] 180 tablet 2     Sig: TAKE 1 TABLET (6.25 MG) BY MOUTH 2 TIMES DAILY WITH MEALS   Last Written Prescription Date:  10/29/2018  Last Fill Quantity: 180,  # refills: 02   Last office visit: 5/9/2019 with prescribing provider:     Future Office Visit:      Beta-Blockers Protocol Passed - 7/30/2019  7:21 PM        Passed - Blood pressure under 140/90 in past 12 months     BP Readings from Last 3 Encounters:   05/09/19 118/76   04/05/19 110/70   12/07/18 117/78                 Passed - Patient is age 6 or older        Passed - Recent (12 mo) or future (30 days) visit within the authorizing provider's specialty     Patient had office visit in the last 12 months or has a visit in the next 30 days with authorizing provider or within the authorizing provider's specialty.  See \"Patient Info\" tab in inbasket, or \"Choose Columns\" in Meds & Orders section of the refill encounter.              Passed - Medication is active on med list        "

## 2019-08-25 DIAGNOSIS — F33.0 MILD EPISODE OF RECURRENT MAJOR DEPRESSIVE DISORDER (H): ICD-10-CM

## 2019-08-26 NOTE — TELEPHONE ENCOUNTER
"Requested Prescriptions   Pending Prescriptions Disp Refills     escitalopram (LEXAPRO) 20 MG tablet [Pharmacy Med Name: ESCITALOPRAM 20  Last Written Prescription Date:  5/28/2019  Last Fill Quantity: 90,  # refills: 0   Last office visit: 5/9/2019 with prescribing provider:     Future Office Visit:   MG TABLET] 90 tablet 0     Sig: TAKE 1 TABLET BY MOUTH EVERY DAY (DUE FOR APPT)       SSRIs Protocol Failed - 8/25/2019  8:36 PM        Failed - PHQ-9 score less than 5 in past 6 months     Please review last PHQ-9 score.           Passed - Medication is active on med list        Passed - Patient is age 18 or older        Passed - No active pregnancy on record        Passed - No positive pregnancy test in last 12 months        Passed - Recent (6 mo) or future (30 days) visit within the authorizing provider's specialty     Patient had office visit in the last 6 months or has a visit in the next 30 days with authorizing provider or within the authorizing provider's specialty.  See \"Patient Info\" tab in inbasket, or \"Choose Columns\" in Meds & Orders section of the refill encounter.            "

## 2019-08-27 RX ORDER — ESCITALOPRAM OXALATE 20 MG/1
TABLET ORAL
Qty: 90 TABLET | Refills: 0 | Status: SHIPPED | OUTPATIENT
Start: 2019-08-27 | End: 2019-11-25

## 2019-08-27 NOTE — TELEPHONE ENCOUNTER
Routing refill request to provider for review/approval because:  PHQ-9 score    PHQ-9 score:    PHQ-9 SCORE 9/18/2018   PHQ-9 Total Score -   PHQ-9 Total Score MyChart 9 (Mild depression)   PHQ-9 Total Score 9

## 2019-08-29 DIAGNOSIS — F33.0 MILD EPISODE OF RECURRENT MAJOR DEPRESSIVE DISORDER (H): ICD-10-CM

## 2019-08-29 NOTE — TELEPHONE ENCOUNTER
"Requested Prescriptions   Pending Prescriptions Disp Refills     escitalopram (LEXAPRO) 20 MG tablet [Pharmacy Med Name: ESCITALOPRAM 20  Last Written Prescription Date:  8/27/2019  Last Fill Quantity: 90,  # refills: 0   Last office visit: 5/9/2019 with prescribing provider:     Future Office Visit:   MG TABLET] 90 tablet 0     Sig: TAKE 1 TABLET BY MOUTH EVERY DAY (DUE FOR APPT)       SSRIs Protocol Failed - 8/29/2019 10:57 AM        Failed - PHQ-9 score less than 5 in past 6 months     Please review last PHQ-9 score.           Passed - Medication is active on med list        Passed - Patient is age 18 or older        Passed - No active pregnancy on record        Passed - No positive pregnancy test in last 12 months        Passed - Recent (6 mo) or future (30 days) visit within the authorizing provider's specialty     Patient had office visit in the last 6 months or has a visit in the next 30 days with authorizing provider or within the authorizing provider's specialty.  See \"Patient Info\" tab in inbasket, or \"Choose Columns\" in Meds & Orders section of the refill encounter.            "

## 2019-08-30 RX ORDER — ESCITALOPRAM OXALATE 20 MG/1
TABLET ORAL
Qty: 30 TABLET | Refills: 0 | Status: SHIPPED | OUTPATIENT
Start: 2019-08-30 | End: 2019-09-12

## 2019-08-30 NOTE — TELEPHONE ENCOUNTER
Last OV 5/9/19  Due in September for Physical. Filled x 1.     PHQ-9 SCORE 7/13/2017 10/12/2017 9/18/2018   PHQ-9 Total Score - - -   PHQ-9 Total Score MyChart - - 9 (Mild depression)   PHQ-9 Total Score 13 9 9

## 2019-09-12 ENCOUNTER — OFFICE VISIT (OUTPATIENT)
Dept: FAMILY MEDICINE | Facility: CLINIC | Age: 54
End: 2019-09-12
Payer: COMMERCIAL

## 2019-09-12 VITALS
SYSTOLIC BLOOD PRESSURE: 114 MMHG | BODY MASS INDEX: 24.2 KG/M2 | HEART RATE: 80 BPM | OXYGEN SATURATION: 97 % | DIASTOLIC BLOOD PRESSURE: 72 MMHG | WEIGHT: 141 LBS | TEMPERATURE: 98.4 F

## 2019-09-12 DIAGNOSIS — Z23 NEED FOR PROPHYLACTIC VACCINATION AND INOCULATION AGAINST INFLUENZA: Primary | ICD-10-CM

## 2019-09-12 DIAGNOSIS — M54.2 CERVICALGIA: ICD-10-CM

## 2019-09-12 DIAGNOSIS — F33.0 MILD EPISODE OF RECURRENT MAJOR DEPRESSIVE DISORDER (H): ICD-10-CM

## 2019-09-12 DIAGNOSIS — E78.5 HYPERLIPIDEMIA LDL GOAL <70: ICD-10-CM

## 2019-09-12 DIAGNOSIS — M79.10 MYALGIA: ICD-10-CM

## 2019-09-12 DIAGNOSIS — I25.10 CORONARY ARTERY DISEASE INVOLVING NATIVE CORONARY ARTERY OF NATIVE HEART WITHOUT ANGINA PECTORIS: ICD-10-CM

## 2019-09-12 LAB — ERYTHROCYTE [SEDIMENTATION RATE] IN BLOOD BY WESTERGREN METHOD: 10 MM/H (ref 0–30)

## 2019-09-12 PROCEDURE — 82550 ASSAY OF CK (CPK): CPT | Performed by: FAMILY MEDICINE

## 2019-09-12 PROCEDURE — 36415 COLL VENOUS BLD VENIPUNCTURE: CPT | Performed by: FAMILY MEDICINE

## 2019-09-12 PROCEDURE — 85652 RBC SED RATE AUTOMATED: CPT | Performed by: FAMILY MEDICINE

## 2019-09-12 PROCEDURE — 90471 IMMUNIZATION ADMIN: CPT | Performed by: FAMILY MEDICINE

## 2019-09-12 PROCEDURE — 99214 OFFICE O/P EST MOD 30 MIN: CPT | Mod: 25 | Performed by: FAMILY MEDICINE

## 2019-09-12 PROCEDURE — 90682 RIV4 VACC RECOMBINANT DNA IM: CPT | Performed by: FAMILY MEDICINE

## 2019-09-12 RX ORDER — DESVENLAFAXINE 50 MG/1
50 TABLET, FILM COATED, EXTENDED RELEASE ORAL DAILY
Qty: 30 TABLET | Refills: 1 | Status: SHIPPED | OUTPATIENT
Start: 2019-09-12 | End: 2019-10-09

## 2019-09-12 RX ORDER — ROSUVASTATIN CALCIUM 40 MG/1
40 TABLET, COATED ORAL DAILY
Qty: 30 TABLET | Refills: 2 | Status: SHIPPED | OUTPATIENT
Start: 2019-09-12 | End: 2019-11-03

## 2019-09-12 RX ORDER — ESCITALOPRAM OXALATE 20 MG/1
TABLET ORAL
Qty: 30 TABLET | Refills: 0 | Status: SHIPPED | OUTPATIENT
Start: 2019-09-12 | End: 2020-03-09

## 2019-09-12 NOTE — PROGRESS NOTES
Subjective     Fallon Pizano is a 54 year old female who presents to clinic today for the following health issues:    HPI   Muscle Pain    Onset: 2 years - Attributed to  statins    Description:   Location: left shoulder, right shoulder and neck, down middle of back  Character: dull    Intensity: 8/10    Progression of Symptoms: constant, worse    Accompanying Signs & Symptoms:  Other symptoms: fatigue, headache    History:   Previous similar pain: no       Precipitating factors:   Trauma or overuse: no     Alleviating factors:  Improved by: nothing and Ibuprofen tried    Therapies Tried and outcome: hot shower makes it worse     Past Medical History:   Diagnosis Date     Acquired hypothyroidism 2017     Allergic rhinitis      CAD (coronary artery disease)     AMI, LAD stent     Cervicalgia 2019     Chest pain      Hyperlipidemia LDL goal <70 9/10/2012     Major depression     related to CAD     MI (myocardial infarction) (H) 2011     Mild episode of recurrent major depressive disorder (H) 9/10/2012     Myalgia 2019     Palpitations        Past Surgical History:   Procedure Laterality Date     ANGIOGRAM      proximal LAD stent     COLONOSCOPY N/A 2017    Procedure: COLONOSCOPY;  Colonoscopy ;  Surgeon: Jaylon Botello MD;  Location: [facility]     HC ENLARGE BREAST WITH IMPLANT         Family History   Problem Relation Age of Onset     Diabetes Mother      C.A.D. Mother      Heart Disease Mother         valve disease, pacer     Connective Tissue Disorder Mother         autoimmune disease     Gastrointestinal Disease Mother         cirrhosis, non alcoholic     C.A.D. Father 55       Social History     Tobacco Use     Smoking status: Light Tobacco Smoker     Types: Cigarettes     Last attempt to quit: 2011     Years since quittin.4     Smokeless tobacco: Never Used     Tobacco comment: smokes occ   Substance Use Topics     Alcohol use: Yes     Alcohol/week: 0.0 oz      Comment: Casual               Reviewed and updated as needed this visit by Provider  Tobacco  Allergies  Meds  Problems  Med Hx  Surg Hx  Fam Hx         Review of Systems   No CP, rash. Other myalgia, synovitis      Objective    /72 (BP Location: Right arm, Patient Position: Sitting, Cuff Size: Adult Regular)   Pulse 80   Temp 98.4  F (36.9  C) (Oral)   Wt 64 kg (141 lb)   LMP 07/18/2013   SpO2 97%   BMI 24.20 kg/m    Body mass index is 24.2 kg/m .  Physical Exam   Pain reproduced by trapezius palpation    No neurotension signs        Assessment & Plan   Problem List Items Addressed This Visit        Nervous and Auditory    Cervicalgia     Involves trapezius         Relevant Medications    desvenlafaxine (PRISTIQ) 50 MG 24 hr tablet    Other Relevant Orders    [facility]    Myalgia     Blamed on statins. CPK ESR 9/2018 nl, occurs with many         Relevant Orders    ESR: Erythrocyte sedimentation rate (Completed)    CK total (Completed)       Endocrine    Hyperlipidemia LDL goal <70     Stop simvastatin, trial crestor at appropriate dose 2 weeks after         Relevant Medications    rosuvastatin (CRESTOR) 40 MG tablet       Circulatory    CAD (coronary artery disease)     Needs statin, Hi dose preferable         Relevant Medications    rosuvastatin (CRESTOR) 40 MG tablet       Behavioral    Mild episode of recurrent major depressive disorder (H)     Lexapro, last PHQ-9=9. Start Pristiq, consider stopping lexapro in future         Relevant Medications    escitalopram (LEXAPRO) 20 MG tablet    desvenlafaxine (PRISTIQ) 50 MG 24 hr tablet      Other Visit Diagnoses     Need for prophylactic vaccination and inoculation against influenza    -  Primary    Relevant Orders    INFLUENZA QUAD, RECOMBINANT, P-FREE (RIV4) (FLUBLOCK) [97765] (Completed)    Vaccine Administration, Initial [00699] (Completed)             Tobacco Cessation:   reports that she has been smoking  cigarettes.  She has never used smokeless tobacco.  Tobacco Cessation Action Plan: not addressed today        Has PCP appt  Return in about 3 weeks (around 10/3/2019), or PCP.    Champ Sam MD  [facility]

## 2019-09-13 ENCOUNTER — THERAPY VISIT (OUTPATIENT)
Dept: PHYSICAL THERAPY | Facility: CLINIC | Age: 54
End: 2019-09-13
Payer: COMMERCIAL

## 2019-09-13 DIAGNOSIS — M54.12 CERVICAL RADICULOPATHY: ICD-10-CM

## 2019-09-13 DIAGNOSIS — M54.2 CERVICALGIA: ICD-10-CM

## 2019-09-13 LAB — CK SERPL-CCNC: 73 U/L (ref 30–225)

## 2019-09-13 PROCEDURE — 97110 THERAPEUTIC EXERCISES: CPT | Mod: GP | Performed by: PHYSICAL THERAPIST

## 2019-09-13 PROCEDURE — 97530 THERAPEUTIC ACTIVITIES: CPT | Mod: GP | Performed by: PHYSICAL THERAPIST

## 2019-09-13 PROCEDURE — 97162 PT EVAL MOD COMPLEX 30 MIN: CPT | Mod: GP | Performed by: PHYSICAL THERAPIST

## 2019-09-13 NOTE — PROGRESS NOTES
"[facility] Initial Evaluation -- Cervical    Evaluation Date: September 13, 2019  Fallon Pizano is a 54 year old female with a cervicalgia condition.   Referral: Dr. Sam  Work mechanical stresses: computer work; filing   Employment status: administration  Leisure mechanical stresses: reading  Functional disability score (NDI):  See flowsheet  VAS score (0-10): 9/10  Patient goals/expectations:  \"to get rid of pain\"    HISTORY:    Present symptoms:  Headahces, neck pain that radiates into L arm.  Pain quality (sharp/shooting/stabbing/aching/burning/cramping):   Dull;aching.  Paresthesia (yes/no):      Present since (onset date): Sept 2017.     Symptoms (improving/unchanging/worsening):  worsening.    Symptoms commenced as a result of:    Condition occurred in the following environment:      Symptoms at onset (neck/arm/forearm/headache): shoulders  Constant symptoms (neck/arm/forearm/headache): neck pain  Intermittent symptoms (neck/arm/forearm/headache): headaches    Symptoms are made worse with the following: Always Sitting, Always Turning, Always Lying and Always When still   Symptoms are made better with the following: standing; extension    Disturbed sleep (yes/no): yes  Number of pillows: 1  Sleeping postures (prone/sup/side R/L): sides    Previous episodes (0/1-5/6-10/11+):  Year of first episode:     Previous history:   Previous treatments:     Specific Questions: (as reported by the patient)  Dizziness/Tinnitus/Nausea/Swallowing (pos/neg): neg  Gait/Upper Limbs (normal/abnormal): abnormal; one leg feels heavier than the other  Medications (nil/NSAIDS/anlag/steroids/anticoag/other):  Other - Thyroid and Anti-depressants  Medical allergies:  no  General health (excellent/good/fair/poor):  good  Pertinent medical history:  Depression, Heart problems, Implanted device, Migraines/Headaches, Sleep disorder/apnea, Smoking, Thyroid problems, Chest pain, significant weakness and " severe headaches  Imaging (None/Xray/MRI/Other):  no  Recent or major surgery (yes/no): no  Night pain (yes/no): no  Accidents (yes/no): no  Unexplained weight loss (yes/no): no  Barriers at home: no  Other red flags: no    EXAMINATION    Posture:   Sitting (good/fair/poor): fair  Standing (good/fair/poor):      Protruded head (yes/no): no    Wry Neck (right/left/nil):  Yes R Relevant (yes/no):  yes     Correction of posture(better/worse/no effect): better  Other observations:      Neurological:    Motor Deficit:     Reflexes:  + bilat ULNTT  Sensory Deficit:     Dural signs:      Movement Loss:   Skip Mod Min Nil Pain   Protrusion        Flexion  x x     Retraction  x   erp   Extension  x   erp   Lateral flexion R  x      Lateral flexion L   x     Rotation R   x     Rotation L  x x       Test Movements:   During: produces, abolishes, increases, decreases, no effect, centralizing, peripheralizing  After: better, worse, no better, no worse, no effect, centralized, peripheralized    Pretest symptoms sitting: Headache and bilat shoulder pain L>R   Symptoms During Symptoms After ROM increased ROM decreased No Effect   PRO        Rep PRO        RET Produces    No worse         Rep RET Produces    Worse      x   RET EXT        Rep RET EXT        Pretest symptoms lying:     Symptoms During Symptoms After ROM increased ROM decreased No Effect   RET Produces    No Worse         Rep RET Produces    Worse      x   RET EXT        Rep RET EXT        If required, pretest symptoms sitting:  Headache & bilat shoulder pain    Symptoms During Symptoms After ROM increased ROM decreased No Effect   LF-R        Rep LF-R        LF-L    LF-L Supine Increases; peripheralises   decr HA  Worse    Better      Rep LF-L    Rep LF-Supine Increases; peripheralises  Increase L neck   Worse    Not Worse     X, better L ULNTT  x   ROT-R        Rep ROT-R        ROT-L        Rep ROT-L        FLEX        Rep FLEX            Static Tests:   Protrusion:     Flexion:    Retraction:    Extension (sitting/prone/supine):      Other Tests:     Provisional Classification:  Derangement - Asymmetrical, unilateral, symptoms below elbow, with relevant lateral component    Principle of Management:  Education:  Therapeutic dose of exercise, traffic light, cetnralization    Equipment provided:  Lumbar roll  Mechanical therapy (Y/N):  Y   Extension principle:     Lateral principle:  Rep L SB in supine 1x10, every 2 hours  Flexion principle:     Other:      ASSESSMENT/PLAN:    Patient is a 54 year old female with cervical complaints.    Patient has the following significant findings with corresponding treatment plan.                Diagnosis 1:  Cervical Radiculopathy  Pain -  manual therapy, self management, education, directional preference exercise and home program  Decreased ROM/flexibility - manual therapy and therapeutic exercise  Decreased function - therapeutic activities  Impaired posture - neuro re-education    Therapy Evaluation Codes:   1) History comprised of:   Personal factors that impact the plan of care:      Coping style.    Comorbidity factors that impact the plan of care are:      Depression, Heart problems, Implanted device, Migraines/headaches, Sleep disorder/apnea, Smoking and thyroid problems, chest pain, pain at night/rest, severe headaches, and significant weakness.     Medications impacting care: Anti-depressant and thyroid.  2) Examination of Body Systems comprised of:   Body structures and functions that impact the plan of care:      Cervical spine.   Activity limitations that impact the plan of care are:      Bending, Reading/Computer work, Sitting and turning.  3) Clinical presentation characteristics are:   Evolving/Changing.  4) Decision-Making    Moderate complexity using standardized patient assessment instrument and/or measureable assessment of functional outcome.  Cumulative Therapy Evaluation is: Moderate complexity.    Previous and current  functional limitations:  (See Goal Flow Sheet for this information)    Short term and Long term goals: (See Goal Flow Sheet for this information)     Communication ability:  Patient appears to be able to clearly communicate and understand verbal and written communication and follow directions correctly.  Treatment Explanation - The following has been discussed with the patient:   RX ordered/plan of care  Anticipated outcomes  Possible risks and side effects  This patient would benefit from PT intervention to resume normal activities.   Rehab potential is good.    Frequency:  1 X week, once daily  Duration:  for 6 weeks  Discharge Plan:  Achieve all LTG.  Independent in home treatment program.  Reach maximal therapeutic benefit.    Please refer to the daily flowsheet for treatment today, total treatment time and time spent performing 1:1 timed codes.

## 2019-09-21 DIAGNOSIS — R07.89 OTHER CHEST PAIN: ICD-10-CM

## 2019-09-23 NOTE — TELEPHONE ENCOUNTER
"Requested Prescriptions   Pending Prescriptions Disp Refills     amLODIPine (NORVASC) 5 MG tablet [Pharmacy Med Name: AMLODIPINE BESYLATE 5 MG TAB] 90 tablet 1     Sig: TAKE 1 TABLET BY MOUTH EVERY DAY   Last Written Prescription Date:  03/19/2019  Last Fill Quantity: 90,  # refills: 01   Last office visit: 5/9/2019 with prescribing provider:     Future Office Visit:   Next 5 appointments (look out 90 days)    Sep 26, 2019  8:00 AM CDT  SHORT with Gabriella Carter MD  [facility] ([facility]) [address]  [facility] [address]  [phone]           Calcium Channel Blockers Protocol  Failed - 9/21/2019 12:48 AM        Failed - Normal serum creatinine on file in past 12 months     Recent Labs   Lab Test 09/18/18  0930   CR 0.74             Passed - Blood pressure under 140/90 in past 12 months     BP Readings from Last 3 Encounters:   09/12/19 114/72   05/09/19 118/76   04/05/19 110/70                 Passed - Recent (12 mo) or future (30 days) visit within the authorizing provider's specialty     Patient had office visit in the last 12 months or has a visit in the next 30 days with authorizing provider or within the authorizing provider's specialty.  See \"Patient Info\" tab in inbasket, or \"Choose Columns\" in Meds & Orders section of the refill encounter.              Passed - Medication is active on med list        Passed - Patient is age 18 or older        Passed - No active pregnancy on record        Passed - No positive pregnancy test in past 12 months        "

## 2019-09-24 ENCOUNTER — THERAPY VISIT (OUTPATIENT)
Dept: PHYSICAL THERAPY | Facility: CLINIC | Age: 54
End: 2019-09-24
Payer: COMMERCIAL

## 2019-09-24 DIAGNOSIS — M54.12 CERVICAL RADICULOPATHY: ICD-10-CM

## 2019-09-24 PROCEDURE — 97110 THERAPEUTIC EXERCISES: CPT | Mod: GP | Performed by: PHYSICAL THERAPIST

## 2019-09-24 PROCEDURE — 97530 THERAPEUTIC ACTIVITIES: CPT | Mod: GP | Performed by: PHYSICAL THERAPIST

## 2019-09-24 RX ORDER — AMLODIPINE BESYLATE 5 MG/1
TABLET ORAL
Qty: 90 TABLET | Refills: 0 | Status: SHIPPED | OUTPATIENT
Start: 2019-09-24 | End: 2019-12-11

## 2019-09-24 NOTE — TELEPHONE ENCOUNTER
Patient has appt scheduled for 9/26/19, Prescription approved per [facility]. CECILE Armenta R.N.

## 2019-09-26 ENCOUNTER — OFFICE VISIT (OUTPATIENT)
Dept: INTERNAL MEDICINE | Facility: CLINIC | Age: 54
End: 2019-09-26
Payer: COMMERCIAL

## 2019-09-26 VITALS
SYSTOLIC BLOOD PRESSURE: 118 MMHG | TEMPERATURE: 98.9 F | DIASTOLIC BLOOD PRESSURE: 70 MMHG | OXYGEN SATURATION: 98 % | HEIGHT: 63 IN | RESPIRATION RATE: 16 BRPM | BODY MASS INDEX: 24.98 KG/M2 | WEIGHT: 141 LBS | HEART RATE: 83 BPM

## 2019-09-26 DIAGNOSIS — R73.09 ABNORMAL BLOOD SUGAR: ICD-10-CM

## 2019-09-26 DIAGNOSIS — E03.9 ACQUIRED HYPOTHYROIDISM: ICD-10-CM

## 2019-09-26 DIAGNOSIS — E78.5 HYPERLIPIDEMIA LDL GOAL <70: ICD-10-CM

## 2019-09-26 DIAGNOSIS — M54.50 RIGHT-SIDED LOW BACK PAIN WITHOUT SCIATICA, UNSPECIFIED CHRONICITY: ICD-10-CM

## 2019-09-26 DIAGNOSIS — F33.0 MILD EPISODE OF RECURRENT MAJOR DEPRESSIVE DISORDER (H): ICD-10-CM

## 2019-09-26 DIAGNOSIS — M54.12 CERVICAL RADICULOPATHY: Primary | ICD-10-CM

## 2019-09-26 PROCEDURE — 99214 OFFICE O/P EST MOD 30 MIN: CPT | Performed by: INTERNAL MEDICINE

## 2019-09-26 ASSESSMENT — MIFFLIN-ST. JEOR: SCORE: 1203.7

## 2019-09-26 ASSESSMENT — PATIENT HEALTH QUESTIONNAIRE - PHQ9: SUM OF ALL RESPONSES TO PHQ QUESTIONS 1-9: 12

## 2019-09-26 NOTE — PATIENT INSTRUCTIONS
Take lexapro one tablet one day, then 1/2 tablet the next, do this 2 times (4 days), then take 1/2 tablet every day for 4 days, then 1/2 tablet every other day 2 times (4 days), then stop.     Send me an update on the muscle symptoms after 2-3 weeks.     Try CoQ10 supplement to help muscle pain.

## 2019-09-26 NOTE — PROGRESS NOTES
Subjective     Fallon Pizano is a 54 year old female who presents to clinic today for the following health issues:    HPI   She is here today for follow-up of neck, upper back, arm pain and low back pain.  She was seen at another clinic 2 weeks ago the symptoms which she reports is been going on for up to 2 years.  She was referred for physical therapy and had her statin held.  She also was started on a different antidepressant however her Lexapro was not weaned off.    Her primary complaint is neck pain: She has had pain at the spine of the entire neck for about 2 years.  This has gradually gotten worse so she has constant soreness, aching without sharp pain at the neck.  She does have some pain with movements.  Over the past 9 months she has had more pain in the trapezius areas, upper back and into the arms.  This has been constant also, very sore, and had been worsening recently so that the right associated with numbness which is not a tingling but more of a decrease in sensation.  The arm had been the most bothersome thing when she went in.  She has had 2 sessions of physical therapy and reports that her left arm feels better but not much change in the right neck in the right arm yet.  She does not have weakness in the arms.    She has had some right low back pain involving the right lumbar region, right buttock with rare pain into the leg.  This does radiate, to lean to the side.  Feels that this may throw off her gait a little. The back is very stiff.    She is not sure yet if stopping the statin has had any influence on any of her pain.  She was given Crestor 40 mg but has not started it yet.    She had been feeling more depressed, does feel that she seems less irritable since starting the new medication but she would prefer to be on only one medication.           Patient Active Problem List   Diagnosis     Hyperlipidemia LDL goal <70     Mild episode of recurrent major depressive disorder (H)      CAD (coronary artery disease)     Allergic rhinitis     Abnormal blood sugar     Acquired hypothyroidism     Cervical radiculopathy     Myalgia     Current Outpatient Medications   Medication Sig Dispense Refill     amLODIPine (NORVASC) 5 MG tablet TAKE 1 TABLET BY MOUTH EVERY DAY 90 tablet 0     aspirin 81 MG tablet Take 1 tablet by mouth daily. 90 tablet 3     carvedilol (COREG) 6.25 MG tablet TAKE 1 TABLET (6.25 MG) BY MOUTH 2 TIMES DAILY WITH MEALS 180 tablet 2     desvenlafaxine (PRISTIQ) 50 MG 24 hr tablet Take 1 tablet (50 mg) by mouth daily 30 tablet 1     DiphenhydrAMINE HCl (BENADRYL ALLERGY PO)        escitalopram (LEXAPRO) 20 MG tablet TAKE 1 TABLET BY MOUTH EVERY DAY (DUE FOR APPT) 30 tablet 0     fluticasone (FLONASE) 50 MCG/ACT nasal spray Spray 1-2 sprays into both nostrils daily 1 Bottle 1     IBUPROFEN PO Take 200 mg by mouth       levothyroxine (SYNTHROID/LEVOTHROID) 25 MCG tablet TAKE 1 TABLET BY MOUTH EVERY OTHER DAY ALTERNATING WITH 50 MCG TABLETS 45 tablet 3     levothyroxine (SYNTHROID/LEVOTHROID) 50 MCG tablet 1 po every other day alternating with 25 mcg 45 tablet 3     lisinopril (PRINIVIL/ZESTRIL) 5 MG tablet TAKE 1 TABLET BY MOUTH EVERY DAY 90 tablet 1     nitroGLYcerin (NITROSTAT) 0.4 MG sublingual tablet Place 1 tablet (0.4 mg) under the tongue every 5 minutes as needed for chest pain 25 tablet 0     rosuvastatin (CRESTOR) 40 MG tablet Take 1 tablet (40 mg) by mouth daily 30 tablet 2     traZODone (DESYREL) 50 MG tablet Take 2 tablets (100 mg) by mouth At Bedtime 180 tablet 1     zolpidem (AMBIEN) 5 MG tablet Take 1 tablet (5 mg) by mouth nightly as needed for sleep 30 tablet 1     escitalopram (LEXAPRO) 20 MG tablet TAKE 1 TABLET BY MOUTH EVERY DAY (DUE FOR APPT) 90 tablet 0          Reviewed and updated as needed this visit by Provider         Review of Systems   No fever, chills, numbness, tingling or weakness in the legs, no weakness in the arms, she has had some headaches but that  "actually is better with the therapy,      Objective    /70   Pulse 83   Temp 98.9  F (37.2  C) (Oral)   Resp 16   Ht 1.6 m (5' 3\")   Wt 64 kg (141 lb)   LMP 07/18/2013   SpO2 98%   BMI 24.98 kg/m    Body mass index is 24.98 kg/m .  Physical Exam     Neck: Fairly preserved range of motion but some pain with extension, lateral rotation and flexion to the right.    PHQ-9 SCORE 10/12/2017 9/18/2018 9/26/2019   PHQ-9 Total Score - - -   PHQ-9 Total Score MyChart - 9 (Mild depression) -   PHQ-9 Total Score 9 9 12             Assessment & Plan     1. Cervical radiculopathy  She is getting physical therapy with some improvement on the left, not much change in the right.  I am going to have her get a neck x-ray as a baseline, may need some further imaging or neurosurgery referral if not responding well to physical therapy.  Currently there are no red flag symptoms, but advised if she has progressive symptoms, weakness, loss of control bowel or bladder or gait issues, she should call right away  - XR Cervical Spine 2/3 Views; Future    2. Right-sided low back pain without sciatica, unspecified chronicity  Likely myofascial, will add some orders for physical therapy to work with this    3. Mild episode of recurrent major depressive disorder (H)  She has had continued symptoms, may be feeling some response to the Pristiq, would recommend weaning off the Lexapro, see patient instructions, contact me for any significant side effects of medication or lack of improvement.    4. Hyperlipidemia LDL goal <70  Is unclear whether her symptoms are at all related to the statin.  She has not really noticed any significant change in her symptoms after holding it but the timeframe is relatively short.  Advised he can sometimes take much longer to see the improvement after stopping it.  Her symptoms seem to be related to radiculopathy, focal back pain rather than diffuse symptoms I would expect for the statin however recommend " she continue holding statin for now.  I would not recommend starting on Crestor yet because then it will be difficult to know whether the Crestor is related to pain, but still related to the previous statin or completely unrelated.  Once she does start on the Crestor, recommend she try half a tablet every third day or so.  Recommend she consider starting on co-Q10 which may help her symptoms get better faster if related to the statin and may help her tolerate the statin better.  She wants to see what her triglycerides are doing, can check this level only without the LDL since she is holding her statin  -Triglycerides    5. Acquired hypothyroidism  recheck      6. Abnormal blood sugar    - Basic metabolic panel; Future  - Hemoglobin A1c; Future         Return in about 3 months (around 12/26/2019) for Physical Exam.    Gabriella Carter MD  [facility]

## 2019-09-30 DIAGNOSIS — J01.00 ACUTE NON-RECURRENT MAXILLARY SINUSITIS: ICD-10-CM

## 2019-09-30 RX ORDER — FLUTICASONE PROPIONATE 50 MCG
SPRAY, SUSPENSION (ML) NASAL
Qty: 16 ML | Refills: 1 | Status: SHIPPED | OUTPATIENT
Start: 2019-09-30 | End: 2021-05-11

## 2019-09-30 NOTE — TELEPHONE ENCOUNTER
"Requested Prescriptions   Pending Prescriptions Disp Refills     fluticasone (FLONASE) 50 MCG/ACT nasal spray [Pharmacy Med Name: FLUTICASONE PROP 50 MCG SPRAY]  Last Written Prescription Date:  12/07/2018  Last Fill Quantity: 1 bottle,  # refills: 1   Last Office Visit: 9/12/2019   Future Office Visit:      16 mL 1     Sig: INHALE 1-2 SPRAYS INTO BOTH NOSTRILS DAILY       Inhaled Steroids Protocol Passed - 9/30/2019  2:20 AM        Passed - Patient is age 12 or older        Passed - Recent (12 mo) or future (30 days) visit within the authorizing provider's specialty     Patient has had an office visit with the authorizing provider or a provider within the authorizing providers department within the previous 12 mos or has a future within next 30 days. See \"Patient Info\" tab in inbasket, or \"Choose Columns\" in Meds & Orders section of the refill encounter.              Passed - Medication is active on med list          "

## 2019-10-04 ENCOUNTER — THERAPY VISIT (OUTPATIENT)
Dept: PHYSICAL THERAPY | Facility: CLINIC | Age: 54
End: 2019-10-04
Payer: COMMERCIAL

## 2019-10-04 ENCOUNTER — ANCILLARY PROCEDURE (OUTPATIENT)
Dept: GENERAL RADIOLOGY | Facility: CLINIC | Age: 54
End: 2019-10-04
Attending: INTERNAL MEDICINE
Payer: COMMERCIAL

## 2019-10-04 DIAGNOSIS — M54.12 CERVICAL RADICULOPATHY: ICD-10-CM

## 2019-10-04 DIAGNOSIS — E03.9 ACQUIRED HYPOTHYROIDISM: ICD-10-CM

## 2019-10-04 DIAGNOSIS — R73.09 ABNORMAL BLOOD SUGAR: ICD-10-CM

## 2019-10-04 DIAGNOSIS — E78.5 HYPERLIPIDEMIA LDL GOAL <70: ICD-10-CM

## 2019-10-04 LAB
ANION GAP SERPL CALCULATED.3IONS-SCNC: 7 MMOL/L (ref 3–14)
BUN SERPL-MCNC: 11 MG/DL (ref 7–30)
CALCIUM SERPL-MCNC: 9.3 MG/DL (ref 8.5–10.1)
CHLORIDE SERPL-SCNC: 109 MMOL/L (ref 94–109)
CO2 SERPL-SCNC: 25 MMOL/L (ref 20–32)
CREAT SERPL-MCNC: 0.67 MG/DL (ref 0.52–1.04)
GFR SERPL CREATININE-BSD FRML MDRD: >90 ML/MIN/{1.73_M2}
GLUCOSE SERPL-MCNC: 108 MG/DL (ref 70–99)
HBA1C MFR BLD: 5.7 % (ref 0–5.6)
POTASSIUM SERPL-SCNC: 4.1 MMOL/L (ref 3.4–5.3)
SODIUM SERPL-SCNC: 141 MMOL/L (ref 133–144)
TRIGL SERPL-MCNC: 319 MG/DL
TSH SERPL DL<=0.005 MIU/L-ACNC: 2.1 MU/L (ref 0.4–4)

## 2019-10-04 PROCEDURE — 97140 MANUAL THERAPY 1/> REGIONS: CPT | Mod: GP | Performed by: PHYSICAL THERAPIST

## 2019-10-04 PROCEDURE — 80048 BASIC METABOLIC PNL TOTAL CA: CPT | Performed by: INTERNAL MEDICINE

## 2019-10-04 PROCEDURE — 97110 THERAPEUTIC EXERCISES: CPT | Mod: GP | Performed by: PHYSICAL THERAPIST

## 2019-10-04 PROCEDURE — 84443 ASSAY THYROID STIM HORMONE: CPT | Performed by: INTERNAL MEDICINE

## 2019-10-04 PROCEDURE — 72040 X-RAY EXAM NECK SPINE 2-3 VW: CPT

## 2019-10-04 PROCEDURE — 36415 COLL VENOUS BLD VENIPUNCTURE: CPT | Performed by: INTERNAL MEDICINE

## 2019-10-04 PROCEDURE — 84478 ASSAY OF TRIGLYCERIDES: CPT | Performed by: INTERNAL MEDICINE

## 2019-10-04 PROCEDURE — 83036 HEMOGLOBIN GLYCOSYLATED A1C: CPT | Performed by: INTERNAL MEDICINE

## 2019-10-08 ENCOUNTER — MYC MEDICAL ADVICE (OUTPATIENT)
Dept: INTERNAL MEDICINE | Facility: CLINIC | Age: 54
End: 2019-10-08

## 2019-10-08 DIAGNOSIS — F33.0 MILD EPISODE OF RECURRENT MAJOR DEPRESSIVE DISORDER (H): ICD-10-CM

## 2019-10-08 DIAGNOSIS — M54.2 CERVICALGIA: ICD-10-CM

## 2019-10-09 RX ORDER — DESVENLAFAXINE 50 MG/1
50 TABLET, FILM COATED, EXTENDED RELEASE ORAL DAILY
Qty: 90 TABLET | Refills: 1 | Status: SHIPPED | OUTPATIENT
Start: 2019-10-09 | End: 2020-04-24

## 2019-10-09 NOTE — TELEPHONE ENCOUNTER
See her [employer] message. Also, is she referring to generic lexapro or pristiq?     Per the results note:  Viewed by Fallon Pizano on 10/8/2019  4:14 PM   Written by Gabriella Carter MD on 10/8/2019  1:07 PM   The triglyceride level has gone up, considering starting fish oil and try to work it up 2000 units a day.    Per OV note on 9/26/19:  4. Hyperlipidemia LDL goal <70  Is unclear whether her symptoms are at all related to the statin.  She has not really noticed any significant change in her symptoms after holding it but the timeframe is relatively short.  Advised he can sometimes take much longer to see the improvement after stopping it.  Her symptoms seem to be related to radiculopathy, focal back pain rather than diffuse symptoms I would expect for the statin however recommend she continue holding statin for now.  I would not recommend starting on Crestor yet because then it will be difficult to know whether the Crestor is related to pain, but still related to the previous statin or completely unrelated.  Once she does start on the Crestor, recommend she try half a tablet every third day or so.  Recommend she consider starting on co-Q10 which may help her symptoms get better faster if related to the statin and may help her tolerate the statin better.  She wants to see what her triglycerides are doing, can check this level only without the LDL since she is holding her statin  -Triglycerides

## 2019-10-11 ENCOUNTER — THERAPY VISIT (OUTPATIENT)
Dept: PHYSICAL THERAPY | Facility: CLINIC | Age: 54
End: 2019-10-11
Payer: COMMERCIAL

## 2019-10-11 DIAGNOSIS — M54.12 CERVICAL RADICULOPATHY: ICD-10-CM

## 2019-10-11 PROCEDURE — 97110 THERAPEUTIC EXERCISES: CPT | Mod: GP | Performed by: PHYSICAL THERAPIST

## 2019-10-11 PROCEDURE — 97530 THERAPEUTIC ACTIVITIES: CPT | Mod: GP | Performed by: PHYSICAL THERAPIST

## 2019-10-23 DIAGNOSIS — I25.10 CORONARY ARTERY DISEASE INVOLVING NATIVE CORONARY ARTERY OF NATIVE HEART WITHOUT ANGINA PECTORIS: ICD-10-CM

## 2019-10-23 RX ORDER — LISINOPRIL 5 MG/1
TABLET ORAL
Qty: 90 TABLET | Refills: 1 | Status: SHIPPED | OUTPATIENT
Start: 2019-10-23 | End: 2020-04-24

## 2019-10-23 NOTE — TELEPHONE ENCOUNTER
"Requested Prescriptions   Pending Prescriptions Disp Refills     lisinopril (PRINIVIL/ZESTRIL) 5 MG tablet [Pharmacy Med Name: LISINOPRIL 5 MG TABLET] 90 tablet 1     Sig: TAKE 1 TABLET BY MOUTH EVERY DAY   Last Written Prescription Date:  04/03/2019  Last Fill Quantity: 90,  # refills: 01   Last office visit: 9/26/2019 with prescribing provider:     Future Office Visit:      ACE Inhibitors (Including Combos) Protocol Passed - 10/23/2019  2:05 AM        Passed - Blood pressure under 140/90 in past 12 months     BP Readings from Last 3 Encounters:   09/26/19 118/70   09/12/19 114/72   05/09/19 118/76                 Passed - Recent (12 mo) or future (30 days) visit within the authorizing provider's specialty     Patient has had an office visit with the authorizing provider or a provider within the authorizing providers department within the previous 12 mos or has a future within next 30 days. See \"Patient Info\" tab in inbasket, or \"Choose Columns\" in Meds & Orders section of the refill encounter.              Passed - Medication is active on med list        Passed - Patient is age 18 or older        Passed - No active pregnancy on record        Passed - Normal serum creatinine on file in past 12 months     Recent Labs   Lab Test 10/04/19  0829   CR 0.67             Passed - Normal serum potassium on file in past 12 months     Recent Labs   Lab Test 10/04/19  0829   POTASSIUM 4.1             Passed - No positive pregnancy test within past 12 months        "

## 2019-10-25 ENCOUNTER — HOSPITAL ENCOUNTER (OUTPATIENT)
Dept: MRI IMAGING | Facility: CLINIC | Age: 54
Discharge: HOME | End: 2019-10-25
Attending: PHYSICIAN ASSISTANT | Admitting: PHYSICIAN ASSISTANT
Payer: COMMERCIAL

## 2019-10-25 ENCOUNTER — OFFICE VISIT (OUTPATIENT)
Dept: NEUROSURGERY | Facility: CLINIC | Age: 54
End: 2019-10-25
Attending: PHYSICIAN ASSISTANT
Payer: COMMERCIAL

## 2019-10-25 ENCOUNTER — TELEPHONE (OUTPATIENT)
Dept: NEUROSURGERY | Facility: CLINIC | Age: 54
End: 2019-10-25

## 2019-10-25 VITALS
WEIGHT: 142 LBS | BODY MASS INDEX: 24.24 KG/M2 | HEIGHT: 64 IN | OXYGEN SATURATION: 98 % | DIASTOLIC BLOOD PRESSURE: 76 MMHG | HEART RATE: 80 BPM | TEMPERATURE: 97.5 F | SYSTOLIC BLOOD PRESSURE: 116 MMHG

## 2019-10-25 DIAGNOSIS — M54.12 CERVICAL RADICULOPATHY: ICD-10-CM

## 2019-10-25 DIAGNOSIS — M54.12 CERVICAL RADICULOPATHY: Primary | ICD-10-CM

## 2019-10-25 PROCEDURE — 99204 OFFICE O/P NEW MOD 45 MIN: CPT | Performed by: PHYSICIAN ASSISTANT

## 2019-10-25 PROCEDURE — G0463 HOSPITAL OUTPT CLINIC VISIT: HCPCS

## 2019-10-25 PROCEDURE — 72141 MRI NECK SPINE W/O DYE: CPT

## 2019-10-25 ASSESSMENT — MIFFLIN-ST. JEOR: SCORE: 1224.11

## 2019-10-25 ASSESSMENT — PAIN SCALES - GENERAL: PAINLEVEL: MODERATE PAIN (5)

## 2019-10-25 NOTE — PROGRESS NOTES
Dr. Abdulkadir HOLCOMB [facility] Neurosurgery Clinic Visit      CC: Neck and arm pain    Primary care Provider: Gabriella Carter      Reason For Visit:   I was asked by Gabriella Carter MD to consult on the patient for cervical radiculopathy.      HPI: Fallon Pizano is a 54 year old female who presents for evaluation of neck and bilateral arm pain x 2-3 years. Pain is located in bilateral neck and radiates down bilateral arms to the hands. States right arm pain has resolved, but she currently still has left arm pain. Describes the pain as a jolt. She does get associated headaches as well, which are located in bilateral orbital region associated with light sensitivity. She does get associated paresthesias in her arms L>R. Pain is worsened with flexion. Pain is alleviated with physical therapy, but never fully alleviated the pain. She has been doing physical therapy, but feels as though the exercises she does at home cause the pain to worsen. No MRI. They had originally thought the statins she was on was cause of her pain, but pain has not improved with stopping of the statin. Denies bladder/bowel incontinence.    Current pain: 5/10 At worst: 8/10    Past Medical History:   Diagnosis Date     Acquired hypothyroidism 4/7/2017     Allergic rhinitis      CAD (coronary artery disease) 4/11    AMI, LAD stent     Cervicalgia 9/12/2019     Chest pain      Hyperlipidemia LDL goal <70 9/10/2012     Major depression     related to CAD     MI (myocardial infarction) (H) 04/2011     Mild episode of recurrent major depressive disorder (H) 9/10/2012     Myalgia 9/12/2019     Palpitations        Past Medical History reviewed with patient during visit.    Past Surgical History:   Procedure Laterality Date     ANGIOGRAM  4/11    proximal LAD stent     COLONOSCOPY N/A 11/22/2017    Procedure: COLONOSCOPY;  Colonoscopy ;  Surgeon: Jaylon Botello MD;  Location: [facility]     HC ENLARGE BREAST WITH IMPLANT       Past Surgical History  reviewed with patient during visit.    Current Outpatient Medications   Medication     amLODIPine (NORVASC) 5 MG tablet     aspirin 81 MG tablet     carvedilol (COREG) 6.25 MG tablet     desvenlafaxine (PRISTIQ) 50 MG 24 hr tablet     DiphenhydrAMINE HCl (BENADRYL ALLERGY PO)     escitalopram (LEXAPRO) 20 MG tablet     escitalopram (LEXAPRO) 20 MG tablet     fluticasone (FLONASE) 50 MCG/ACT nasal spray     IBUPROFEN PO     levothyroxine (SYNTHROID/LEVOTHROID) 25 MCG tablet     levothyroxine (SYNTHROID/LEVOTHROID) 50 MCG tablet     lisinopril (PRINIVIL/ZESTRIL) 5 MG tablet     nitroGLYcerin (NITROSTAT) 0.4 MG sublingual tablet     rosuvastatin (CRESTOR) 40 MG tablet     traZODone (DESYREL) 50 MG tablet     zolpidem (AMBIEN) 5 MG tablet     No current facility-administered medications for this visit.        Allergies   Allergen Reactions     Atorvastatin Muscle Pain (Myalgia)     Ciprofloxacin Unknown     Reacted to Levofloxacin and was instructed to avoid all Flouroquinolones.  Other reaction(s): *Unknown       Flagyl [Metronidazole Hcl]      Doesn't remember the reaction     Levofloxacin Unknown     Instructed to avoid all fluoroquinolones  Other reaction(s): *Unknown       Levaquin Rash       Social History     Socioeconomic History     Marital status:      Spouse name: Not on file     Number of children: 1     Years of education: Not on file     Highest education level: Not on file   Occupational History     Employer: NONE    Social Needs     Financial resource strain: Not on file     Food insecurity:     Worry: Not on file     Inability: Not on file     Transportation needs:     Medical: Not on file     Non-medical: Not on file   Tobacco Use     Smoking status: Light Tobacco Smoker     Types: Cigarettes     Last attempt to quit: 2011     Years since quittin.5     Smokeless tobacco: Never Used     Tobacco comment: smokes occ   Substance and Sexual Activity     Alcohol use: Yes     Alcohol/week: 0.0  standard drinks     Comment: Casual     Drug use: No     Sexual activity: Yes     Partners: Male   Lifestyle     Physical activity:     Days per week: Not on file     Minutes per session: Not on file     Stress: Not on file   Relationships     Social connections:     Talks on phone: Not on file     Gets together: Not on file     Attends [employer] service: Not on file     Active member of club or organization: Not on file     Attends meetings of clubs or organizations: Not on file     Relationship status: Not on file     Intimate partner violence:     Fear of current or ex partner: Not on file     Emotionally abused: Not on file     Physically abused: Not on file     Forced sexual activity: Not on file   Other Topics Concern     Parent/sibling w/ CABG, MI or angioplasty before 65F 55M? Not Asked      Service Not Asked     Blood Transfusions Not Asked     Caffeine Concern Yes     Comment: 3+ daily     Occupational Exposure Not Asked     Hobby Hazards Not Asked     Sleep Concern No     Stress Concern No     Weight Concern Not Asked     Special Diet No     Comment: watches fats and sodium     Back Care Not Asked     Exercise No     Bike Helmet Not Asked     Seat Belt Not Asked     Self-Exams Not Asked   Social History Narrative     Not on file       Family History   Problem Relation Age of Onset     Diabetes Mother      C.A.D. Mother      Heart Disease Mother         valve disease, pacer     Connective Tissue Disorder Mother         autoimmune disease     Gastrointestinal Disease Mother         cirrhosis, non alcoholic     C.A.D. Father 55          ROS: 10 point ROS neg other than the symptoms noted above in the HPI.    Vital Signs: LMP 07/18/2013     Examination:  Constitutional:  Alert, well nourished, NAD.  HEENT: Normocephalic, atraumatic.   Pulmonary:  Without shortness of breath, normal effort.   Lymph: no lymphadenopathy to low back or LE.   Integumentary: Skin is free of rashes or lesions.    Cardiovascular:  No pitting edema of BLE.      Neurological:  Awake  Alert  Oriented x 3  Speech clear  Cranial nerves II - XII grossly intact  PERRL  EOMI  Face symmetric  Tongue midline  Motor exam   Shoulder Abduction:  Right:  5/5   Left:  5/5  Biceps:                      Right:  5/5   Left:  5/5  Triceps:                     Right:  5/5   Left:  5/5  Wrist Extensors:       Right:  5/5   Left:  5/5  Wrist Flexors:           Right:  5/5   Left:  5/5  Intrinsics:                   Right:  5/5   Left:  5/5       Sensation normal to bilateral upper extremities.    Reflexes are 2+ in the brachial radialis and triceps. Negative Thelma sign bilaterally.  Musculoskeletal:  Gait: Able to stand from a seated position. Normal non-antalgic, non-myelopathic gait.  Able to heel/toe walk without loss of balance  Cervical examination reveals decreased range of motion in all planes.  No tenderness to palpation of the cervical spine or paraspinous muscles bilaterally.  +Tenderness to left deltoid muscle    Imaging:   XR of the cervical spine from 10/4/2019 was reviewed in the office today. Reveals severe degeneration at C4-5 and C5-6.     Assessment/Plan:   Fallon Pizano is a 54 year old female who presents for evaluation of neck and bilateral arm pain x 2-3 years. Pain is located in bilateral neck and radiates down bilateral arms to the hands. States right arm pain has resolved, but she currently still has left arm pain. Describes the pain as a jolt. She does get associated headaches as well, which are located in bilateral orbital region associated with light sensitivity. She does get associated paresthesias in her arms L>R. She has been doing physical therapy, but feels as though the exercises she does at home cause the pain to worsen. XRs with severe degenerative changes. Given her XR findings, ongoing radicular symptoms, and no lasting relief with PT, will obtain cervical MRI and call her with results. Patient  voiced understanding and agreement.          Humaira Freitas PA-C  [facility] Neurosurgery  [address]  [address] [place] [address], [place] [address]    Tel [phone]  Pager [phone]

## 2019-10-25 NOTE — NURSING NOTE
"Fallon Pizano is a 54 year old female who presents for:  Chief Complaint   Patient presents with     Consult For     Neck pain        Initial Vitals:  /76 (BP Location: Right arm, Patient Position: Sitting, Cuff Size: Adult Large)   Pulse 80   Temp 97.5  F (36.4  C) (Oral)   Ht 5' 4\" (1.626 m)   Wt 142 lb (64.4 kg)   LMP 07/18/2013   SpO2 98%   BMI 24.37 kg/m   Estimated body mass index is 24.37 kg/m  as calculated from the following:    Height as of this encounter: 5' 4\" (1.626 m).    Weight as of this encounter: 142 lb (64.4 kg).. Body surface area is 1.71 meters squared. BP completed using cuff size: large  Moderate Pain (5)        Nursing Comments: Patient states that she has neck pain, headaches and left sided arm and shoulder pain.         Cammie Lloyd, PATRICIA    "

## 2019-10-25 NOTE — LETTER
10/25/2019         RE: Fallon Pizano  [address]  [facility] [address]        Dear Colleague,    Thank you for referring your patient, Fallon Pizano, to the [facility] NEUROSURGERY [facility]INIC. Please see a copy of my visit note below.    Dr. Abdulkadir HOLCOMB [facility] Neurosurgery Clinic Visit      CC: Neck and arm pain    Primary care Provider: Gabriella Carter      Reason For Visit:   I was asked by Gabriella Carter MD to consult on the patient for cervical radiculopathy.      HPI: Fallon Pizano is a 54 year old female who presents for evaluation of neck and bilateral arm pain x 2-3 years. Pain is located in bilateral neck and radiates down bilateral arms to the hands. States right arm pain has resolved, but she currently still has left arm pain. Describes the pain as a jolt. She does get associated headaches as well, which are located in bilateral orbital region associated with light sensitivity. She does get associated paresthesias in her arms L>R. Pain is worsened with flexion. Pain is alleviated with physical therapy, but never fully alleviated the pain. She has been doing physical therapy, but feels as though the exercises she does at home cause the pain to worsen. No MRI. They had originally thought the statins she was on was cause of her pain, but pain has not improved with stopping of the statin. Denies bladder/bowel incontinence.    Current pain: 5/10 At worst: 8/10    Past Medical History:   Diagnosis Date     Acquired hypothyroidism 4/7/2017     Allergic rhinitis      CAD (coronary artery disease) 4/11    AMI, LAD stent     Cervicalgia 9/12/2019     Chest pain      Hyperlipidemia LDL goal <70 9/10/2012     Major depression     related to CAD     MI (myocardial infarction) (H) 04/2011     Mild episode of recurrent major depressive disorder (H) 9/10/2012     Myalgia 9/12/2019     Palpitations        Past Medical History reviewed with patient during  visit.    Past Surgical History:   Procedure Laterality Date     ANGIOGRAM  4/11    proximal LAD stent     COLONOSCOPY N/A 11/22/2017    Procedure: COLONOSCOPY;  Colonoscopy ;  Surgeon: Jaylon Botello MD;  Location: [facility]     HC ENLARGE BREAST WITH IMPLANT       Past Surgical History reviewed with patient during visit.    Current Outpatient Medications   Medication     amLODIPine (NORVASC) 5 MG tablet     aspirin 81 MG tablet     carvedilol (COREG) 6.25 MG tablet     desvenlafaxine (PRISTIQ) 50 MG 24 hr tablet     DiphenhydrAMINE HCl (BENADRYL ALLERGY PO)     escitalopram (LEXAPRO) 20 MG tablet     escitalopram (LEXAPRO) 20 MG tablet     fluticasone (FLONASE) 50 MCG/ACT nasal spray     IBUPROFEN PO     levothyroxine (SYNTHROID/LEVOTHROID) 25 MCG tablet     levothyroxine (SYNTHROID/LEVOTHROID) 50 MCG tablet     lisinopril (PRINIVIL/ZESTRIL) 5 MG tablet     nitroGLYcerin (NITROSTAT) 0.4 MG sublingual tablet     rosuvastatin (CRESTOR) 40 MG tablet     traZODone (DESYREL) 50 MG tablet     zolpidem (AMBIEN) 5 MG tablet     No current facility-administered medications for this visit.        Allergies   Allergen Reactions     Atorvastatin Muscle Pain (Myalgia)     Ciprofloxacin Unknown     Reacted to Levofloxacin and was instructed to avoid all Flouroquinolones.  Other reaction(s): *Unknown       Flagyl [Metronidazole Hcl]      Doesn't remember the reaction     Levofloxacin Unknown     Instructed to avoid all fluoroquinolones  Other reaction(s): *Unknown       Levaquin Rash       Social History     Socioeconomic History     Marital status:      Spouse name: Not on file     Number of children: 1     Years of education: Not on file     Highest education level: Not on file   Occupational History     Employer: NONE    Social Needs     Financial resource strain: Not on file     Food insecurity:     Worry: Not on file     Inability: Not on file     Transportation needs:     Medical: Not on file     Non-medical: Not on  file   Tobacco Use     Smoking status: Light Tobacco Smoker     Types: Cigarettes     Last attempt to quit: 2011     Years since quittin.5     Smokeless tobacco: Never Used     Tobacco comment: smokes occ   Substance and Sexual Activity     Alcohol use: Yes     Alcohol/week: 0.0 standard drinks     Comment: Casual     Drug use: No     Sexual activity: Yes     Partners: Male   Lifestyle     Physical activity:     Days per week: Not on file     Minutes per session: Not on file     Stress: Not on file   Relationships     Social connections:     Talks on phone: Not on file     Gets together: Not on file     Attends L[employer] service: Not on file     Active member of club or organization: Not on file     Attends meetings of clubs or organizations: Not on file     Relationship status: Not on file     Intimate partner violence:     Fear of current or ex partner: Not on file     Emotionally abused: Not on file     Physically abused: Not on file     Forced sexual activity: Not on file   Other Topics Concern     Parent/sibling w/ CABG, MI or angioplasty before 65F 55M? Not Asked      Service Not Asked     Blood Transfusions Not Asked     Caffeine Concern Yes     Comment: 3+ daily     Occupational Exposure Not Asked     Hobby Hazards Not Asked     Sleep Concern No     Stress Concern No     Weight Concern Not Asked     Special Diet No     Comment: watches fats and sodium     Back Care Not Asked     Exercise No     Bike Helmet Not Asked     Seat Belt Not Asked     Self-Exams Not Asked   Social History Narrative     Not on file       Family History   Problem Relation Age of Onset     Diabetes Mother      C.A.D. Mother      Heart Disease Mother         valve disease, pacer     Connective Tissue Disorder Mother         autoimmune disease     Gastrointestinal Disease Mother         cirrhosis, non alcoholic     C.A.D. Father 55          ROS: 10 point ROS neg other than the symptoms noted above in the HPI.    Vital  Signs: LMP 07/18/2013     Examination:  Constitutional:  Alert, well nourished, NAD.  HEENT: Normocephalic, atraumatic.   Pulmonary:  Without shortness of breath, normal effort.   Lymph: no lymphadenopathy to low back or LE.   Integumentary: Skin is free of rashes or lesions.   Cardiovascular:  No pitting edema of BLE.      Neurological:  Awake  Alert  Oriented x 3  Speech clear  Cranial nerves II - XII grossly intact  PERRL  EOMI  Face symmetric  Tongue midline  Motor exam   Shoulder Abduction:  Right:  5/5   Left:  5/5  Biceps:                      Right:  5/5   Left:  5/5  Triceps:                     Right:  5/5   Left:  5/5  Wrist Extensors:       Right:  5/5   Left:  5/5  Wrist Flexors:           Right:  5/5   Left:  5/5  Intrinsics:                   Right:  5/5   Left:  5/5       Sensation normal to bilateral upper extremities.    Reflexes are 2+ in the brachial radialis and triceps. Negative Thelma sign bilaterally.  Musculoskeletal:  Gait: Able to stand from a seated position. Normal non-antalgic, non-myelopathic gait.  Able to heel/toe walk without loss of balance  Cervical examination reveals decreased range of motion in all planes.  No tenderness to palpation of the cervical spine or paraspinous muscles bilaterally.  +Tenderness to left deltoid muscle    Imaging:   XR of the cervical spine from 10/4/2019 was reviewed in the office today. Reveals severe degeneration at C4-5 and C5-6.     Assessment/Plan:   Fallon Pizano is a 54 year old female who presents for evaluation of neck and bilateral arm pain x 2-3 years. Pain is located in bilateral neck and radiates down bilateral arms to the hands. States right arm pain has resolved, but she currently still has left arm pain. Describes the pain as a jolt. She does get associated headaches as well, which are located in bilateral orbital region associated with light sensitivity. She does get associated paresthesias in her arms L>R. She has been  doing physical therapy, but feels as though the exercises she does at home cause the pain to worsen. XRs with severe degenerative changes. Given her XR findings, ongoing radicular symptoms, and no lasting relief with PT, will obtain cervical MRI and call her with results. Patient voiced understanding and agreement.          Humaira Freitas PA-C  [facility] Neurosurgery  [address] [address]    Tel [phone]  Pager [phone]      Again, thank you for allowing me to participate in the care of your patient.        Sincerely,        Humaira Freitas PA-C

## 2019-10-25 NOTE — TELEPHONE ENCOUNTER
Per Humaira DUARTE, MRI shows some pretty bad stenosis at C5-6 and to a lesser degree at C4-5. Would recommend to continue with PT and can try BRYSON. If symptoms not alleviated, would recommend follow up with Dr. Arriaza if interested in pursing surgical options.     Contacted patient. She voiced agreement with trying BRYSON. Advised her to call back if symptoms persist after BRYSON.

## 2019-10-26 DIAGNOSIS — E03.9 ACQUIRED HYPOTHYROIDISM: ICD-10-CM

## 2019-10-27 RX ORDER — LEVOTHYROXINE SODIUM 25 UG/1
TABLET ORAL
Qty: 45 TABLET | Refills: 3 | Status: SHIPPED | OUTPATIENT
Start: 2019-10-27 | End: 2020-10-21

## 2019-10-27 NOTE — TELEPHONE ENCOUNTER
Prescription approved per [facility] Refill Protocol.  Sulema Rosario RN

## 2019-11-03 DIAGNOSIS — E78.5 HYPERLIPIDEMIA LDL GOAL <70: ICD-10-CM

## 2019-11-03 DIAGNOSIS — I25.10 CORONARY ARTERY DISEASE INVOLVING NATIVE CORONARY ARTERY OF NATIVE HEART WITHOUT ANGINA PECTORIS: ICD-10-CM

## 2019-11-04 RX ORDER — ROSUVASTATIN CALCIUM 40 MG/1
TABLET, COATED ORAL
Qty: 30 TABLET | Refills: 5 | Status: SHIPPED | OUTPATIENT
Start: 2019-11-04 | End: 2020-06-22

## 2019-11-04 NOTE — TELEPHONE ENCOUNTER
Routing refill request to provider for review/approval because:  Drug interaction warning  Please authorize if appropriate.  Thanks,  Venice Elias RN

## 2019-11-04 NOTE — TELEPHONE ENCOUNTER
"Requested Prescriptions   Pending Prescriptions Disp Refills     rosuvastatin (CRESTOR) 40 MG tablet [Pharmacy Med Name: ROSUVASTATIN CALCIUM 40 MG TAB] 30 tablet 2     Sig: TAKE 1 TABLET BY MOUTH EVERY DAY     Last Written Prescription Date:  09/12/2019  Last Fill Quantity: 30 tablet,  # refills: 2   Last office visit: 9/12/2019 with prescribing provider:  09/12/2019   Future Office Visit:          Statins Protocol Passed - 11/3/2019  3:37 AM        Passed - LDL on file in past 12 months     Recent Labs   Lab Test 04/09/19  0757   *             Passed - No abnormal creatine kinase in past 12 months     Recent Labs   Lab Test 09/12/19  1408   CKT 73                Passed - Recent (12 mo) or future (30 days) visit within the authorizing provider's specialty     Patient has had an office visit with the authorizing provider or a provider within the authorizing providers department within the previous 12 mos or has a future within next 30 days. See \"Patient Info\" tab in inbasket, or \"Choose Columns\" in Meds & Orders section of the refill encounter.              Passed - Medication is active on med list        Passed - Patient is age 18 or older        Passed - No active pregnancy on record        Passed - No positive pregnancy test in past 12 months          Alex LINN  "

## 2019-11-11 NOTE — PROGRESS NOTES
[facility] - Procedure Note    Date of Visit: 11/13/2019    Procedure performed: C7-T1 interlaminar epidural steroid injection with fluoroscopic guidance  Diagnosis: Cervical spondylosis; Cervical radiculitis/radiculopathy  : Greer Altamirano MD & ERROL Vazquez (pain fellow)   Anesthesia: none    Indications: Fallon Pizano is a 54 year old female who is seen at the request of Humaira Freitas PA-C for cervical epidural steroid injection. The patient describes neck pain radiating into both arms L>R. The patient has been exhibiting symptoms consistent with cervical intraspinal inflammation and radiculopathy. Symptoms have been persistent, disabling, and intermittently severe. The patient reports minimal improvement with conservative treatment, including PT and medications.  This is her first injection and she is VERY nervous.    Cervical MRI was done on 10/25/2019 which showed:  FINDINGS: The cervical cord has normal signal intensity. No  myelomalacia identified. Alignment is normal.  The paraspinal soft  tissues appear normal. The bone marrow has normal signal intensity.      Craniocervical Junction and C1-C2:  Normal.     C2-C3:  The disc has a normal appearance. Central canal and neural  foramina are patent.     C3-C4:  Small central disc osteophyte complex causing slight mass  effect on the dural sac. Central canal and neural foramina are patent.     C4-C5:  Loss of disc space height. Broad-based disc osteophyte complex  extending to the right and left of midline. This is leading to  moderate central spinal stenosis. Moderate-severe right and moderate  left foraminal stenosis due to loss of disc space height and uncinate  spurs.     C5-C6:  Loss of disc space height. Broad-based disc osteophyte complex  extending to the right and left of midline and causing mass effect on  the dural sac. This is leading to moderate-severe central spinal  stenosis. Moderate-severe right and  left foraminal stenosis due to  loss of disc space height and uncinate spurs.     C6-C7:  Loss of disc space height. Mild annular disc bulge. Central  canal mildly narrowed. Mild right and moderate left foraminal  stenosis.     C7-T1: Normal disc, facet joints, spinal canal and neural foramina.     T1-T2:  Normal disc, facet joints, spinal canal and neural foramina.                                                                       IMPRESSION:    1. Moderate-severe central stenosis at C5-C6. Moderate central spinal  stenosis at C4-C5. This is due to broad-based disc osteophyte  complexes at these levels.  2. Moderate-severe right C4-C5, right C5-C6 and left C5-C6 foraminal  stenosis due to loss of disc space height and uncinate spurs. Moderate  left C4-C5 and left C6-C7 foraminal stenosis.    Allergies:      Allergies   Allergen Reactions     Atorvastatin Muscle Pain (Myalgia)     Ciprofloxacin Unknown     Reacted to Levofloxacin and was instructed to avoid all Flouroquinolones.  Other reaction(s): *Unknown       Flagyl [Metronidazole Hcl]      Doesn't remember the reaction     Levofloxacin Unknown     Instructed to avoid all fluoroquinolones  Other reaction(s): *Unknown       Levaquin Rash        Vitals:  /75   Pulse 77   LMP 07/18/2013   SpO2 94%     Review of Systems: The patient denies recent fever, chills, illness, use of antibiotics or anticoagulants. All other 10-point review of systems negative.     Procedure: The procedure and risks were explained, and informed written consent was obtained from the patient. Risks include but are not limited to: infection, bleeding, increased pain, and damage to soft tissue, nerve, muscle, and vasculature structures. After getting informed consent, patient was brought into the procedure suite and was placed in a prone position on the procedure table. A Pause for the Cause was performed. Patient was prepped and draped in sterile fashion.     The C7-T1 interspace  was identified with use of fluoroscopy in AP view. A 25-gauge, 1.5 inch needle was used to anesthetize the skin and subcutaneous tissue entry site with a total of 2 ml of 1% lidocaine. Under fluoroscopic visualization, a 22-gauge, 3.5 inch Tuohy epidural needle was slowly advanced towards the epidural space a few millimeters left of midline. The latter part of the needle advancement was guided with fluoroscopy in the lateral view. The epidural space was identified using loss of resistance technique. After negative aspiration for heme and cerebrospinal fluid, a total of 1 mL of non-ionic contrast was injected to confirm needle placement. 9 mL of contrast was wasted. Epidurogram confirmed spread within the posterior epidural space. 2 ml of 10mg/ml of dexamethasone and 1 ml of preservative free 1% lidocaine was injected. The needle was removed.  Images were saved to PACS.    The patient tolerated the procedure well, and there was no evidence of procedural complications. No new sensory or motor deficits were noted following the procedure. The patient was stable and able to ambulate on discharge home. Post-procedure instructions were provided.     Pre-procedure pain score: 6/10 in the neck, 6/10 in the arm  Post-procedure pain score: 2-3/10 in the neck, 2-3/10 in the arm    Assessment/Plan: Fallon Pizano is a 54 year old female s/p cervical interlaminar epidural steroid injection today for cervical spondylosis and radiculitis/radiculopathy.     1. Following today's procedure, the patient was advised to contact the Pain Management Center for any of the following:   Fever, chills, or night sweats   New onset of pain, numbness, or weakness   Any questions/concerns regarding the procedure  If unable to contact the Pain Center, the patient was instructed to go to a local Emergency Room for any complications.   2. The patient will receive a follow-up call in 1 week.   3. Follow-up with the referring provider in 2  weeks for post-procedure evaluation.      Greer Altamirano MD   [facility] [place]

## 2019-11-13 ENCOUNTER — RADIOLOGY INJECTION OFFICE VISIT (OUTPATIENT)
Dept: PALLIATIVE MEDICINE | Facility: CLINIC | Age: 54
End: 2019-11-13
Payer: COMMERCIAL

## 2019-11-13 ENCOUNTER — ANCILLARY PROCEDURE (OUTPATIENT)
Dept: GENERAL RADIOLOGY | Facility: CLINIC | Age: 54
End: 2019-11-13
Attending: ANESTHESIOLOGY
Payer: COMMERCIAL

## 2019-11-13 VITALS — HEART RATE: 68 BPM | DIASTOLIC BLOOD PRESSURE: 84 MMHG | SYSTOLIC BLOOD PRESSURE: 127 MMHG | OXYGEN SATURATION: 96 %

## 2019-11-13 DIAGNOSIS — M54.12 CERVICAL RADICULOPATHY: Primary | ICD-10-CM

## 2019-11-13 DIAGNOSIS — M54.12 CERVICAL RADICULOPATHY: ICD-10-CM

## 2019-11-13 PROCEDURE — 62321 NJX INTERLAMINAR CRV/THRC: CPT | Performed by: ANESTHESIOLOGY

## 2019-11-13 NOTE — PATIENT INSTRUCTIONS
[facility] Procedure Discharge Instructions    Today you saw:   Dr. Greer Altamirano      Your procedure:  Epidural steroid injection       Medications used:  Lidocaine (anesthetic) Dexamethasone (steroid)    Omnipaque (contrast)    Normal Saline      If you were holding your blood thinning medication, please restart taking your Asprin tomorrow          Do not drive for 6 hours. The effect of the local anesthetic could slow your reflexes.     Avoid strenuous activity for the first 24 hours. You may resume your regular activities after that.     You may shower, however avoid swimming, tub baths or hot tubs for 24 hours following your procedure    You may have a mild to moderate increase in pain for several days following the injection.      You may use ice packs for 10-15 minutes, 3 to 4 times a day at the injection site for comfort    Do not use heat to painful areas for 6 to 8 hours. This will give the local anesthetic time to wear off and prevent you from accidentally burning your skin.    Unless you have been directed to avoid the use of anti-inflammatory medications (NSAIDS-ibuprofen, Aleve, Motrin), you may use these medications or Tylenol for pain control if needed.     With diabetes, check your blood sugar more frequently than usual as your blood sugar may be higher than normal for 10-14 days following a steroid injection. Contact your doctor who manages your diabetes if your blood sugar is higher than usual    Possible side effects of steroids that you may experience include flushing, elevated blood pressure, increased appetite, mild headaches and restlessness.  All of these symptoms will get better with time.    It may take up to 14 days for the steroid medication to start working although you may feel the effect as early as a few days after the procedure.     Follow up with your referring provider in 2-3 weeks      If you experience any of the following, call the pain center line during work  hours at [phone] or on-call physician after hours at [phone]:  -Fever over 100 degree F  -Swelling, bleeding, redness, drainage, warmth at the injection site  -Progressive weakness or numbness in your  arms  -Unusual headache that is not relieved by Tylenol or your regular headache medication  -Unusual new onset of pain that is not improving

## 2019-11-13 NOTE — NURSING NOTE
Pre-procedure Intake    Have you been fasting? NA    If yes, for how long?     Are you taking a prescribed blood thinner such as coumadin, Plavix, Xarelto?    No    If yes, when did you take your last dose?     Do you take aspirin?  Yes -   ASA    If cervical procedure, have you held aspirin for 6 days?   Yes    Do you have any allergies to contrast dye, iodine, steroid and/or numbing medications?  YES: Contrast Dye    Are you currently taking antibiotics or have an active infection?  NO    Have you had a fever/elevated temperature within the past week? NO    Are you currently taking oral steroids? NO    Do you have a ? Yes       Are you pregnant or breastfeeding?  NO    Are the vital signs normal?  Yes

## 2019-11-13 NOTE — NURSING NOTE
Discharge Information    IV Discontiued Time:  NA    Amount of Fluid Infused:  NA    Discharge Criteria = When patient returns to baseline or as per MD order    Consciousness:  Pt is fully awake    Circulation:  BP +/- 20% of pre-procedure level    Respiration:  Patient is able to breathe deeply    O2 Sat:  Patient is able to maintain O2 Sat >92% on room air    Activity:  Moves 4 extremities on command    Ambulation:  Patient is able to stand and walk or stand and pivot into wheelchair    Dressing:  Clean/dry or No Dressing    Notes:   Discharge instructions and AVS given to patient    Patient meets criteria for discharge?  YES    Admitted to PCU?  No    Responsible adult present to accompany patient home?  Yes    Signature/Title:    Shayy Villareal RN Care Coordinator  [facility] [place]

## 2019-11-23 ENCOUNTER — TELEPHONE (OUTPATIENT)
Dept: PALLIATIVE MEDICINE | Facility: CLINIC | Age: 54
End: 2019-11-23

## 2019-11-23 NOTE — TELEPHONE ENCOUNTER
Patient had a C7-T1 interlaminar epidural steroid  injection on 11/13/19.  Called patient for an update.      Left message that we were calling for an update about how s/he was doing after the injection.  LM that if s/he has any problems or questions to call the clinic at [phone].

## 2019-12-11 DIAGNOSIS — R07.89 OTHER CHEST PAIN: ICD-10-CM

## 2019-12-12 RX ORDER — AMLODIPINE BESYLATE 5 MG/1
TABLET ORAL
Qty: 90 TABLET | Refills: 2 | Status: SHIPPED | OUTPATIENT
Start: 2019-12-12 | End: 2020-09-22

## 2019-12-12 NOTE — PROGRESS NOTES
Subjective:  HPI                    Objective:  System    Physical Exam    General     ROS    Assessment/Plan:    DISCHARGE REPORT    Progress reporting period is from 9-13-19 to 10-11-19.       SUBJECTIVE  Subjective changes noted by patient:  .  Subjective: Pt currently has a GOLDEN. Pt states she is unchanged since last visit.    Current pain level is 3/10 Current Pain level: 3/10.     Previous pain level was  9/10 Initial Pain level: 9/10.   Changes in function:  None  Adverse reaction to treatment or activity: None    OBJECTIVE  Changes noted in objective findings:  Patient has failed to return to therapy so current objective findings are unknown.  Objective: Cx ROM: RET=min/mod loss; EXT=mod loss erp; R Rot=mod loss prod HA; L ROT=mod loss; TX ROM: L Rot=mod loss; R Rot=min/mod loss; Ext=mod/amanda loss     ASSESSMENT/PLAN  Updated problem list and treatment plan: Diagnosis 1:  HA/neck pain  Pain -  manual therapy, self management, education, directional preference exercise and home program  Decreased ROM/flexibility - manual therapy and therapeutic exercise  Decreased strength - therapeutic exercise and therapeutic activities  Decreased function - therapeutic activities  Impaired posture - neuro re-education  STG/LTGs have been met or progress has been made towards goals:  None  Assessment of Progress: The patient has not returned to therapy. Current status is unknown.  Self Management Plans:  Patient is independent in a home treatment program.  Patient is independent in self management of symptoms.  I have re-evaluated this patient and find that the nature, scope, duration and intensity of the therapy is appropriate for the medical condition of the patient.  Fallon continues to require the following intervention to meet STG and LTG's:  PT intervention is no longer required to meet STG/LTG.    Recommendations:  This patient is ready to be discharged from therapy and continue their home treatment program.    Please  refer to the daily flowsheet for treatment today, total treatment time and time spent performing 1:1 timed codes.

## 2020-01-07 PROBLEM — M54.12 CERVICAL RADICULOPATHY: Status: RESOLVED | Noted: 2019-09-12 | Resolved: 2020-01-07

## 2020-02-19 DIAGNOSIS — E03.9 ACQUIRED HYPOTHYROIDISM: ICD-10-CM

## 2020-02-20 NOTE — TELEPHONE ENCOUNTER
"Requested Prescriptions   Pending Prescriptions Disp Refills     levothyroxine 50 MCG PO tablet [Pharmacy Med Name: LEVOTHYROXINE 50 MCG  Last Written Prescription Date:  10/30/2017  Last Fill Quantity: 45,  # refills: 3   Last office visit: 9/26/2019 with prescribing provider:     Future Office Visit:   Next 5 appointments (look out 90 days)    Feb 24, 2020 10:00 AM CST  Return Visit with Abdulkadir Arriaza MD  [facility] ([facility]) [address]  [facility] [id number] [phone]   May 19, 2020  9:15 AM CDT  Return Visit with Babatunde Hernandez MD  [facility] ([facility]) [address]  [facility] [id number] [phone]        TABLET] 90 tablet 3     Sig: TAKE 1 TABLET BY MOUTH EVERY DAY       Thyroid Protocol Passed - 2/19/2020 11:02 PM        Passed - Patient is 12 years or older        Passed - Recent (12 mo) or future (30 days) visit within the authorizing provider's specialty     Patient has had an office visit with the authorizing provider or a provider within the authorizing providers department within the previous 12 mos or has a future within next 30 days. See \"Patient Info\" tab in inbasket, or \"Choose Columns\" in Meds & Orders section of the refill encounter.              Passed - Medication is active on med list        Passed - Normal TSH on file in past 12 months     Recent Labs   Lab Test 10/04/19  0829   TSH 2.10              Passed - No active pregnancy on record     If patient is pregnant or has had a positive pregnancy test, please check TSH.          Passed - No positive pregnancy test in past 12 months     If patient is pregnant or has had a positive pregnancy test, please check TSH.          "

## 2020-02-21 RX ORDER — LEVOTHYROXINE SODIUM 50 UG/1
TABLET ORAL
Qty: 45 TABLET | Refills: 2 | Status: SHIPPED | OUTPATIENT
Start: 2020-02-21 | End: 2020-10-21

## 2020-02-21 NOTE — TELEPHONE ENCOUNTER
"Routing refill request to provider for review/approval because:  Clarify directions.  Levothyroxine 25mcg listed on current medication list and directions state:  \"TAKE 1 TABLET BY MOUTH EVERY OTHER DAY ALTERNATING WITH 50 MCG TABLETS\".  Last refill for Levothyroxine 50mcg was 10/30/17.    Pended medication has directions that state take daily.    And patient is over-due for an appointment.    Please clarify dose and directions, thanks.      "

## 2020-02-21 NOTE — TELEPHONE ENCOUNTER
This is still supposed to be every other day.  Someone just canceled the wrong prescription.  She does not need to see me, her labs were done in October and I saw her in September.  She can follow-up next October.

## 2020-02-24 ENCOUNTER — OFFICE VISIT (OUTPATIENT)
Dept: NEUROSURGERY | Facility: CLINIC | Age: 55
End: 2020-02-24
Attending: NEUROLOGICAL SURGERY
Payer: COMMERCIAL

## 2020-02-24 VITALS
TEMPERATURE: 98 F | OXYGEN SATURATION: 98 % | HEART RATE: 68 BPM | HEIGHT: 64 IN | SYSTOLIC BLOOD PRESSURE: 101 MMHG | BODY MASS INDEX: 24.24 KG/M2 | WEIGHT: 142 LBS | DIASTOLIC BLOOD PRESSURE: 71 MMHG

## 2020-02-24 DIAGNOSIS — M48.02 SPINAL STENOSIS IN CERVICAL REGION: Primary | ICD-10-CM

## 2020-02-24 PROCEDURE — 99214 OFFICE O/P EST MOD 30 MIN: CPT | Performed by: NEUROLOGICAL SURGERY

## 2020-02-24 PROCEDURE — G0463 HOSPITAL OUTPT CLINIC VISIT: HCPCS

## 2020-02-24 ASSESSMENT — MIFFLIN-ST. JEOR: SCORE: 1224.11

## 2020-02-24 ASSESSMENT — PAIN SCALES - GENERAL: PAINLEVEL: MODERATE PAIN (5)

## 2020-02-24 NOTE — PATIENT INSTRUCTIONS
Surgery scheduled at [facility] for 4-6 ACDF (anterior cervical discectomy and fusion)       Pre-Operative:  -Surgical risks: blood clots in the leg or lung, problems urinating, nerve damage, drainage from the incision, infection, stiffness.  - Pre-operative physical with primary care physician within 30 days of surgical date.   -Stop all solid foods and liquids 8 hours before surgery.    -Shower procedure: Please shower with antibacterial soap the night before surgery and the morning of surgery. Refer to information sheet in folder.   - Discontinue Aspirin, NSAIDs (Advil, Ibuprofen, Naproxen, Nuprin, Diclofenac, Meloxicam, Aleve, Celebrex) x 7 days prior to surgical date. After surgery, do not begin taking these medications until given clearance. May cause bleeding and interfere with bone healing.  - Discontinue Plavix x 7-10 days prior to surgical date, stay off Plavix 3-4 days post operatively. Discontinue Xarelto 5-7 days prior to surgery. Discontinue coumadin/warfarin 5-7 days prior to surgery. INR needs to be <1.4/  - May take Tylenol for pain.      Post-Operative:  -Same day surgery, may stay overnight  - Post operative pain may require pain medications and muscle relaxants. You will receive medications upon discharge.  -Do NOT drive while taking narcotic pain medication.  -Post operative incision care- Watch for signs of infection: redness, swelling, warmth, drainage, and fever of 101 degrees or higher. Notify clinic [phone].  -Keep incision clean and dry. You may shower. No submerging incision in water such as pools, hot tubs, baths for at least 8 weeks or until incision is healed.   - Post operative activity limitations for 6 weeks after surgery: no lifting > 10 pounds, limited bending, twisting, or overhead reaching. You will be re-evaluated at your follow up appointments.   -If a brace is required per Dr. Arriaza, [facility] will fit you for the brace in the hospital.  -If you are  currently employed, you will need to be off work for recovery and healing. Please fax any FMLA/short term disability paperwork to [phone]. You may call our clinic when you'd like to return to work and we can provide a work letter.   - Follow up appointments: 6 week post op, 3 months post op, 6 months post op, 1 year post op. You will need to an xray before each appointment. Please call to schedule these appointments at [phone].    SMOKING CESSATION  What's in cigarette smoke? - Cigarette smoke contains over 4,000 chemicals. Nicotine is one of the main ingredients which is an insecticide/herbicide. It is poisonous to our nervous system, increases blood clotting risk, and decreases the body's defenses to fight off infection. Another chemical is Carbon Monoxide is an asphyxiating gas that permanently binds to blood cells and blocks the transport of oxygen. This leads to tissue death and decreases your metabolism. Tar is a chemical that coats your lungs and trachea which impairs new oxygen coming in and carbon dioxide getting out of your body.   How does smoking impact surgery? - Smoking is particularly hazardous with regards to surgery. Surgery puts stress on the body and a smoker's body is already under strain from these chemicals. Putting the two together, especially for an elective surgery, could be a recipe for disaster. Smoking before and after surgery increases your risk of heart problems, slow wound healing, delayed bone healing, blood clots, wound infection and anesthesia complications.   What are the benefits of quitting? - In 20 minutes your blood pressure will drop back down to normal. In 8 hours the carbon monoxide (a toxic gas) levels in your blood stream will drop by half, and oxygen levels will return to normal. In 48 hours your chance of having a heart attack will have decreased. All nicotine will have left your body. Your sense of taste and smell will return to a normal level. In 72 hours  your bronchial tubes will relax, and your energy levels will increase. In 2 weeks your circulation will increase, and it will continue to improve for the next 10 weeks.    Recommendations for elective surgery - Ideally, patients should quit smoking 8 weeks before and at least 2 weeks after elective surgery in order to avoid complications. Simply cutting back on the amount of cigarettes smoked per day does not offer any benefit or decrease the risk of poor wound healing, heart problems, and infection. Smokers should also start taking Vitamin C and B for two weeks before surgery and two weeks after surgery.    Ways to Stop Smokin. Nicotine patches, lozenges, or gum  2. Support Groups  3. Medications (see below)    List of Medications:  1. Varenicline Tartrate (CHANTIX)   2. Bupropion HCL (WELLBUTRIN, ZYBAN) - note: make sure Wellbutrin is for smoking cessation and not other issues   3. Nicotine Patch (NICODERM)   4. Nicotine Inhaler (NICOTROL)   5. Nicotine Gum Nicotine Polacrilex   6. Nicotine Lozenge: Nicotine Polacrilex (COMMIT)   * [facility] offers a smoking support group as well!  Please visit: [email or web address]  If you are interested in these, ask about getting a prescription or talk to your primary care doctor about what may be the best way for you to quit.

## 2020-02-24 NOTE — PROGRESS NOTES
Fallon Pizano is a 54 year old female who presents for evaluation of neck and bilateral arm pain x 2-3 years. Pain is located in bilateral neck and radiates down bilateral arms to the hands. States right arm pain has resolved, but she currently still has left arm pain. Describes the pain as a jolt. She does get associated headaches as well, which are located in bilateral orbital region associated with light sensitivity. She does get associated paresthesias in her arms L>R. Pain is worsened with flexion. Pain is alleviated with physical therapy, but never fully alleviated the pain. She has been doing physical therapy, but feels as though the exercises she does at home cause the pain to worsen. No MRI. They had originally thought the statins she was on was cause of her pain, but pain has not improved with stopping of the statin. Denies bladder/bowel incontinence.    Returns for follow up.  Continued severe pain as described above.  Daily pain, worsened over the last 2 years.  Throbbing neck pain, severe headaches at base of skull, and radiating pain to left>right arms, second and third digits worse.  MR with severe disc degeneration, central and foraminal stenosis from C4-C7.  Extensive PT and JO[facility] JESUS without improvement.    Past Medical History:   Diagnosis Date     Acquired hypothyroidism 4/7/2017     Allergic rhinitis      CAD (coronary artery disease) 4/11    AMI, LAD stent     Cervicalgia 9/12/2019     Chest pain      Hyperlipidemia LDL goal <70 9/10/2012     Major depression     related to CAD     MI (myocardial infarction) (H) 04/2011     Mild episode of recurrent major depressive disorder (H) 9/10/2012     Myalgia 9/12/2019     Palpitations        Past Medical History reviewed with patient during visit.    Past Surgical History:   Procedure Laterality Date     ANGIOGRAM  4/11    proximal LAD stent     COLONOSCOPY N/A 11/22/2017    Procedure: COLONOSCOPY;  Colonoscopy ;  Surgeon: Jaylon Botello MD;   Location: [facility]     HC ENLARGE BREAST WITH IMPLANT       Past Surgical History reviewed with patient during visit.    Current Outpatient Medications   Medication     amLODIPine (NORVASC) 5 MG tablet     aspirin 81 MG tablet     carvedilol (COREG) 6.25 MG tablet     desvenlafaxine (PRISTIQ) 50 MG 24 hr tablet     DiphenhydrAMINE HCl (BENADRYL ALLERGY PO)     IBUPROFEN PO     levothyroxine (SYNTHROID/LEVOTHROID) 25 MCG tablet     levothyroxine (SYNTHROID/LEVOTHROID) 50 MCG tablet     lisinopril (PRINIVIL/ZESTRIL) 5 MG tablet     nitroGLYcerin (NITROSTAT) 0.4 MG sublingual tablet     rosuvastatin (CRESTOR) 40 MG tablet     zolpidem (AMBIEN) 5 MG tablet     escitalopram (LEXAPRO) 20 MG tablet     escitalopram (LEXAPRO) 20 MG tablet     fluticasone (FLONASE) 50 MCG/ACT nasal spray     traZODone (DESYREL) 50 MG tablet     No current facility-administered medications for this visit.        Allergies   Allergen Reactions     Atorvastatin Muscle Pain (Myalgia)     Ciprofloxacin Unknown     Reacted to Levofloxacin and was instructed to avoid all Flouroquinolones.  Other reaction(s): *Unknown       Flagyl [Metronidazole Hcl]      Doesn't remember the reaction     Levofloxacin Unknown     Instructed to avoid all fluoroquinolones  Other reaction(s): *Unknown       Levaquin Rash       Social History     Socioeconomic History     Marital status:      Spouse name: Not on file     Number of children: 1     Years of education: Not on file     Highest education level: Not on file   Occupational History     Employer: NONE    Social Needs     Financial resource strain: Not on file     Food insecurity:     Worry: Not on file     Inability: Not on file     Transportation needs:     Medical: Not on file     Non-medical: Not on file   Tobacco Use     Smoking status: Light Tobacco Smoker     Types: Cigarettes     Last attempt to quit: 2011     Years since quittin.5     Smokeless tobacco: Never Used     Tobacco comment: smokes  "occ   Substance and Sexual Activity     Alcohol use: Yes     Alcohol/week: 0.0 standard drinks     Comment: Casual     Drug use: No     Sexual activity: Yes     Partners: Male   Lifestyle     Physical activity:     Days per week: Not on file     Minutes per session: Not on file     Stress: Not on file   Relationships     Social connections:     Talks on phone: Not on file     Gets together: Not on file     Attends Quaker service: Not on file     Active member of club or organization: Not on file     Attends meetings of clubs or organizations: Not on file     Relationship status: Not on file     Intimate partner violence:     Fear of current or ex partner: Not on file     Emotionally abused: Not on file     Physically abused: Not on file     Forced sexual activity: Not on file   Other Topics Concern     Parent/sibling w/ CABG, MI or angioplasty before 65F 55M? Not Asked      Service Not Asked     Blood Transfusions Not Asked     Caffeine Concern Yes     Comment: 3+ daily     Occupational Exposure Not Asked     Hobby Hazards Not Asked     Sleep Concern No     Stress Concern No     Weight Concern Not Asked     Special Diet No     Comment: watches fats and sodium     Back Care Not Asked     Exercise No     Bike Helmet Not Asked     Seat Belt Not Asked     Self-Exams Not Asked   Social History Narrative     Not on file       Family History   Problem Relation Age of Onset     Diabetes Mother      C.A.D. Mother      Heart Disease Mother         valve disease, pacer     Connective Tissue Disorder Mother         autoimmune disease     Gastrointestinal Disease Mother         cirrhosis, non alcoholic     C.A.D. Father 55          ROS: 10 point ROS neg other than the symptoms noted above in the HPI.    Vital Signs: /71   Pulse 68   Temp 98  F (36.7  C)   Ht 1.626 m (5' 4\")   Wt 64.4 kg (142 lb)   LMP 07/18/2013   SpO2 98%   BMI 24.37 kg/m      Examination:  Constitutional:  Alert, well nourished, " NAD.  HEENT: Normocephalic, atraumatic.   Pulmonary:  Without shortness of breath, normal effort.   Lymph: no lymphadenopathy to low back or LE.   Integumentary: Skin is free of rashes or lesions.   Cardiovascular:  No pitting edema of BLE.      Neurological:  Awake  Alert  Oriented x 3  Speech clear  Cranial nerves II - XII grossly intact  PERRL  EOMI  Face symmetric  Tongue midline  Motor exam   Shoulder Abduction:  Right:  5/5   Left:  5/5  Biceps:                      Right:  5/5   Left:  5/5  Triceps:                     Right:  5/5   Left:  5/5  Wrist Extensors:       Right:  5/5   Left:  5/5  Wrist Flexors:           Right:  5/5   Left:  5/5  Intrinsics:                   Right:  5/5   Left:  5/5       Sensation normal to bilateral upper extremities.    Reflexes are 2+ in the brachial radialis and triceps. Negative Thelma sign bilaterally.  Musculoskeletal:  Gait: Able to stand from a seated position. Normal non-antalgic, non-myelopathic gait.  Able to heel/toe walk without loss of balance  Cervical examination reveals decreased range of motion in all planes.  No tenderness to palpation of the cervical spine or paraspinous muscles bilaterally.  +Tenderness to left deltoid muscle    Imaging:   XR of the cervical spine from 10/4/2019 was reviewed in the office today. Reveals severe degeneration at C4-5 and C5-6.     Assessment/Plan:   Fallon Pizano is a 54 year old female who presents for evaluation of neck and bilateral arm pain x 2-3 years. Pain is located in bilateral neck and radiates down bilateral arms to the hands    Will plan for C4-7 ACDF  Risks and benefits discussed

## 2020-02-24 NOTE — NURSING NOTE
Patient Education    Education included but not limited to:  - Surgical risks: blood clots, urinating difficulties, nerve damage, infection.  - Pre-operative physical with primary care physician within 30 days of surgical date.   - Pre-operative clearance from other pertaining specialties.   - Discontinue NSAIDS x 7 days prior to surgical date.   -Do not begin taking NSAIDs (Advil, Motrin, Ibuprofen, Nuprin, Diclofenac, Meloxicam, Aleve, Celebrex, Aspirin, etc.) until 6 weeks after surgery if you had a fusion. May cause bleeding and interfere with bone healing.    -May try Tylenol for pain.  -Smoking cessation (current smoker, discussed the importance of quitting 30 days prior to surgery.)  -Discussed being off work after surgery, short term disability, FMLA, etc.   -Forms to be completed    -Pre-op timeline: NPO, shower, medications    -Hospital stay: Checking in, surgery, recovery room, hospital room.    - Post operative pain management: narcotics, muscle relaxants, ice, etc.   -No driving while taking narcotics     -Post operative incision care:   Keep your incision clean and dry.   Okay to shower. No submerging in water until incision healed.   Watch for signs of infection and notify clinic if drainage or fever develops.   - Post operative activity limitations recommended until follow up appointment: no lifting > 10 pounds; limited bending, twisting, overhead reaching.  -If a brace is required per Dr. Arriaza, [facility] will fit you for the brace in the hospital.  - Follow up appointments: 6 week post op, 3 months post op, 6 months post op, 1 year post op. You will need to an xray before each appointment. Please call to schedule follow up appointment at [phone].   - Education book was also given to the patient for further review.      Patient verbalized understanding of above instructions. All questions were answered to the best of my ability and the patient's satisfaction. Patient advised to call with any  additional questions or concerns.

## 2020-02-24 NOTE — LETTER
2/24/2020         RE: Fallon Pizano  [address]  [facility] [address]        Dear Colleague,    Thank you for referring your patient, Fallon Pizano, to the [facility]. Please see a copy of my visit note below.    Fallon Pizano is a 54 year old female who presents for evaluation of neck and bilateral arm pain x 2-3 years. Pain is located in bilateral neck and radiates down bilateral arms to the hands. States right arm pain has resolved, but she currently still has left arm pain. Describes the pain as a jolt. She does get associated headaches as well, which are located in bilateral orbital region associated with light sensitivity. She does get associated paresthesias in her arms L>R. Pain is worsened with flexion. Pain is alleviated with physical therapy, but never fully alleviated the pain. She has been doing physical therapy, but feels as though the exercises she does at home cause the pain to worsen. No MRI. They had originally thought the statins she was on was cause of her pain, but pain has not improved with stopping of the statin. Denies bladder/bowel incontinence.    Returns for follow up.  Continued severe pain as described above.  Daily pain, worsened over the last 2 years.  Throbbing neck pain, severe headaches at base of skull, and radiating pain to left>right arms, second and third digits worse.  MR with severe disc degeneration, central and foraminal stenosis from C4-C7.  Extensive PT and [facility]US without improvement.    Past Medical History:   Diagnosis Date     Acquired hypothyroidism 4/7/2017     Allergic rhinitis      CAD (coronary artery disease) 4/11    AMI, LAD stent     Cervicalgia 9/12/2019     Chest pain      Hyperlipidemia LDL goal <70 9/10/2012     Major depression     related to CAD     MI (myocardial infarction) (H) 04/2011     Mild episode of recurrent major depressive disorder (H) 9/10/2012     Myalgia 9/12/2019      Palpitations        Past Medical History reviewed with patient during visit.    Past Surgical History:   Procedure Laterality Date     ANGIOGRAM  4/11    proximal LAD stent     COLONOSCOPY N/A 11/22/2017    Procedure: COLONOSCOPY;  Colonoscopy ;  Surgeon: Jaylon Botello MD;  Location: [facility]     HC ENLARGE BREAST WITH IMPLANT       Past Surgical History reviewed with patient during visit.    Current Outpatient Medications   Medication     amLODIPine (NORVASC) 5 MG tablet     aspirin 81 MG tablet     carvedilol (COREG) 6.25 MG tablet     desvenlafaxine (PRISTIQ) 50 MG 24 hr tablet     DiphenhydrAMINE HCl (BENADRYL ALLERGY PO)     IBUPROFEN PO     levothyroxine (SYNTHROID/LEVOTHROID) 25 MCG tablet     levothyroxine (SYNTHROID/LEVOTHROID) 50 MCG tablet     lisinopril (PRINIVIL/ZESTRIL) 5 MG tablet     nitroGLYcerin (NITROSTAT) 0.4 MG sublingual tablet     rosuvastatin (CRESTOR) 40 MG tablet     zolpidem (AMBIEN) 5 MG tablet     escitalopram (LEXAPRO) 20 MG tablet     escitalopram (LEXAPRO) 20 MG tablet     fluticasone (FLONASE) 50 MCG/ACT nasal spray     traZODone (DESYREL) 50 MG tablet     No current facility-administered medications for this visit.        Allergies   Allergen Reactions     Atorvastatin Muscle Pain (Myalgia)     Ciprofloxacin Unknown     Reacted to Levofloxacin and was instructed to avoid all Flouroquinolones.  Other reaction(s): *Unknown       Flagyl [Metronidazole Hcl]      Doesn't remember the reaction     Levofloxacin Unknown     Instructed to avoid all fluoroquinolones  Other reaction(s): *Unknown       Levaquin Rash       Social History     Socioeconomic History     Marital status:      Spouse name: Not on file     Number of children: 1     Years of education: Not on file     Highest education level: Not on file   Occupational History     Employer: NONE    Social Needs     Financial resource strain: Not on file     Food insecurity:     Worry: Not on file     Inability: Not on file      Transportation needs:     Medical: Not on file     Non-medical: Not on file   Tobacco Use     Smoking status: Light Tobacco Smoker     Types: Cigarettes     Last attempt to quit: 2011     Years since quittin.5     Smokeless tobacco: Never Used     Tobacco comment: smokes occ   Substance and Sexual Activity     Alcohol use: Yes     Alcohol/week: 0.0 standard drinks     Comment: Casual     Drug use: No     Sexual activity: Yes     Partners: Male   Lifestyle     Physical activity:     Days per week: Not on file     Minutes per session: Not on file     Stress: Not on file   Relationships     Social connections:     Talks on phone: Not on file     Gets together: Not on file     Attends [employer]an service: Not on file     Active member of club or organization: Not on file     Attends meetings of clubs or organizations: Not on file     Relationship status: Not on file     Intimate partner violence:     Fear of current or ex partner: Not on file     Emotionally abused: Not on file     Physically abused: Not on file     Forced sexual activity: Not on file   Other Topics Concern     Parent/sibling w/ CABG, MI or angioplasty before 65F 55M? Not Asked      Service Not Asked     Blood Transfusions Not Asked     Caffeine Concern Yes     Comment: 3+ daily     Occupational Exposure Not Asked     Hobby Hazards Not Asked     Sleep Concern No     Stress Concern No     Weight Concern Not Asked     Special Diet No     Comment: watches fats and sodium     Back Care Not Asked     Exercise No     Bike Helmet Not Asked     Seat Belt Not Asked     Self-Exams Not Asked   Social History Narrative     Not on file       Family History   Problem Relation Age of Onset     Diabetes Mother      C.A.D. Mother      Heart Disease Mother         valve disease, pacer     Connective Tissue Disorder Mother         autoimmune disease     Gastrointestinal Disease Mother         cirrhosis, non alcoholic     C.A.D. Father 55          ROS: 10  "point ROS neg other than the symptoms noted above in the HPI.    Vital Signs: /71   Pulse 68   Temp 98  F (36.7  C)   Ht 1.626 m (5' 4\")   Wt 64.4 kg (142 lb)   LMP 07/18/2013   SpO2 98%   BMI 24.37 kg/m       Examination:  Constitutional:  Alert, well nourished, NAD.  HEENT: Normocephalic, atraumatic.   Pulmonary:  Without shortness of breath, normal effort.   Lymph: no lymphadenopathy to low back or LE.   Integumentary: Skin is free of rashes or lesions.   Cardiovascular:  No pitting edema of BLE.      Neurological:  Awake  Alert  Oriented x 3  Speech clear  Cranial nerves II - XII grossly intact  PERRL  EOMI  Face symmetric  Tongue midline  Motor exam   Shoulder Abduction:  Right:  5/5   Left:  5/5  Biceps:                      Right:  5/5   Left:  5/5  Triceps:                     Right:  5/5   Left:  5/5  Wrist Extensors:       Right:  5/5   Left:  5/5  Wrist Flexors:           Right:  5/5   Left:  5/5  Intrinsics:                   Right:  5/5   Left:  5/5       Sensation normal to bilateral upper extremities.    Reflexes are 2+ in the brachial radialis and triceps. Negative Thelma sign bilaterally.  Musculoskeletal:  Gait: Able to stand from a seated position. Normal non-antalgic, non-myelopathic gait.  Able to heel/toe walk without loss of balance  Cervical examination reveals decreased range of motion in all planes.  No tenderness to palpation of the cervical spine or paraspinous muscles bilaterally.  +Tenderness to left deltoid muscle    Imaging:   XR of the cervical spine from 10/4/2019 was reviewed in the office today. Reveals severe degeneration at C4-5 and C5-6.     Assessment/Plan:   Fallon Pizano is a 54 year old female who presents for evaluation of neck and bilateral arm pain x 2-3 years. Pain is located in bilateral neck and radiates down bilateral arms to the hands    Will plan for C4-7 ACDF  Risks and benefits discussed      Again, thank you for allowing me to " participate in the care of your patient.        Sincerely,        Abdulkadir Arriaza MD

## 2020-02-24 NOTE — NURSING NOTE
"Fallon Pizano is a 54 year old female who presents for:  Chief Complaint   Patient presents with     Neurologic Problem     Discuss surgical options / injection         Initial Vitals:  /71   Pulse 68   Temp 98  F (36.7  C)   Ht 5' 4\" (1.626 m)   Wt 142 lb (64.4 kg)   LMP 07/18/2013   SpO2 98%   BMI 24.37 kg/m   Estimated body mass index is 24.37 kg/m  as calculated from the following:    Height as of this encounter: 5' 4\" (1.626 m).    Weight as of this encounter: 142 lb (64.4 kg).. Body surface area is 1.71 meters squared. BP completed using cuff size: regular  Moderate Pain (5)    Nursing Comments: Patient presents with neck pain level 5 and has a headache    Corey Bernal MA  "

## 2020-03-02 ENCOUNTER — HEALTH MAINTENANCE LETTER (OUTPATIENT)
Age: 55
End: 2020-03-02

## 2020-03-09 ENCOUNTER — OFFICE VISIT (OUTPATIENT)
Dept: INTERNAL MEDICINE | Facility: CLINIC | Age: 55
End: 2020-03-09
Payer: COMMERCIAL

## 2020-03-09 VITALS
HEIGHT: 64 IN | BODY MASS INDEX: 24.17 KG/M2 | SYSTOLIC BLOOD PRESSURE: 118 MMHG | HEART RATE: 89 BPM | WEIGHT: 141.6 LBS | TEMPERATURE: 98 F | DIASTOLIC BLOOD PRESSURE: 80 MMHG | RESPIRATION RATE: 16 BRPM | OXYGEN SATURATION: 96 %

## 2020-03-09 DIAGNOSIS — M48.02 SPINAL STENOSIS IN CERVICAL REGION: ICD-10-CM

## 2020-03-09 DIAGNOSIS — R73.09 ABNORMAL BLOOD SUGAR: ICD-10-CM

## 2020-03-09 DIAGNOSIS — Z01.818 PRE-OP EXAM: Primary | ICD-10-CM

## 2020-03-09 DIAGNOSIS — E78.2 MIXED HYPERLIPIDEMIA: ICD-10-CM

## 2020-03-09 LAB
ERYTHROCYTE [DISTWIDTH] IN BLOOD BY AUTOMATED COUNT: 13.2 % (ref 10–15)
HCT VFR BLD AUTO: 47 % (ref 35–47)
HGB BLD-MCNC: 15.1 G/DL (ref 11.7–15.7)
MCH RBC QN AUTO: 30 PG (ref 26.5–33)
MCHC RBC AUTO-ENTMCNC: 32.1 G/DL (ref 31.5–36.5)
MCV RBC AUTO: 93 FL (ref 78–100)
PLATELET # BLD AUTO: 213 10E9/L (ref 150–450)
RBC # BLD AUTO: 5.04 10E12/L (ref 3.8–5.2)
WBC # BLD AUTO: 4.9 10E9/L (ref 4–11)

## 2020-03-09 PROCEDURE — 99215 OFFICE O/P EST HI 40 MIN: CPT | Performed by: INTERNAL MEDICINE

## 2020-03-09 PROCEDURE — 80048 BASIC METABOLIC PNL TOTAL CA: CPT | Performed by: INTERNAL MEDICINE

## 2020-03-09 PROCEDURE — 36415 COLL VENOUS BLD VENIPUNCTURE: CPT | Performed by: INTERNAL MEDICINE

## 2020-03-09 PROCEDURE — 93000 ELECTROCARDIOGRAM COMPLETE: CPT | Performed by: INTERNAL MEDICINE

## 2020-03-09 PROCEDURE — 85027 COMPLETE CBC AUTOMATED: CPT | Performed by: INTERNAL MEDICINE

## 2020-03-09 PROCEDURE — 80061 LIPID PANEL: CPT | Performed by: INTERNAL MEDICINE

## 2020-03-09 ASSESSMENT — MIFFLIN-ST. JEOR: SCORE: 1222.29

## 2020-03-09 NOTE — PATIENT INSTRUCTIONS
Do not take  the lisinopril the day before or the day of surgery.    Take the amlodipine and the carvedilol the morning of surgery when he first get up with sips of water.

## 2020-03-09 NOTE — NURSING NOTE
"/80 (BP Location: Left arm, Patient Position: Sitting, Cuff Size: Adult Regular)   Pulse 89   Temp 98  F (36.7  C) (Oral)   Resp 16   Ht 1.626 m (5' 4\")   Wt 64.2 kg (141 lb 9.6 oz)   LMP 07/18/2013   SpO2 96%   BMI 24.31 kg/m      "

## 2020-03-09 NOTE — PROGRESS NOTES
[facility]  [address]  [facility] [address]  [phone]  Dept: [phone]    PRE-OP EVALUATION:  Today's date: 3/9/2020    Fallon Pizano (: 1965) presents for pre-operative evaluation assessment as requested by Dr. Abdulkadir Arriaza.  She requires evaluation and anesthesia risk assessment prior to undergoing surgery/procedure for treatment of degenerative disk disease of cervical spine  Fax number for surgical facility: [facility]  Primary Physician: Gabriella Carter  Type of Anesthesia Anticipated: General    Patient has a Health Care Directive or Living Will:  NO    Preop Questions 3/5/2020   Who is doing your surgery? Dr. Abdulkadir Arriaza   What are you having done? Cervical Spinal Fusion   Date of Surgery/Procedure: 2020   Facility or Hospital where procedure/surgery will be performed: [facility]   1.  Do you have a history of Heart attack, stroke, stent, coronary bypass surgery, or other heart surgery? YES  - CAD with stent   2.  Do you ever have any pain or discomfort in your chest? No   3.  Do you have a history of  Heart Failure? No   4.   Are you troubled by shortness of breath when:  walking on a level surface, or up a slight hill, or at night? No   5.  Do you currently have a cold, bronchitis or other respiratory infection? No   6.  Do you have a cough, shortness of breath, or wheezing? No   7.  Do you sometimes get pains in the calves of your legs when you walk? No   8. Do you or anyone in your family have previous history of blood clots? no   9.  Do you or does anyone in your family have a serious bleeding problem such as prolonged bleeding following surgeries or cuts? No   10. Have you ever had problems with anemia or been told to take iron pills? YES - many years ago   11. Have you had any abnormal blood loss such as black, tarry or bloody stools, or abnormal vaginal bleeding? No   12. Have you ever had a blood  transfusion? No   13. Have you or any of your relatives ever had problems with anesthesia? No   14. Do you have sleep apnea, excessive snoring or daytime drowsiness? No   15. Do you have any prosthetic heart valves? No   16. Do you have prosthetic joints? No   17. Is there any chance that you may be pregnant? No         HPI:     HPI related to upcoming procedure: she has cervical degenerative changes and spinal stenosis, not managed by conservative tretment      See problem list for active medical problems.  Problems all longstanding and stable, except as noted/documented.  See ROS for pertinent symptoms related to these conditions.      MEDICAL HISTORY:     Patient Active Problem List    Diagnosis Date Noted     Spinal stenosis in cervical region 02/24/2020     Priority: Medium     Added automatically from request for surgery [id number]       Myalgia 09/12/2019     Priority: Medium     Acquired hypothyroidism 04/07/2017     Priority: Medium     Abnormal blood sugar 01/23/2016     Priority: Medium     Hyperlipidemia LDL goal <70 09/10/2012     Priority: Medium     Mild episode of recurrent major depressive disorder (H) 09/10/2012     Priority: Medium     CAD (coronary artery disease)      Priority: Medium     AMI, LAD stent       Allergic rhinitis      Priority: Medium      Past Medical History:   Diagnosis Date     Acquired hypothyroidism 4/7/2017     Allergic rhinitis      CAD (coronary artery disease) 4/11    AMI, LAD stent     Cervicalgia 9/12/2019     Chest pain      Hyperlipidemia LDL goal <70 9/10/2012     Major depression     related to CAD     MI (myocardial infarction) (H) 04/2011     Mild episode of recurrent major depressive disorder (H) 9/10/2012     Myalgia 9/12/2019     Palpitations      Past Surgical History:   Procedure Laterality Date     ANGIOGRAM  4/11    proximal LAD stent     COLONOSCOPY N/A 11/22/2017    Procedure: COLONOSCOPY;  Colonoscopy ;  Surgeon: Jaylon Botello MD;  Location:  GI      HC ENLARGE BREAST WITH IMPLANT       Current Outpatient Medications   Medication Sig Dispense Refill     amLODIPine (NORVASC) 5 MG tablet TAKE 1 TABLET BY MOUTH EVERY DAY 90 tablet 2     aspirin 81 MG tablet Take 1 tablet by mouth daily. 90 tablet 3     carvedilol (COREG) 6.25 MG tablet TAKE 1 TABLET (6.25 MG) BY MOUTH 2 TIMES DAILY WITH MEALS 180 tablet 2     desvenlafaxine (PRISTIQ) 50 MG 24 hr tablet Take 1 tablet (50 mg) by mouth daily 90 tablet 1     DiphenhydrAMINE HCl (BENADRYL ALLERGY PO)        fluticasone (FLONASE) 50 MCG/ACT nasal spray INHALE 1-2 SPRAYS INTO BOTH NOSTRILS DAILY (Patient not taking: Reported on 10/25/2019) 16 mL 1     IBUPROFEN PO Take 200 mg by mouth       levothyroxine (SYNTHROID/LEVOTHROID) 25 MCG tablet TAKE 1 TABLET BY MOUTH EVERY OTHER DAY ALTERNATING WITH 50 MCG TABLETS 45 tablet 3     levothyroxine (SYNTHROID/LEVOTHROID) 50 MCG tablet TAKE 1 TABLET BY MOUTH EVERY other day alternating with 25 mcg 45 tablet 2     lisinopril (PRINIVIL/ZESTRIL) 5 MG tablet TAKE 1 TABLET BY MOUTH EVERY DAY 90 tablet 1     nitroGLYcerin (NITROSTAT) 0.4 MG sublingual tablet PLACE 1 TABLET UNDER THE TONGUE EVERY 5 MINUTES AS NEEDED FOR CHEST PAIN 25 tablet 0     rosuvastatin (CRESTOR) 40 MG tablet TAKE 1 TABLET BY MOUTH EVERY DAY 30 tablet 5     traZODone (DESYREL) 50 MG tablet Take 2 tablets (100 mg) by mouth At Bedtime (Patient not taking: Reported on 10/25/2019) 180 tablet 1     zolpidem (AMBIEN) 5 MG tablet Take 1 tablet (5 mg) by mouth nightly as needed for sleep 30 tablet 1     OTC products: None, except as noted above    Allergies   Allergen Reactions     Atorvastatin Muscle Pain (Myalgia)     Ciprofloxacin Unknown     Reacted to Levofloxacin and was instructed to avoid all Flouroquinolones.  Other reaction(s): *Unknown       Flagyl [Metronidazole Hcl]      Doesn't remember the reaction     Levofloxacin Unknown     Instructed to avoid all fluoroquinolones  Other reaction(s): *Unknown        "Levaquin Rash      Latex Allergy: NO    Social History     Tobacco Use     Smoking status: Light Tobacco Smoker     Types: Cigarettes     Last attempt to quit: 2011     Years since quittin.9     Smokeless tobacco: Never Used     Tobacco comment: smokes occ   Substance Use Topics     Alcohol use: Yes     Alcohol/week: 0.0 standard drinks     Comment: Casual     History   Drug Use No       REVIEW OF SYSTEMS:   GENERAL: negative for, fever, chills, weight loss, weight gain  EYES: negative  ENT: negative  RESPIRATORY: No dyspnea on exertion and No cough  CARDIOVASCULAR: negative for, palpitations, tachycardia, irregular heart beat and chest pain  GI: negative for, nausea, vomiting, abdominal pain, melena and hematochezia  : negative for, dysuria and hematuria  MUSCULOSKELETAL: neck pain  NEUROLOGIC: positive for headaches and numbness or tingling of hands, negative for, seizures, local weakness and numbness or tingling of feet  SKIN: negative  ENDOCRINE: negative         EXAM:       Patient is alert, oriented, cooperative in no acute distress.  /80 (BP Location: Left arm, Patient Position: Sitting, Cuff Size: Adult Regular)   Pulse 89   Temp 98  F (36.7  C) (Oral)   Resp 16   Ht 1.626 m (5' 4\")   Wt 64.2 kg (141 lb 9.6 oz)   LMP 2013   SpO2 96%   BMI 24.31 kg/m      HEENT: PERRL, EOMI, TM's are normal. Oropharynx is clear.  NECK: No lymphadenopathy or thyromegaly. Carotid pulses full without bruits.  LUNGS: clear  CV: normal S1, S2 without murmur, S3 or S4 present. Pulses are 2/2 throughout. No JVD.  ABDOMEN: Bowel sounds present, nontender without hepatosplenomegaly. Liver is normal size to percussion.  EXTREMITIES: no edema present, unremarkable joints  NEUROLOGIC: Cranial nerves II-XII intact, reflexes 2/4 throughout, strength 5/5, sensation grossly intact, gait normal.  SKIN: without rashes or significant lesions     DIAGNOSTICS:   EKG: Sinus bradycardia, normal axis, normal intervals, " no acute ST/T changes c/w ischemia, no LVH by voltage criteria, unchanged from previous tracings  Lab: see [facility]    Recent Labs   Lab Test 10/04/19  0829 09/18/18  0930 04/09/18  1546   HGB  --  14.1 13.9   PLT  --  266 230    141 141   POTASSIUM 4.1 4.2 4.5   CR 0.67 0.74 0.83   A1C 5.7* 6.0*  --         IMPRESSION:   Reason for surgery/procedure: Cervical disc disease  Diagnosis/reason for consult: Preop    The proposed surgical procedure is considered INTERMEDIATE risk.    REVISED CARDIAC RISK INDEX  The patient has the following serious cardiovascular risks for perioperative complications such as (MI, PE, VFib and 3  AV Block):  Coronary Artery Disease (MI, positive stress test, angina, Qs on EKG)  INTERPRETATION: 1 risks: Class II (low risk - 0.9% complication rate)    The patient has the following additional risks for perioperative complications:  No identified additional risks      ICD-10-CM    1. Pre-op exam  Z01.818 EKG 12-lead complete w/read - Clinics     CBC with platelets     Basic metabolic panel  (Ca, Cl, CO2, Creat, Gluc, K, Na, BUN)   2. Spinal stenosis in cervical region  M48.02    3. Abnormal blood sugar  R73.09 Basic metabolic panel  (Ca, Cl, CO2, Creat, Gluc, K, Na, BUN)   4. Mixed hyperlipidemia  E78.2 Lipid Profile       RECOMMENDATIONS:     --Consult hospital rounder / IM to assist post-op medical management    --Patient is to take all scheduled medications on the day of surgery EXCEPT for modifications listed below.    ACE Inhibitor or Angiotensin Receptor Blocker (ARB) Use  Ace inhibitor or Angiotensin Receptor Blocker (ARB) and should HOLD this medication for the 24 hours prior to surgery.      APPROVAL GIVEN to proceed with proposed procedure, without further diagnostic evaluation       Signed Electronically by: Gabriella Carter MD    Copy of this evaluation report is provided to requesting physician.    [facility] Preop Guidelines    Revised Cardiac Risk Index

## 2020-03-10 LAB
ANION GAP SERPL CALCULATED.3IONS-SCNC: 5 MMOL/L (ref 3–14)
BUN SERPL-MCNC: 12 MG/DL (ref 7–30)
CALCIUM SERPL-MCNC: 9.3 MG/DL (ref 8.5–10.1)
CHLORIDE SERPL-SCNC: 110 MMOL/L (ref 94–109)
CHOLEST SERPL-MCNC: 162 MG/DL
CO2 SERPL-SCNC: 25 MMOL/L (ref 20–32)
CREAT SERPL-MCNC: 0.68 MG/DL (ref 0.52–1.04)
GFR SERPL CREATININE-BSD FRML MDRD: >90 ML/MIN/{1.73_M2}
GLUCOSE SERPL-MCNC: 111 MG/DL (ref 70–99)
HDLC SERPL-MCNC: 55 MG/DL
LDLC SERPL CALC-MCNC: 74 MG/DL
NONHDLC SERPL-MCNC: 107 MG/DL
POTASSIUM SERPL-SCNC: 4.6 MMOL/L (ref 3.4–5.3)
SODIUM SERPL-SCNC: 140 MMOL/L (ref 133–144)
TRIGL SERPL-MCNC: 166 MG/DL

## 2020-03-15 ENCOUNTER — MYC MEDICAL ADVICE (OUTPATIENT)
Dept: INTERNAL MEDICINE | Facility: CLINIC | Age: 55
End: 2020-03-15

## 2020-03-24 ENCOUNTER — ANESTHESIA (OUTPATIENT)
Dept: SURGERY | Facility: CLINIC | Age: 55
End: 2020-03-24
Payer: COMMERCIAL

## 2020-03-24 ENCOUNTER — ANESTHESIA EVENT (OUTPATIENT)
Dept: SURGERY | Facility: CLINIC | Age: 55
End: 2020-03-24
Payer: COMMERCIAL

## 2020-03-24 ENCOUNTER — APPOINTMENT (OUTPATIENT)
Dept: GENERAL RADIOLOGY | Facility: CLINIC | Age: 55
End: 2020-03-24
Attending: NEUROLOGICAL SURGERY
Payer: COMMERCIAL

## 2020-03-24 ENCOUNTER — HOSPITAL ENCOUNTER (OUTPATIENT)
Facility: CLINIC | Age: 55
Discharge: HOME | End: 2020-03-25
Attending: NEUROLOGICAL SURGERY | Admitting: NEUROLOGICAL SURGERY
Payer: COMMERCIAL

## 2020-03-24 DIAGNOSIS — Z98.1 S/P CERVICAL SPINAL FUSION: Primary | ICD-10-CM

## 2020-03-24 DIAGNOSIS — M48.02 SPINAL STENOSIS IN CERVICAL REGION: ICD-10-CM

## 2020-03-24 PROCEDURE — 25000128 H RX IP 250 OP 636: Performed by: PHYSICIAN ASSISTANT

## 2020-03-24 PROCEDURE — C1762 CONN TISS, HUMAN(INC FASCIA): HCPCS | Performed by: NEUROLOGICAL SURGERY

## 2020-03-24 PROCEDURE — L1499 SPINAL ORTHOSIS NOS: HCPCS

## 2020-03-24 PROCEDURE — 25800030 ZZH RX IP 258 OP 636: Performed by: NURSE ANESTHETIST, CERTIFIED REGISTERED

## 2020-03-24 PROCEDURE — 71000012 ZZH RECOVERY PHASE 1 LEVEL 1 FIRST HR: Performed by: NEUROLOGICAL SURGERY

## 2020-03-24 PROCEDURE — 36000069 ZZH SURGERY LEVEL 5 EA 15 ADDTL MIN: Performed by: NEUROLOGICAL SURGERY

## 2020-03-24 PROCEDURE — 25000132 ZZH RX MED GY IP 250 OP 250 PS 637: Performed by: PHYSICIAN ASSISTANT

## 2020-03-24 PROCEDURE — 25000132 ZZH RX MED GY IP 250 OP 250 PS 637: Performed by: NURSE PRACTITIONER

## 2020-03-24 PROCEDURE — 22552 ARTHRD ANT NTRBD CERVICAL EA: CPT | Mod: AS | Performed by: NURSE PRACTITIONER

## 2020-03-24 PROCEDURE — 40000277 XR SURGERY CARM FLUORO LESS THAN 5 MIN W STILLS

## 2020-03-24 PROCEDURE — 25800030 ZZH RX IP 258 OP 636: Performed by: NURSE PRACTITIONER

## 2020-03-24 PROCEDURE — 25000128 H RX IP 250 OP 636: Performed by: ANESTHESIOLOGY

## 2020-03-24 PROCEDURE — 25000566 ZZH SEVOFLURANE, EA 15 MIN: Performed by: NEUROLOGICAL SURGERY

## 2020-03-24 PROCEDURE — 27210794 ZZH OR GENERAL SUPPLY STERILE: Performed by: NEUROLOGICAL SURGERY

## 2020-03-24 PROCEDURE — 22551 ARTHRD ANT NTRBDY CERVICAL: CPT | Mod: AS | Performed by: NURSE PRACTITIONER

## 2020-03-24 PROCEDURE — C1713 ANCHOR/SCREW BN/BN,TIS/BN: HCPCS | Performed by: NEUROLOGICAL SURGERY

## 2020-03-24 PROCEDURE — 22846 INSERT SPINE FIXATION DEVICE: CPT | Mod: AS | Performed by: NURSE PRACTITIONER

## 2020-03-24 PROCEDURE — 37000008 ZZH ANESTHESIA TECHNICAL FEE, 1ST 30 MIN: Performed by: NEUROLOGICAL SURGERY

## 2020-03-24 PROCEDURE — 37000009 ZZH ANESTHESIA TECHNICAL FEE, EACH ADDTL 15 MIN: Performed by: NEUROLOGICAL SURGERY

## 2020-03-24 PROCEDURE — 36000071 ZZH SURGERY LEVEL 5 W FLUORO 1ST 30 MIN: Performed by: NEUROLOGICAL SURGERY

## 2020-03-24 PROCEDURE — 25000128 H RX IP 250 OP 636: Performed by: NURSE PRACTITIONER

## 2020-03-24 PROCEDURE — 22853 INSJ BIOMECHANICAL DEVICE: CPT | Mod: AS | Performed by: NURSE PRACTITIONER

## 2020-03-24 PROCEDURE — 71000013 ZZH RECOVERY PHASE 1 LEVEL 1 EA ADDTL HR: Performed by: NEUROLOGICAL SURGERY

## 2020-03-24 PROCEDURE — 25000125 ZZHC RX 250: Performed by: NURSE ANESTHETIST, CERTIFIED REGISTERED

## 2020-03-24 PROCEDURE — 22846 INSERT SPINE FIXATION DEVICE: CPT | Mod: 59 | Performed by: NEUROLOGICAL SURGERY

## 2020-03-24 PROCEDURE — 25000125 ZZHC RX 250: Performed by: NEUROLOGICAL SURGERY

## 2020-03-24 PROCEDURE — 25000128 H RX IP 250 OP 636: Performed by: NURSE ANESTHETIST, CERTIFIED REGISTERED

## 2020-03-24 PROCEDURE — 25000132 ZZH RX MED GY IP 250 OP 250 PS 637: Performed by: NEUROLOGICAL SURGERY

## 2020-03-24 PROCEDURE — L0172 CERV COL SR FOAM 2PC PRE OTS: HCPCS

## 2020-03-24 PROCEDURE — 25000301 ZZH OR RX SURGIFLO W/THROMBIN KIT 2ML 1991 OPNP: Performed by: NEUROLOGICAL SURGERY

## 2020-03-24 PROCEDURE — 22853 INSJ BIOMECHANICAL DEVICE: CPT | Performed by: NEUROLOGICAL SURGERY

## 2020-03-24 PROCEDURE — 22552 ARTHRD ANT NTRBD CERVICAL EA: CPT | Performed by: NEUROLOGICAL SURGERY

## 2020-03-24 PROCEDURE — 25000128 H RX IP 250 OP 636: Performed by: NEUROLOGICAL SURGERY

## 2020-03-24 PROCEDURE — 22551 ARTHRD ANT NTRBDY CERVICAL: CPT | Performed by: NEUROLOGICAL SURGERY

## 2020-03-24 PROCEDURE — 40000170 ZZH STATISTIC PRE-PROCEDURE ASSESSMENT II: Performed by: NEUROLOGICAL SURGERY

## 2020-03-24 DEVICE — IMP SCR MEDT ZEVO 3.5X15MM ST VA 7723515: Type: IMPLANTABLE DEVICE | Site: SPINE CERVICAL | Status: FUNCTIONAL

## 2020-03-24 DEVICE — GRAFT BONE PUTTY DBX 01ML 038010: Type: IMPLANTABLE DEVICE | Site: SPINE CERVICAL | Status: FUNCTIONAL

## 2020-03-24 DEVICE — IMP SCR MEDT ZEVO 3.5X17MM ST VA 7723517: Type: IMPLANTABLE DEVICE | Site: SPINE CERVICAL | Status: FUNCTIONAL

## 2020-03-24 DEVICE — IMP PLATE CERV MEDT ZEVO 57MM 3 LVL 3003057: Type: IMPLANTABLE DEVICE | Site: SPINE CERVICAL | Status: FUNCTIONAL

## 2020-03-24 DEVICE — GRAFT BONE PUTTY DBX 0.5ML 038005: Type: IMPLANTABLE DEVICE | Site: SPINE CERVICAL | Status: FUNCTIONAL

## 2020-03-24 RX ORDER — PROPOFOL 10 MG/ML
INJECTION, EMULSION INTRAVENOUS PRN
Status: DISCONTINUED | OUTPATIENT
Start: 2020-03-24 | End: 2020-03-24

## 2020-03-24 RX ORDER — SODIUM CHLORIDE, SODIUM LACTATE, POTASSIUM CHLORIDE, CALCIUM CHLORIDE 600; 310; 30; 20 MG/100ML; MG/100ML; MG/100ML; MG/100ML
INJECTION, SOLUTION INTRAVENOUS CONTINUOUS
Status: DISCONTINUED | OUTPATIENT
Start: 2020-03-24 | End: 2020-03-24 | Stop reason: HOSPADM

## 2020-03-24 RX ORDER — HYDROMORPHONE HYDROCHLORIDE 1 MG/ML
.3-.5 INJECTION, SOLUTION INTRAMUSCULAR; INTRAVENOUS; SUBCUTANEOUS EVERY 5 MIN PRN
Status: DISCONTINUED | OUTPATIENT
Start: 2020-03-24 | End: 2020-03-24 | Stop reason: HOSPADM

## 2020-03-24 RX ORDER — ONDANSETRON 2 MG/ML
4 INJECTION INTRAMUSCULAR; INTRAVENOUS EVERY 6 HOURS PRN
Status: DISCONTINUED | OUTPATIENT
Start: 2020-03-24 | End: 2020-03-25 | Stop reason: HOSPADM

## 2020-03-24 RX ORDER — ONDANSETRON 4 MG/1
4 TABLET, ORALLY DISINTEGRATING ORAL EVERY 30 MIN PRN
Status: DISCONTINUED | OUTPATIENT
Start: 2020-03-24 | End: 2020-03-24 | Stop reason: HOSPADM

## 2020-03-24 RX ORDER — BUPIVACAINE HYDROCHLORIDE AND EPINEPHRINE 5; 5 MG/ML; UG/ML
INJECTION, SOLUTION PERINEURAL PRN
Status: DISCONTINUED | OUTPATIENT
Start: 2020-03-24 | End: 2020-03-24 | Stop reason: HOSPADM

## 2020-03-24 RX ORDER — METHOCARBAMOL 750 MG/1
750 TABLET, FILM COATED ORAL 4 TIMES DAILY
Status: DISCONTINUED | OUTPATIENT
Start: 2020-03-24 | End: 2020-03-25 | Stop reason: HOSPADM

## 2020-03-24 RX ORDER — ONDANSETRON 4 MG/1
4 TABLET, ORALLY DISINTEGRATING ORAL EVERY 6 HOURS PRN
Status: DISCONTINUED | OUTPATIENT
Start: 2020-03-24 | End: 2020-03-25 | Stop reason: HOSPADM

## 2020-03-24 RX ORDER — HYDROMORPHONE HYDROCHLORIDE 1 MG/ML
0.2 INJECTION, SOLUTION INTRAMUSCULAR; INTRAVENOUS; SUBCUTANEOUS
Status: DISCONTINUED | OUTPATIENT
Start: 2020-03-24 | End: 2020-03-25 | Stop reason: HOSPADM

## 2020-03-24 RX ORDER — OXYCODONE HYDROCHLORIDE 5 MG/1
5-10 TABLET ORAL EVERY 4 HOURS PRN
Qty: 40 TABLET | Refills: 0 | Status: SHIPPED | OUTPATIENT
Start: 2020-03-24 | End: 2020-03-25

## 2020-03-24 RX ORDER — DIMENHYDRINATE 50 MG/ML
12.5 INJECTION, SOLUTION INTRAMUSCULAR; INTRAVENOUS
Status: DISCONTINUED | OUTPATIENT
Start: 2020-03-24 | End: 2020-03-24 | Stop reason: HOSPADM

## 2020-03-24 RX ORDER — SODIUM CHLORIDE, SODIUM LACTATE, POTASSIUM CHLORIDE, CALCIUM CHLORIDE 600; 310; 30; 20 MG/100ML; MG/100ML; MG/100ML; MG/100ML
INJECTION, SOLUTION INTRAVENOUS CONTINUOUS PRN
Status: DISCONTINUED | OUTPATIENT
Start: 2020-03-24 | End: 2020-03-24

## 2020-03-24 RX ORDER — CARVEDILOL 6.25 MG/1
6.25 TABLET ORAL 2 TIMES DAILY WITH MEALS
Status: DISCONTINUED | OUTPATIENT
Start: 2020-03-24 | End: 2020-03-25 | Stop reason: HOSPADM

## 2020-03-24 RX ORDER — NALOXONE HYDROCHLORIDE 0.4 MG/ML
.1-.4 INJECTION, SOLUTION INTRAMUSCULAR; INTRAVENOUS; SUBCUTANEOUS
Status: DISCONTINUED | OUTPATIENT
Start: 2020-03-24 | End: 2020-03-25 | Stop reason: HOSPADM

## 2020-03-24 RX ORDER — FENTANYL CITRATE 50 UG/ML
25-50 INJECTION, SOLUTION INTRAMUSCULAR; INTRAVENOUS
Status: DISCONTINUED | OUTPATIENT
Start: 2020-03-24 | End: 2020-03-24 | Stop reason: HOSPADM

## 2020-03-24 RX ORDER — PROPOFOL 10 MG/ML
INJECTION, EMULSION INTRAVENOUS CONTINUOUS PRN
Status: DISCONTINUED | OUTPATIENT
Start: 2020-03-24 | End: 2020-03-24

## 2020-03-24 RX ORDER — FENTANYL CITRATE 50 UG/ML
50 INJECTION, SOLUTION INTRAMUSCULAR; INTRAVENOUS
Status: DISCONTINUED | OUTPATIENT
Start: 2020-03-24 | End: 2020-03-24 | Stop reason: HOSPADM

## 2020-03-24 RX ORDER — HYDROXYZINE HYDROCHLORIDE 25 MG/1
25 TABLET, FILM COATED ORAL EVERY 6 HOURS PRN
Status: DISCONTINUED | OUTPATIENT
Start: 2020-03-24 | End: 2020-03-25 | Stop reason: HOSPADM

## 2020-03-24 RX ORDER — OXYCODONE HYDROCHLORIDE 5 MG/1
5-10 TABLET ORAL
Status: DISCONTINUED | OUTPATIENT
Start: 2020-03-24 | End: 2020-03-25 | Stop reason: HOSPADM

## 2020-03-24 RX ORDER — CEFAZOLIN SODIUM 1 G/3ML
1 INJECTION, POWDER, FOR SOLUTION INTRAMUSCULAR; INTRAVENOUS SEE ADMIN INSTRUCTIONS
Status: DISCONTINUED | OUTPATIENT
Start: 2020-03-24 | End: 2020-03-24 | Stop reason: HOSPADM

## 2020-03-24 RX ORDER — GABAPENTIN 300 MG/1
300 CAPSULE ORAL
Status: COMPLETED | OUTPATIENT
Start: 2020-03-24 | End: 2020-03-24

## 2020-03-24 RX ORDER — ACETAMINOPHEN 325 MG/1
975 TABLET ORAL ONCE
Status: COMPLETED | OUTPATIENT
Start: 2020-03-24 | End: 2020-03-24

## 2020-03-24 RX ORDER — METHOCARBAMOL 750 MG/1
750 TABLET, FILM COATED ORAL EVERY 6 HOURS PRN
Qty: 40 TABLET | Refills: 0 | Status: SHIPPED | OUTPATIENT
Start: 2020-03-24 | End: 2020-03-25

## 2020-03-24 RX ORDER — ACETAMINOPHEN 325 MG/1
325-650 TABLET ORAL EVERY 4 HOURS PRN
Status: DISCONTINUED | OUTPATIENT
Start: 2020-03-24 | End: 2020-03-25 | Stop reason: HOSPADM

## 2020-03-24 RX ORDER — CEFAZOLIN SODIUM 1 G/3ML
1 INJECTION, POWDER, FOR SOLUTION INTRAMUSCULAR; INTRAVENOUS EVERY 8 HOURS
Status: DISCONTINUED | OUTPATIENT
Start: 2020-03-24 | End: 2020-03-25 | Stop reason: HOSPADM

## 2020-03-24 RX ORDER — LEVOTHYROXINE SODIUM 25 UG/1
25 TABLET ORAL EVERY OTHER DAY
Status: DISCONTINUED | OUTPATIENT
Start: 2020-03-24 | End: 2020-03-25 | Stop reason: HOSPADM

## 2020-03-24 RX ORDER — METOCLOPRAMIDE 5 MG/1
10 TABLET ORAL EVERY 6 HOURS PRN
Status: DISCONTINUED | OUTPATIENT
Start: 2020-03-24 | End: 2020-03-25 | Stop reason: HOSPADM

## 2020-03-24 RX ORDER — ROSUVASTATIN CALCIUM 20 MG/1
40 TABLET, COATED ORAL DAILY
Status: DISCONTINUED | OUTPATIENT
Start: 2020-03-24 | End: 2020-03-25 | Stop reason: HOSPADM

## 2020-03-24 RX ORDER — DESVENLAFAXINE 50 MG/1
50 TABLET, FILM COATED, EXTENDED RELEASE ORAL DAILY
Status: DISCONTINUED | OUTPATIENT
Start: 2020-03-24 | End: 2020-03-25 | Stop reason: HOSPADM

## 2020-03-24 RX ORDER — LIDOCAINE HYDROCHLORIDE 20 MG/ML
INJECTION, SOLUTION INFILTRATION; PERINEURAL PRN
Status: DISCONTINUED | OUTPATIENT
Start: 2020-03-24 | End: 2020-03-24

## 2020-03-24 RX ORDER — GLYCOPYRROLATE 0.2 MG/ML
INJECTION, SOLUTION INTRAMUSCULAR; INTRAVENOUS PRN
Status: DISCONTINUED | OUTPATIENT
Start: 2020-03-24 | End: 2020-03-24

## 2020-03-24 RX ORDER — CEFAZOLIN SODIUM 2 G/100ML
2 INJECTION, SOLUTION INTRAVENOUS
Status: COMPLETED | OUTPATIENT
Start: 2020-03-24 | End: 2020-03-24

## 2020-03-24 RX ORDER — ONDANSETRON 2 MG/ML
INJECTION INTRAMUSCULAR; INTRAVENOUS PRN
Status: DISCONTINUED | OUTPATIENT
Start: 2020-03-24 | End: 2020-03-24

## 2020-03-24 RX ORDER — METOCLOPRAMIDE HYDROCHLORIDE 5 MG/ML
10 INJECTION INTRAMUSCULAR; INTRAVENOUS EVERY 6 HOURS PRN
Status: DISCONTINUED | OUTPATIENT
Start: 2020-03-24 | End: 2020-03-25 | Stop reason: HOSPADM

## 2020-03-24 RX ORDER — LEVOTHYROXINE SODIUM 50 UG/1
50 TABLET ORAL EVERY OTHER DAY
Status: DISCONTINUED | OUTPATIENT
Start: 2020-03-25 | End: 2020-03-25 | Stop reason: HOSPADM

## 2020-03-24 RX ORDER — NALOXONE HYDROCHLORIDE 0.4 MG/ML
.1-.4 INJECTION, SOLUTION INTRAMUSCULAR; INTRAVENOUS; SUBCUTANEOUS
Status: DISCONTINUED | OUTPATIENT
Start: 2020-03-24 | End: 2020-03-24

## 2020-03-24 RX ORDER — CALCIUM CARBONATE 500 MG/1
1000 TABLET, CHEWABLE ORAL 4 TIMES DAILY PRN
Status: DISCONTINUED | OUTPATIENT
Start: 2020-03-24 | End: 2020-03-25 | Stop reason: HOSPADM

## 2020-03-24 RX ORDER — SODIUM CHLORIDE 9 MG/ML
INJECTION, SOLUTION INTRAVENOUS CONTINUOUS
Status: DISCONTINUED | OUTPATIENT
Start: 2020-03-24 | End: 2020-03-25 | Stop reason: HOSPADM

## 2020-03-24 RX ORDER — ONDANSETRON 2 MG/ML
4 INJECTION INTRAMUSCULAR; INTRAVENOUS EVERY 30 MIN PRN
Status: DISCONTINUED | OUTPATIENT
Start: 2020-03-24 | End: 2020-03-24 | Stop reason: HOSPADM

## 2020-03-24 RX ORDER — LIDOCAINE 40 MG/G
CREAM TOPICAL
Status: DISCONTINUED | OUTPATIENT
Start: 2020-03-24 | End: 2020-03-25 | Stop reason: HOSPADM

## 2020-03-24 RX ORDER — PROCHLORPERAZINE MALEATE 10 MG
10 TABLET ORAL EVERY 6 HOURS PRN
Status: DISCONTINUED | OUTPATIENT
Start: 2020-03-24 | End: 2020-03-25 | Stop reason: HOSPADM

## 2020-03-24 RX ORDER — AMLODIPINE BESYLATE 5 MG/1
5 TABLET ORAL DAILY
Status: DISCONTINUED | OUTPATIENT
Start: 2020-03-24 | End: 2020-03-25 | Stop reason: HOSPADM

## 2020-03-24 RX ORDER — MEPERIDINE HYDROCHLORIDE 25 MG/ML
12.5 INJECTION INTRAMUSCULAR; INTRAVENOUS; SUBCUTANEOUS EVERY 5 MIN PRN
Status: DISCONTINUED | OUTPATIENT
Start: 2020-03-24 | End: 2020-03-24 | Stop reason: HOSPADM

## 2020-03-24 RX ORDER — DEXAMETHASONE SODIUM PHOSPHATE 4 MG/ML
INJECTION, SOLUTION INTRA-ARTICULAR; INTRALESIONAL; INTRAMUSCULAR; INTRAVENOUS; SOFT TISSUE PRN
Status: DISCONTINUED | OUTPATIENT
Start: 2020-03-24 | End: 2020-03-24

## 2020-03-24 RX ORDER — NEOSTIGMINE METHYLSULFATE 1 MG/ML
VIAL (ML) INJECTION PRN
Status: DISCONTINUED | OUTPATIENT
Start: 2020-03-24 | End: 2020-03-24

## 2020-03-24 RX ORDER — LISINOPRIL 2.5 MG/1
5 TABLET ORAL DAILY
Status: DISCONTINUED | OUTPATIENT
Start: 2020-03-24 | End: 2020-03-25 | Stop reason: HOSPADM

## 2020-03-24 RX ADMIN — PHENYLEPHRINE HYDROCHLORIDE 100 MCG: 10 INJECTION INTRAVENOUS at 08:19

## 2020-03-24 RX ADMIN — SODIUM CHLORIDE, POTASSIUM CHLORIDE, SODIUM LACTATE AND CALCIUM CHLORIDE: 600; 310; 30; 20 INJECTION, SOLUTION INTRAVENOUS at 07:40

## 2020-03-24 RX ADMIN — HYDROMORPHONE HYDROCHLORIDE 0.2 MG: 1 INJECTION, SOLUTION INTRAMUSCULAR; INTRAVENOUS; SUBCUTANEOUS at 19:47

## 2020-03-24 RX ADMIN — HYDROMORPHONE HYDROCHLORIDE 0.5 MG: 1 INJECTION, SOLUTION INTRAMUSCULAR; INTRAVENOUS; SUBCUTANEOUS at 08:32

## 2020-03-24 RX ADMIN — GABAPENTIN 300 MG: 300 CAPSULE ORAL at 06:00

## 2020-03-24 RX ADMIN — ACETAMINOPHEN 975 MG: 325 TABLET, FILM COATED ORAL at 06:00

## 2020-03-24 RX ADMIN — CEFAZOLIN SODIUM 2 G: 2 INJECTION, SOLUTION INTRAVENOUS at 08:00

## 2020-03-24 RX ADMIN — CEFAZOLIN 1 G: 1 INJECTION, POWDER, FOR SOLUTION INTRAMUSCULAR; INTRAVENOUS at 17:32

## 2020-03-24 RX ADMIN — PHENYLEPHRINE HYDROCHLORIDE 100 MCG: 10 INJECTION INTRAVENOUS at 09:02

## 2020-03-24 RX ADMIN — ROSUVASTATIN CALCIUM 40 MG: 20 TABLET, FILM COATED ORAL at 18:52

## 2020-03-24 RX ADMIN — PHENYLEPHRINE HYDROCHLORIDE 100 MCG: 10 INJECTION INTRAVENOUS at 09:45

## 2020-03-24 RX ADMIN — NEOSTIGMINE METHYLSULFATE 4 MG: 1 INJECTION, SOLUTION INTRAVENOUS at 10:35

## 2020-03-24 RX ADMIN — PHENYLEPHRINE HYDROCHLORIDE 100 MCG: 10 INJECTION INTRAVENOUS at 09:39

## 2020-03-24 RX ADMIN — ROCURONIUM BROMIDE 50 MG: 10 INJECTION INTRAVENOUS at 07:45

## 2020-03-24 RX ADMIN — PHENYLEPHRINE HYDROCHLORIDE 100 MCG: 10 INJECTION INTRAVENOUS at 09:21

## 2020-03-24 RX ADMIN — PROPOFOL 160 MG: 10 INJECTION, EMULSION INTRAVENOUS at 07:45

## 2020-03-24 RX ADMIN — ONDANSETRON 4 MG: 2 INJECTION INTRAMUSCULAR; INTRAVENOUS at 07:45

## 2020-03-24 RX ADMIN — Medication 1 LOZENGE: at 15:58

## 2020-03-24 RX ADMIN — DEXMEDETOMIDINE HYDROCHLORIDE 0.3 MCG/KG/HR: 100 INJECTION, SOLUTION INTRAVENOUS at 07:45

## 2020-03-24 RX ADMIN — PHENYLEPHRINE HYDROCHLORIDE 100 MCG: 10 INJECTION INTRAVENOUS at 08:04

## 2020-03-24 RX ADMIN — MIDAZOLAM 2 MG: 1 INJECTION INTRAMUSCULAR; INTRAVENOUS at 07:42

## 2020-03-24 RX ADMIN — GLYCOPYRROLATE 0.8 MG: 0.2 INJECTION, SOLUTION INTRAMUSCULAR; INTRAVENOUS at 10:35

## 2020-03-24 RX ADMIN — LIDOCAINE HYDROCHLORIDE 100 MG: 20 INJECTION, SOLUTION INFILTRATION; PERINEURAL at 07:45

## 2020-03-24 RX ADMIN — PHENYLEPHRINE HYDROCHLORIDE 100 MCG: 10 INJECTION INTRAVENOUS at 08:07

## 2020-03-24 RX ADMIN — SODIUM CHLORIDE: 9 INJECTION, SOLUTION INTRAVENOUS at 13:06

## 2020-03-24 RX ADMIN — LEVOTHYROXINE SODIUM 25 MCG: 25 TABLET ORAL at 18:52

## 2020-03-24 RX ADMIN — SODIUM CHLORIDE, POTASSIUM CHLORIDE, SODIUM LACTATE AND CALCIUM CHLORIDE: 600; 310; 30; 20 INJECTION, SOLUTION INTRAVENOUS at 10:06

## 2020-03-24 RX ADMIN — CARVEDILOL 6.25 MG: 6.25 TABLET, FILM COATED ORAL at 18:52

## 2020-03-24 RX ADMIN — ONDANSETRON 4 MG: 2 INJECTION INTRAMUSCULAR; INTRAVENOUS at 20:15

## 2020-03-24 RX ADMIN — CEFAZOLIN SODIUM 1 G: 2 INJECTION, SOLUTION INTRAVENOUS at 10:06

## 2020-03-24 RX ADMIN — FENTANYL CITRATE 25 MCG: 0.05 INJECTION, SOLUTION INTRAMUSCULAR; INTRAVENOUS at 11:10

## 2020-03-24 RX ADMIN — HYDROMORPHONE HYDROCHLORIDE 0.2 MG: 1 INJECTION, SOLUTION INTRAMUSCULAR; INTRAVENOUS; SUBCUTANEOUS at 15:33

## 2020-03-24 RX ADMIN — PHENYLEPHRINE HYDROCHLORIDE 100 MCG: 10 INJECTION INTRAVENOUS at 08:50

## 2020-03-24 RX ADMIN — LISINOPRIL 5 MG: 2.5 TABLET ORAL at 18:52

## 2020-03-24 RX ADMIN — OXYCODONE HYDROCHLORIDE 5 MG: 5 TABLET ORAL at 22:01

## 2020-03-24 RX ADMIN — PHENYLEPHRINE HYDROCHLORIDE 100 MCG: 10 INJECTION INTRAVENOUS at 08:11

## 2020-03-24 RX ADMIN — METHOCARBAMOL TABLETS 750 MG: 750 TABLET, COATED ORAL at 13:13

## 2020-03-24 RX ADMIN — METHOCARBAMOL TABLETS 750 MG: 750 TABLET, COATED ORAL at 22:01

## 2020-03-24 RX ADMIN — PHENYLEPHRINE HYDROCHLORIDE 100 MCG: 10 INJECTION INTRAVENOUS at 09:14

## 2020-03-24 RX ADMIN — METHOCARBAMOL TABLETS 750 MG: 750 TABLET, COATED ORAL at 17:31

## 2020-03-24 RX ADMIN — PHENYLEPHRINE HYDROCHLORIDE 100 MCG: 10 INJECTION INTRAVENOUS at 09:33

## 2020-03-24 RX ADMIN — FENTANYL CITRATE 100 MCG: 50 INJECTION, SOLUTION INTRAMUSCULAR; INTRAVENOUS at 07:45

## 2020-03-24 RX ADMIN — PHENYLEPHRINE HYDROCHLORIDE 100 MCG: 10 INJECTION INTRAVENOUS at 08:41

## 2020-03-24 RX ADMIN — DESVENLAFAXINE SUCCINATE 50 MG: 50 TABLET, EXTENDED RELEASE ORAL at 18:52

## 2020-03-24 RX ADMIN — PROPOFOL 50 MCG/KG/MIN: 10 INJECTION, EMULSION INTRAVENOUS at 07:45

## 2020-03-24 RX ADMIN — AMLODIPINE BESYLATE 5 MG: 5 TABLET ORAL at 18:52

## 2020-03-24 RX ADMIN — PHENYLEPHRINE HYDROCHLORIDE 100 MCG: 10 INJECTION INTRAVENOUS at 08:39

## 2020-03-24 RX ADMIN — ROCURONIUM BROMIDE 10 MG: 10 INJECTION INTRAVENOUS at 09:26

## 2020-03-24 RX ADMIN — PHENYLEPHRINE HYDROCHLORIDE 100 MCG: 10 INJECTION INTRAVENOUS at 08:15

## 2020-03-24 RX ADMIN — HYDROMORPHONE HYDROCHLORIDE 0.2 MG: 1 INJECTION, SOLUTION INTRAMUSCULAR; INTRAVENOUS; SUBCUTANEOUS at 17:32

## 2020-03-24 RX ADMIN — HYDROMORPHONE HYDROCHLORIDE 0.2 MG: 1 INJECTION, SOLUTION INTRAMUSCULAR; INTRAVENOUS; SUBCUTANEOUS at 13:14

## 2020-03-24 ASSESSMENT — MIFFLIN-ST. JEOR: SCORE: 1234.54

## 2020-03-24 NOTE — PLAN OF CARE
Pt transferred from PACU s/p C4-C7 anterior discectomy and fusion. Alert and oriented x4. VSS on 1 L O2. Neuros intact. LUE initially 4/5 strength with weak/moderate , strength improved to nearly equal with RUE after 2 hrs. Reports mild numbness in lateral VANDANA arm. Lung sounds clear. Painful swallow but tolerating clear liquids and able to swallow pill. Incision approximated, TRI. JEFFREY patent with sanguinous drainage. Due to void. L shoulder/neck pain decreased with prn dilaudid and scheduled robaxin.

## 2020-03-24 NOTE — ANESTHESIA PREPROCEDURE EVALUATION
Anesthesia Pre-Procedure Evaluation    Patient: Fallon Pizano   MRN: [id number] : 1965          Preoperative Diagnosis: Spinal stenosis in cervical region [M48.02]    Procedure(s):  Cervical 4 to Cervical 7 Anterior Cervical Discectomy and Fusion    Past Medical History:   Diagnosis Date     Acquired hypothyroidism 2017     Allergic rhinitis      CAD (coronary artery disease)     AMI, LAD stent     Cervicalgia 2019     Chest pain      Hyperlipidemia LDL goal <70 9/10/2012     Major depression     related to CAD     MI (myocardial infarction) (H) 2011     Migraines      Mild episode of recurrent major depressive disorder (H) 9/10/2012     Myalgia 2019     Palpitations      Past Surgical History:   Procedure Laterality Date     ANGIOGRAM      proximal LAD stent     C MAMMOGRAM, W/BILAT. IMPLANTS       COLONOSCOPY N/A 2017    Procedure: COLONOSCOPY;  Colonoscopy ;  Surgeon: Jaylon Botello MD;  Location: [facility]     HC ENLARGE BREAST WITH IMPLANT         Anesthesia Evaluation     .  Type: General    No history of anesthetic complications          ROS/MED HX    ENT/Pulmonary:  - neg pulmonary ROS     Neurologic:  - neg neurologic ROS     Cardiovascular: Comment: 2014 ECHO    The visual ejection fraction is estimated at >70%.  Left ventricular cavity size decreases with exercise.  Global LV systolic function augments with exercise.  Normal resting wall motion and no stress-induced wall motion abnormality.     Left Ventricle  The left ventricle is normal in size.     Mitral Valve  The mitral valve leaflets appear normal. There is no evidence of stenosis,   fluttering, or prolapse.  There is trace mitral regurgitation.  There is no mitral valve stenosis.     Tricuspid Valve  Normal tricuspid valve.  The right ventricular systolic pressure is approximated at 29 mmHg plus the   right atrial pressure.  Right ventricle systolic pressure estimate normal.  There is trace to mild  tricuspid regurgitation.  There is no tricuspid stenosis.     Aortic Valve  The aortic valve is trileaflet.  No aortic regurgitation is present.  No aortic stenosis is present.     Pulmonic Valve  The pulmonic valve is not well visualized.     Procedure  Stress Echo Complete. - neg cardiovascular ROS   (+) --CAD, --stent,. : . . . :. .      (-) congenital heart disease   METS/Exercise Tolerance:     Hematologic:  - neg hematologic  ROS       Musculoskeletal:  - neg musculoskeletal ROS       GI/Hepatic:  - neg GI/hepatic ROS       Renal/Genitourinary:  - ROS Renal section negative       Endo:     (+) thyroid problem .      Psychiatric:  - neg psychiatric ROS       Infectious Disease:  - neg infectious disease ROS       Malignancy:         Other:                          Physical Exam  Normal systems: pulmonary and dental    Airway   Mallampati: II    Dental   (+) chipped    Cardiovascular   Rhythm and rate: regular and normal      Pulmonary             Lab Results   Component Value Date    WBC 4.9 03/09/2020    HGB 15.1 03/09/2020    HCT 47.0 03/09/2020     03/09/2020    SED 10 09/12/2019     03/09/2020    POTASSIUM 4.6 03/09/2020    CHLORIDE 110 (H) 03/09/2020    CO2 25 03/09/2020    BUN 12 03/09/2020    CR 0.68 03/09/2020     (H) 03/09/2020    SHANTELL 9.3 03/09/2020    ALBUMIN 3.8 09/18/2018    PROTTOTAL 7.3 09/18/2018    ALT 30 04/09/2019    AST 20 09/18/2018    ALKPHOS 79 09/18/2018    BILITOTAL 1.3 09/18/2018    LIPASE 136 04/09/2018    TSH 2.10 10/04/2019    T4 1.27 10/27/2017       Preop Vitals  BP Readings from Last 3 Encounters:   03/24/20 128/72   03/09/20 118/80   02/24/20 101/71    Pulse Readings from Last 3 Encounters:   03/09/20 89   02/24/20 68   11/13/19 68      Resp Readings from Last 3 Encounters:   03/24/20 16   03/09/20 16   09/26/19 16    SpO2 Readings from Last 3 Encounters:   03/24/20 95%   03/09/20 96%   02/24/20 98%      Temp Readings from Last 1 Encounters:   03/24/20 36.3  C  "(97.3  F) (Oral)    Ht Readings from Last 1 Encounters:   03/24/20 1.626 m (5' 4\")      Wt Readings from Last 1 Encounters:   03/24/20 65.5 kg (144 lb 4.8 oz)    Estimated body mass index is 24.77 kg/m  as calculated from the following:    Height as of this encounter: 1.626 m (5' 4\").    Weight as of this encounter: 65.5 kg (144 lb 4.8 oz).       Anesthesia Plan      History & Physical Review  History and physical reviewed and following examination; no interval change.    ASA Status:  2 .    NPO Status:  > 8 hours    Plan for General (ETT.) with Intravenous induction. Maintenance will be Inhalation.    PONV prophylaxis:  Ondansetron (or other 5HT-3) and Dexamethasone or Solumedrol         Postoperative Care  Postoperative pain management:  Multi-modal analgesia and IV analgesics.      Consents  Anesthetic plan, risks, benefits and alternatives discussed with:  Patient..                 George Lofton MD  "

## 2020-03-24 NOTE — ANESTHESIA CARE TRANSFER NOTE
Patient: Fallon Pizano    Procedure(s):  Cervical 4 to Cervical 7 Anterior Cervical Discectomy and Fusion    Diagnosis: Spinal stenosis in cervical region [M48.02]  Diagnosis Additional Information: No value filed.    Anesthesia Type:   General     Note:  Airway :Face Mask  Patient transferred to:PACU  Handoff Report: Identifed the Patient, Identified the Reponsible Provider, Reviewed the pertinent medical history, Discussed the surgical course, Reviewed Intra-OP anesthesia mangement and issues during anesthesia, Set expectations for post-procedure period and Allowed opportunity for questions and acknowledgement of understanding      Vitals: (Last set prior to Anesthesia Care Transfer)    CRNA VITALS  3/24/2020 1010 - 3/24/2020 1046      3/24/2020             Pulse:  99    SpO2:  98 %    Resp Rate (set):  10                Electronically Signed By: NIKI Morgan CRNA  March 24, 2020  10:46 AM

## 2020-03-24 NOTE — OP NOTE
Date of surgery: 3/24/2020  Surgeon: Abdulkadir Arriaza MD  Assistant: ALICIA Clifford  Note: Kindra Henson was present for and assisted with the entire surgery, and his/her role as an assistant was crucial for aid in positioning, exposure, suctioning, retraction, and closure    Preoperative diagnosis: Cervical stenosis  Postoperative diagnosis: Cervical stenosis    Procedure:  1.  C4-5 anterior discectomy and interbody arthrodesis  2.  C5-6 anterior discectomy and interbody arthrodesis  3.  C6-7 anterior discectomy and interbody arthrodesis  4.  C4-5 insertion of intervertebral graft  5.  C5-6 insertion of intervertebral graft  6.  C6-7 insertion of intervertebral graft  7.  C4 to C7 anterior spinal instrumentation with insertion of Medtronic Zevo plate and vertebral body screws  8.  Use of intraoperative microscope and fluoroscopy    EBL: 100 mL    Indications: 54-year-old female who presented with severe, progressive neck and bilateral arm pain.  MRI demonstrated severe stenosis from C4 to C7.  She underwent extensive non-surgical intervention without improvement.  Risks, benefits, indications, and alternatives were discussed with the patient and family in detail.  All their questions were answered, and they wished to proceed with surgery.    Description of surgery: The patient was positioned supine.  Sterile prepping and draping procedures were performed.  Antibiotics were administered and timeout was performed.  A right horizontal neck incision was performed.  The monopolar was used to divide the platysma, and the Metzenbaum scissors were used to create a plane in the fascia medial to the sternocleidomastoid muscle.  Blunt dissection was used to come down upon the anterior cervical spine.  The spinal needle was used to verify the correct level.  The monopolar was used to elevate the longus coli, and the Trimline retractor was inserted.  The 15 blade was used to perform an annulotomy in the C6-7 disc  space.  A complete discectomy was performed with combination of the high-speed drill, curettes, and pituitary rongeurs.  Interbody arthrodesis was performed with a high-speed drill.  The posterior osteophytes were removed with the drill, and the posterior longitudinal ligament was removed with the Kerrison rongeurs, with decompression of the bilateral neural foramina.  An intervertebral graft was delivered in the disc space at C6-7.  Next, the 15 blade was used to perform an annulotomy in the C5-6 disc space.  A complete discectomy was performed with combination of the high-speed drill, curettes, and pituitary rongeurs.  Interbody arthrodesis was performed with a high-speed drill.  The posterior osteophytes were removed with the drill, and the posterior longitudinal ligament was removed with the Kerrison rongeurs, with decompression of the bilateral neural foramina.  An intervertebral graft was delivered in the disc space at C5-6.  The 15 blade was used to perform an annulotomy in the C4-5 disc space.  A complete discectomy was performed with combination of the high-speed drill, curettes, and pituitary rongeurs.  Interbody arthrodesis was performed with a high-speed drill.  The posterior osteophytes were removed with the drill, and the posterior longitudinal ligament was removed with the Kerrison rongeurs, with decompression of the bilateral neural foramina.  An intervertebral graft was delivered in the disc space at C4-5.  Subsequently, a Medtronic Zevo plate was positioned, and fixed into place from C4 to C7 with bilateral vertebral body screws at C4, C5, C6, and C7.  Hemostasis was achieved.  Antibiotic irrigation was performed.  The platysma and dermal layers were closed with 3-0 Vicryl sutures, and the skin was closed with a running subcuticular stitch.  There were no intraprocedural complications.

## 2020-03-24 NOTE — BRIEF OP NOTE
[facility] Brief Operative Note    Pre-operative diagnosis: Spinal stenosis in cervical region [M48.02]   Post-operative diagnosis As above   Procedure: Procedure(s):  Cervical 4 to Cervical 7 Anterior Cervical Discectomy and Fusion   Surgeon(s): Surgeon(s) and Role:     * Abdulkadir Arriaza MD - Primary   Estimated blood loss: * No values recorded between 3/24/2020  8:20 AM and 3/24/2020 10:38 AM *    Specimens: * No specimens in log *   Findings: See op note

## 2020-03-24 NOTE — ANESTHESIA POSTPROCEDURE EVALUATION
Patient: Fallon Pizano    Procedure(s):  Cervical 4 to Cervical 7 Anterior Cervical Discectomy and Fusion    Diagnosis:Spinal stenosis in cervical region [M48.02]  Diagnosis Additional Information: No value filed.    Anesthesia Type:  General    Note:  Anesthesia Post Evaluation    Patient location during evaluation: PACU  Patient participation: Able to fully participate in evaluation  Level of consciousness: awake  Pain management: adequate  Airway patency: patent  Cardiovascular status: hemodynamically stable  Respiratory status: acceptable, unassisted and room air  Hydration status: acceptable  PONV: none             Last vitals:  Vitals:    03/24/20 1115 03/24/20 1130 03/24/20 1145   BP: 110/63 105/64 112/69   Pulse: 79 76 77   Resp: 12 15 16   Temp:      SpO2: 96% 95% 96%         Electronically Signed By: George Lofton MD  March 24, 2020  11:56 AM

## 2020-03-25 VITALS
SYSTOLIC BLOOD PRESSURE: 140 MMHG | BODY MASS INDEX: 24.63 KG/M2 | HEART RATE: 78 BPM | TEMPERATURE: 98.1 F | DIASTOLIC BLOOD PRESSURE: 81 MMHG | HEIGHT: 64 IN | OXYGEN SATURATION: 91 % | RESPIRATION RATE: 16 BRPM | WEIGHT: 144.3 LBS

## 2020-03-25 LAB — GLUCOSE BLDC GLUCOMTR-MCNC: 114 MG/DL (ref 70–99)

## 2020-03-25 PROCEDURE — 25000128 H RX IP 250 OP 636: Performed by: PHYSICIAN ASSISTANT

## 2020-03-25 PROCEDURE — 25000132 ZZH RX MED GY IP 250 OP 250 PS 637: Performed by: PHYSICIAN ASSISTANT

## 2020-03-25 PROCEDURE — 25800030 ZZH RX IP 258 OP 636: Performed by: PHYSICIAN ASSISTANT

## 2020-03-25 PROCEDURE — 82962 GLUCOSE BLOOD TEST: CPT

## 2020-03-25 RX ORDER — OXYCODONE HYDROCHLORIDE 5 MG/1
5-10 TABLET ORAL EVERY 4 HOURS PRN
Qty: 40 TABLET | Refills: 0 | Status: SHIPPED | OUTPATIENT
Start: 2020-03-25 | End: 2020-05-19

## 2020-03-25 RX ORDER — METHOCARBAMOL 750 MG/1
750 TABLET, FILM COATED ORAL EVERY 6 HOURS PRN
Qty: 40 TABLET | Refills: 0 | Status: SHIPPED | OUTPATIENT
Start: 2020-03-25 | End: 2020-05-19

## 2020-03-25 RX ADMIN — OXYCODONE HYDROCHLORIDE 5 MG: 5 TABLET ORAL at 13:01

## 2020-03-25 RX ADMIN — LISINOPRIL 5 MG: 2.5 TABLET ORAL at 08:08

## 2020-03-25 RX ADMIN — AMLODIPINE BESYLATE 5 MG: 5 TABLET ORAL at 08:09

## 2020-03-25 RX ADMIN — METHOCARBAMOL TABLETS 750 MG: 750 TABLET, COATED ORAL at 08:09

## 2020-03-25 RX ADMIN — ACETAMINOPHEN 650 MG: 325 TABLET, FILM COATED ORAL at 00:01

## 2020-03-25 RX ADMIN — CEFAZOLIN 1 G: 1 INJECTION, POWDER, FOR SOLUTION INTRAMUSCULAR; INTRAVENOUS at 01:27

## 2020-03-25 RX ADMIN — LEVOTHYROXINE SODIUM 50 MCG: 50 TABLET ORAL at 08:10

## 2020-03-25 RX ADMIN — ROSUVASTATIN CALCIUM 40 MG: 20 TABLET, FILM COATED ORAL at 08:09

## 2020-03-25 RX ADMIN — CEFAZOLIN 1 G: 1 INJECTION, POWDER, FOR SOLUTION INTRAMUSCULAR; INTRAVENOUS at 10:46

## 2020-03-25 RX ADMIN — OXYCODONE HYDROCHLORIDE 10 MG: 5 TABLET ORAL at 08:13

## 2020-03-25 RX ADMIN — SODIUM CHLORIDE: 9 INJECTION, SOLUTION INTRAVENOUS at 04:25

## 2020-03-25 RX ADMIN — OXYCODONE HYDROCHLORIDE 10 MG: 5 TABLET ORAL at 04:24

## 2020-03-25 RX ADMIN — CARVEDILOL 6.25 MG: 6.25 TABLET, FILM COATED ORAL at 08:09

## 2020-03-25 RX ADMIN — METHOCARBAMOL TABLETS 750 MG: 750 TABLET, COATED ORAL at 13:00

## 2020-03-25 RX ADMIN — OXYCODONE HYDROCHLORIDE 10 MG: 5 TABLET ORAL at 01:26

## 2020-03-25 RX ADMIN — DESVENLAFAXINE SUCCINATE 50 MG: 50 TABLET, EXTENDED RELEASE ORAL at 08:09

## 2020-03-25 NOTE — PROGRESS NOTES
Pt A&O x4. CMS intact other than very slight weakness involving LUE along with left neck, shoulder and arm pain which has improved over the shift, last rated pain at a 5, taking oxycodone. No further nausea, C/o of sore throat, has only been willing to take clear liquids. VSS, on RA, Capno discontinued. Up with SBA, steady on feet. Right neck JEFFREY drain site without redness, slight swelling at stitched site, draining small amount sanguineous drainage. Scoring green on stoplight tool. Bowel sounds active, passing gas.

## 2020-03-25 NOTE — PROVIDER NOTIFICATION
MD Notification    Notified Person: MD    Notified Person Name: Jannette Collier, PA    Notification Date/Time: 3/24/20 2053    Notification Interaction: Page    Purpose of Notification: 714 LPA - FYI pt is reporting headache with emesis, headache resolves after emesis. Also pt requesting Tylenol, none ordered. Thanks.    Orders Received: Order for tylenol received.    Comments:

## 2020-03-25 NOTE — PROGRESS NOTES
"Neurosurgery Progress     Dx:     POD #1 s/p C4-7 ACDF.     Neuro stable.     TODAY'S PLAN:     Ms. Bhandari is doing well. Her pain is better controlled. Her drain output was at 40 ml overnight so we will keep it intact for now in anticipation of removing it this afternoon if first shift drainage is stable or lower. We will also discharge at that time as well. Her discharge navigator has been updated with our post-op recommendations.      In the interim, she needs to start mobilizing.      In the event that patient's symptoms worsen or change we would appreciate being contacted. We did discuss signs of a worsening problem that she should seek being evaluated.     We did review the above information with the patient whom agrees with the plan and did verbalize understanding.   ________________________________________________________________     Ms. Bhandari overall feels well and denies any significant discomfort. Tolerating regular diet without n/v. Up ambulating with assist. Voiding without difficulty.     /82 (BP Location: Right arm)   Pulse 78   Temp 98.4  F (36.9  C) (Oral)   Resp 16   Ht 1.626 m (5' 4\")   Wt 65.5 kg (144 lb 4.8 oz)   LMP 07/18/2013   SpO2 92%   Breastfeeding No   BMI 24.77 kg/m       Patient examined in room 714. She appears comfortable and in no apparent distress. She is moving all of her extremities well. Hard collar intact.   CN II-XII intact, alert and appropriate with conversation. Follows all commands.   Bilateral upper and lower extremities with appropriate strength. Deep tendon reflexes within normal limits throughout. Sensation is also intact throughout. She does have an acceptable post-operative range of motion.   Her cervical incision is absent for concerning edema, erythema or drainage.   Bandage is clean, dry and intact.   Calves soft and non-tender.     All pertinent labs and updated imaging reviewed in [facility].     Mukul Cain PA-C   Neurosurgery   [phone] " (P)     Reviewed the above information and findings with Dr. Arriaza. .

## 2020-03-25 NOTE — PLAN OF CARE
JEFFREY drain removed, had a total of 15cc bloody drainage today. Tolerating clear liquids, will advance diet as throat pain resolves.Pt discharged to home, via wheelchair with . Pt given supplies for dressing changes, along with discontinue instructions and restrictions.

## 2020-03-25 NOTE — PLAN OF CARE
POD 1 from a C4-C7 anterior discectomy and fusion with Dr. Arriaza. A&OX4. CMS intact ex slight weakness in LUE and baseline pain in L shoulder radiating down arm. Bowel sounds active, + flatus, tolerating clear liquid diet. Takes pills whole with water. VSS on R/A. Capno on. Incision approximated and TRI. JEFFREY patent with sanguineous drainage. Up with SBA. Cervical collar in place. Getting up to void frequently. C/o moderate to severe incisional and LUE pain, decreased with tylenol and oxycodone. Pt scoring green on the Aggression Stop Light Tool. Plan pending progress.

## 2020-03-25 NOTE — DISCHARGE SUMMARY
DISCHARGE SUMMARY   Patient ID:   Fallon Pizano   [id number]   54 year old   1965     Admit date: 3/24/2020     Discharge date: 03/25/2020     Admission Diagnoses: Spinal stenosis in cervical region [M48.02]     Procedure:     Cervical stenosis     Post op Diagnosis:     1.  C4-5 anterior discectomy and interbody arthrodesis  2.  C5-6 anterior discectomy and interbody arthrodesis  3.  C6-7 anterior discectomy and interbody arthrodesis  4.  C4-5 insertion of intervertebral graft  5.  C5-6 insertion of intervertebral graft  6.  C6-7 insertion of intervertebral graft  7.  C4 to C7 anterior spinal instrumentation with insertion of Medtronic Zevo plate and vertebral body screws  8.  Use of intraoperative microscope and fluoroscopy    Surgeon: Dr. Arriaza    Disposition: Home     Fallon Pizano verbalizes understanding and is in agreement with discharge.     Done while pt still in hospital bed      Review of your medicines      START taking      Dose / Directions   methocarbamol 750 MG tablet  Commonly known as:  ROBAXIN      Dose:  750 mg  Take 1 tablet (750 mg) by mouth every 6 hours as needed for muscle spasms (muscle spasm)  Quantity:  40 tablet  Refills:  0     oxyCODONE 5 MG tablet  Commonly known as:  ROXICODONE      Dose:  5-10 mg  Take 1-2 tablets (5-10 mg) by mouth every 4 hours as needed for pain (Moderate to Severe)  Quantity:  40 tablet  Refills:  0        CONTINUE these medicines which may have CHANGED, or have new prescriptions. If we are uncertain of the size of tablets/capsules you have at home, strength may be listed as something that might have changed.      Dose / Directions   aspirin 81 MG tablet  Commonly known as:  ASA  This may have changed:  These instructions start on April 7, 2020. If you are unsure what to do until then, ask your doctor or other care provider.      Dose:  81 mg  Start taking on:  April 7, 2020  Take 1 tablet (81 mg) by mouth daily  Quantity:  90  tablet  Refills:  3        CONTINUE these medicines which have NOT CHANGED      Dose / Directions   amLODIPine 5 MG tablet  Commonly known as:  NORVASC  Used for:  Other chest pain      TAKE 1 TABLET BY MOUTH EVERY DAY  Quantity:  90 tablet  Refills:  2     carvedilol 6.25 MG tablet  Commonly known as:  COREG  Used for:  Coronary artery disease involving native coronary artery of native heart without angina pectoris      TAKE 1 TABLET (6.25 MG) BY MOUTH 2 TIMES DAILY WITH MEALS  Quantity:  180 tablet  Refills:  2     desvenlafaxine 50 MG 24 hr tablet  Commonly known as:  PRISTIQ  Used for:  Mild episode of recurrent major depressive disorder (H), Cervicalgia      Dose:  50 mg  Take 1 tablet (50 mg) by mouth daily  Quantity:  90 tablet  Refills:  1     fluticasone 50 MCG/ACT nasal spray  Commonly known as:  FLONASE  Used for:  Acute non-recurrent maxillary sinusitis      INHALE 1-2 SPRAYS INTO BOTH NOSTRILS DAILY  Quantity:  16 mL  Refills:  1     * levothyroxine 25 MCG tablet  Commonly known as:  SYNTHROID/LEVOTHROID  Used for:  Acquired hypothyroidism      TAKE 1 TABLET BY MOUTH EVERY OTHER DAY ALTERNATING WITH 50 MCG TABLETS  Quantity:  45 tablet  Refills:  3     * levothyroxine 50 MCG tablet  Commonly known as:  SYNTHROID/LEVOTHROID  Used for:  Acquired hypothyroidism      TAKE 1 TABLET BY MOUTH EVERY other day alternating with 25 mcg  Quantity:  45 tablet  Refills:  2     lisinopril 5 MG tablet  Commonly known as:  ZESTRIL  Used for:  Coronary artery disease involving native coronary artery of native heart without angina pectoris      TAKE 1 TABLET BY MOUTH EVERY DAY  Quantity:  90 tablet  Refills:  1     nitroGLYcerin 0.4 MG sublingual tablet  Commonly known as:  NITROSTAT  Used for:  Coronary artery disease involving native coronary artery of native heart without angina pectoris      PLACE 1 TABLET UNDER THE TONGUE EVERY 5 MINUTES AS NEEDED FOR CHEST PAIN  Quantity:  25 tablet  Refills:  0     rosuvastatin 40  MG tablet  Commonly known as:  CRESTOR  Used for:  Coronary artery disease involving native coronary artery of native heart without angina pectoris, Hyperlipidemia LDL goal <70      TAKE 1 TABLET BY MOUTH EVERY DAY  Quantity:  30 tablet  Refills:  5     zolpidem 5 MG tablet  Commonly known as:  Ambien  Used for:  Insomnia, unspecified type      Dose:  5 mg  Take 1 tablet (5 mg) by mouth nightly as needed for sleep  Quantity:  30 tablet  Refills:  1         * This list has 2 medication(s) that are the same as other medications prescribed for you. Read the directions carefully, and ask your doctor or other care provider to review them with you.            STOP taking    BENADRYL ALLERGY PO        IBUPROFEN PO              Where to get your medicines      These medications were sent to [facility] [place], [place] - [address] [facility]  [address] [facility] [address]    Phone:  [phone]     aspirin 81 MG tablet     Some of these will need a paper prescription and others can be bought over the counter. Ask your nurse if you have questions.    Bring a paper prescription for each of these medications    methocarbamol 750 MG tablet    oxyCODONE 5 MG tablet          Discharge Procedure Orders   No lifting    Order Comments: No lifting over 10 lbs and no strenuous physical activity for 6 weeks     Dressing   Order Comments: Keep your incision clean and dry at all times.  OK to remove dressing on postop day 2.  OK to shower on postop day 3 and allow water to run over incision, pat dry after shower.  No bathing swimming or submerging in water.  Call immediately or come to ED if any drainage occurs, or you develop new pain, redness, or swelling.     Ice to affected area   Order Comments: Ice to operative site PRN     Discharge Instructions   Order Comments: Please follow up at [facility] in 6 weeks with cervical xray prior.  Please call the clinic at [phone] to  schedule your appointment.     Discharge Instructions   Order Comments: Discharge instructions:  No lifting of more than 10 pounds, no bending, no twisting until follow up visit.  Wear cervical collar.     Ok to shampoo hair, shower or bathe, but, do not scrub or submerge incision under water until evaluated post-operatively in clinic.    Ok to walk as tolerated, avoid bed rest and prolonged sitting.    No contact sports until after follow up visit  No high impact activities such as; running/jogging, snowmobile or 4 collado riding or any other recreational vehicles.    Do not take NSAIDS (ibuprofen, advil, aleve, naproxen, etc) for pain control.    Do NOT drive while taking narcotic pain medication.    Call the [facility] at [phone] for increasing redness, swelling or pus draining from the incision, increased pain or any other questions and concerns.     Reason for your hospital stay   Order Comments: C4-7 ACDF     Follow-up and recommended labs and tests    Order Comments: Your post-operative follow up appointments have been made for two weeks with a nurse for a well being and wound check visit as well as in six weeks with a provider.     Activity   Order Comments: Please limit your lifting to no more that ten pounds and avoid excessive bending, twisting, and turning the cervical spine. You should also avoid excessive jostling and jarring activities.     Order Specific Question Answer Comments   Is discharge order? Yes      Wound care and dressings   Order Comments: Instructions to care for your wound at home: ice to area for comfort, keep wound clean and dry and may get incision wet in shower but do not soak or scrub.     Diet   Order Comments: Follow this diet upon discharge: Advance to a regular diet as tolerated.     Order Specific Question Answer Comments   Is discharge order? Yes           Respectfully,   ROMI De La Paz, FELICIA

## 2020-03-25 NOTE — PLAN OF CARE
C4-C7 anterior discectomy and fusion. Alert and oriented x4. VSS on 1 L O2. Neuros intact. Moderate strength, slightly less in the R. Reports mild discomfort in lateral VANDANA arm. Lung sounds clear. Painful swallow but tolerating clear liquids and able to swallow pill. Incision approximated, TRI. JEFFREY patent with sanguinous drainage. PVRs, up to commode but still not completely emptying bladder. L shoulder/neck pain decreased with prn dilaudid and scheduled robaxin. Plan pending.

## 2020-03-31 ENCOUNTER — MYC MEDICAL ADVICE (OUTPATIENT)
Dept: NEUROSURGERY | Facility: CLINIC | Age: 55
End: 2020-03-31

## 2020-03-31 ENCOUNTER — MYC REFILL (OUTPATIENT)
Dept: INTERNAL MEDICINE | Facility: CLINIC | Age: 55
End: 2020-03-31

## 2020-03-31 DIAGNOSIS — G47.00 INSOMNIA, UNSPECIFIED TYPE: ICD-10-CM

## 2020-03-31 DIAGNOSIS — Z98.1 S/P CERVICAL SPINAL FUSION: Primary | ICD-10-CM

## 2020-03-31 RX ORDER — ZOLPIDEM TARTRATE 5 MG/1
5 TABLET ORAL
Qty: 30 TABLET | Refills: 1 | Status: SHIPPED | OUTPATIENT
Start: 2020-03-31 | End: 2020-05-28

## 2020-03-31 NOTE — TELEPHONE ENCOUNTER
"Per Dr Arriaza:\" Let's get an XR and have her come in to clinic on Thursday to be evaluated.\"     Spoke to patient. Reviewed above plan from Dr Arriaza. Patient verbalized understanding and very appreciative. Order for cervical XR placed. Will route to our scheduling team to schedule XR. Writer scheduled her see Dr Arriaza Thurs 4/2/20 in [place].         "

## 2020-03-31 NOTE — TELEPHONE ENCOUNTER
Per Humaira Freitas PA-C , can we touch base with her and see what she means by cannot move her arm?   Also - she should be wearing collar whenever she is up out of bed moving around.     Spoke to patient. She is s/p C4-7 ACDF with Dr Arriaza on 3/24/20. Her right arm feels like its in a full muscle spasm from shoulder through the whole arm . When patient wakes up her arm is like this, hurts a lot, dull pain , trying to move and massage with her other arm like trying to work out a muscle spasms. She states this goes on for hours. Uses left arm to  her right arm. States it feels heavy and dead weight. No tingling or numbness, muscle spasm with pain and weakness. Confirmed patient is wearing collar when up and out of bed. She is taking 1 tab of oxycodone for pain. Advised ok to take 2 tabs q 4 hrs as needed. She verbalized understanding. Will send a message out to Dr Arriaza for review.

## 2020-03-31 NOTE — TELEPHONE ENCOUNTER
Requested Prescriptions   Pending Prescriptions Disp Refills     zolpidem (AMBIEN) 5 MG tablet  Last Written Prescription Date:  7/13/17  Last Fill Quantity: 30,  # refills: 1   Last office visit: 3/9/2020 with prescribing provider:  Dr. Carter   Future Office Visit:   Next 5 appointments (look out 90 days)    May 11, 2020 10:00 AM CDT  Return Visit with Kindra Henson NP  [facility] ([facility]) [address] [place] [id number] [phone]   May 19, 2020  9:15 AM CDT  Return Visit with Babatunde Hernandez MD  [facility] ([facility]) [address] [address] [id number] [phone]   Jun 29, 2020 10:00 AM CDT  Return Visit with Humaira Freitas PA-C  [facility] ([facility]) [address] [place] [id number] [phone]         30 tablet 1     Sig: Take 1 tablet (5 mg) by mouth nightly as needed for sleep       There is no refill protocol information for this order      Routing refill request to provider for review/approval because:  Drug not on the [facility] refill protocol   A break in medication

## 2020-04-01 NOTE — TELEPHONE ENCOUNTER
Attempted to contact patient to make sure that xray is scheduled for Thursday 4/2. Line busy. Will attempt to contact pt later today.

## 2020-04-02 ENCOUNTER — OFFICE VISIT (OUTPATIENT)
Dept: NEUROSURGERY | Facility: CLINIC | Age: 55
End: 2020-04-02
Attending: NEUROLOGICAL SURGERY
Payer: COMMERCIAL

## 2020-04-02 ENCOUNTER — ANCILLARY PROCEDURE (OUTPATIENT)
Dept: GENERAL RADIOLOGY | Facility: CLINIC | Age: 55
End: 2020-04-02
Attending: NEUROLOGICAL SURGERY
Payer: COMMERCIAL

## 2020-04-02 VITALS — TEMPERATURE: 98 F

## 2020-04-02 DIAGNOSIS — Z98.1 S/P CERVICAL SPINAL FUSION: Primary | ICD-10-CM

## 2020-04-02 DIAGNOSIS — Z98.1 S/P CERVICAL SPINAL FUSION: ICD-10-CM

## 2020-04-02 PROCEDURE — G0463 HOSPITAL OUTPT CLINIC VISIT: HCPCS

## 2020-04-02 PROCEDURE — 99024 POSTOP FOLLOW-UP VISIT: CPT | Performed by: NEUROLOGICAL SURGERY

## 2020-04-02 PROCEDURE — 72040 X-RAY EXAM NECK SPINE 2-3 VW: CPT

## 2020-04-02 ASSESSMENT — PAIN SCALES - GENERAL: PAINLEVEL: EXTREME PAIN (8)

## 2020-04-02 NOTE — PROGRESS NOTES
10 days post-op.  Has noted pain in the right shoulder/upper arm, some pain limitation in right shoulder abduction, but grossly full strength on exam.  XRs look good and incision healing well.       Past Medical History:   Diagnosis Date     Acquired hypothyroidism 2017     Allergic rhinitis      CAD (coronary artery disease)     AMI, LAD stent     Cervicalgia 2019     Chest pain      Hyperlipidemia LDL goal <70 9/10/2012     Major depression     related to CAD     MI (myocardial infarction) (H) 2011     Migraines      Mild episode of recurrent major depressive disorder (H) 9/10/2012     Myalgia 2019     Palpitations      Past Surgical History:   Procedure Laterality Date     ANGIOGRAM      proximal LAD stent     C MAMMOGRAM, W/BILAT. IMPLANTS       COLONOSCOPY N/A 2017    Procedure: COLONOSCOPY;  Colonoscopy ;  Surgeon: Jaylon Botello MD;  Location: [facility]     DISCECTOMY, FUSION CERVICAL ANTERIOR THREE+ LEVELS, COMBINED N/A 3/24/2020    Procedure: Cervical 4 to Cervical 7 Anterior Cervical Discectomy and Fusion;  Surgeon: Abdulkadir Arriaza MD;  Location: [facility] OR     HC ENLARGE BREAST WITH IMPLANT       Social History     Socioeconomic History     Marital status:      Spouse name: Not on file     Number of children: 1     Years of education: Not on file     Highest education level: Not on file   Occupational History     Employer: NONE    Social Needs     Financial resource strain: Not on file     Food insecurity     Worry: Not on file     Inability: Not on file     Transportation needs     Medical: Not on file     Non-medical: Not on file   Tobacco Use     Smoking status: Former Smoker     Types: Cigarettes     Last attempt to quit: 3/10/2020     Years since quittin.0     Smokeless tobacco: Never Used     Tobacco comment: smokes occ   Substance and Sexual Activity     Alcohol use: Yes     Alcohol/week: 0.0 standard drinks     Comment: Casual     Drug use: No     Sexual  activity: Yes     Partners: Male   Lifestyle     Physical activity     Days per week: Not on file     Minutes per session: Not on file     Stress: Not on file   Relationships     Social connections     Talks on phone: Not on file     Gets together: Not on file     Attends Restoration service: Not on file     Active member of club or organization: Not on file     Attends meetings of clubs or organizations: Not on file     Relationship status: Not on file     Intimate partner violence     Fear of current or ex partner: Not on file     Emotionally abused: Not on file     Physically abused: Not on file     Forced sexual activity: Not on file   Other Topics Concern     Parent/sibling w/ CABG, MI or angioplasty before 65F 55M? Not Asked      Service Not Asked     Blood Transfusions Not Asked     Caffeine Concern Yes     Comment: 3+ daily     Occupational Exposure Not Asked     Hobby Hazards Not Asked     Sleep Concern No     Stress Concern No     Weight Concern Not Asked     Special Diet No     Comment: watches fats and sodium     Back Care Not Asked     Exercise No     Bike Helmet Not Asked     Seat Belt Not Asked     Self-Exams Not Asked   Social History Narrative     Not on file     Family History   Problem Relation Age of Onset     Diabetes Mother      C.A.D. Mother      Heart Disease Mother         valve disease, pacer     Connective Tissue Disorder Mother         autoimmune disease     Gastrointestinal Disease Mother         cirrhosis, non alcoholic     C.A.D. Father 55        ROS: 10 point ROS neg other than the symptoms noted above in the HPI.    Physical Exam  Temp 98  F (36.7  C)   LMP 07/18/2013   HEENT:  Normocephalic, atraumatic.  PERRLA.  EOM s intact.  Visual fields full to gross exam  Neck:  Supple, non-tender, without lymphadenopathy.  Heart:  No peripheral edema  Lungs:  No SOB  Abdomen:  Non-distended.   Skin:  Warm and dry.  Extremities:  No edema, cyanosis or clubbing.  Psychiatric:  No  apparent distress  Musculoskeletal:  Normal bulk and tone    NEUROLOGICAL EXAMINATION:     Mental status:  Alert and Oriented x 3, speech is fluent.  Cranial nerves:  II-XII intact.   Motor:    Shoulder Abduction:  Right:  5/5   Left:  5/5  Biceps:                      Right:  5/5   Left:  5/5  Triceps:                     Right:  5/5   Left:  5/5  Wrist Extensors:       Right:  5/5   Left:  5/5  Wrist Flexors:           Right:  5/5   Left:  5/5  interosseus :            Right:  5/5   Left:  5/5  Hip Flexor:                Right: 5/5  Left:  5/5  Quadriceps:             Right:  5/5  Left:  5/5  Hamstrings:             Right:  5/5  Left:  5/5  Gastroc Soleus:        Right:  5/5  Left:  5/5  Tib/Ant:                      Right:  5/5  Left:  5/5  EHL:                     Right:  5/5  Left:  5/5  Mild pain limitation in right shoulder abduction  Sensation:  Intact  Reflexes:  Negative Babinski.  Negative Clonus.  Negative Streeter's.  Coordination:  Smooth finger to nose testing.   Negative pronator drift.  Smooth tandem walking.  Incision well healed    A/P:  Plan for continued routine post-op care  Discussed that shoulder pain may be related to positioning in surgery  Will re-assess at next follow up, might try PT if persistent symptoms at that time

## 2020-04-02 NOTE — LETTER
4/2/2020         RE: Fallon Pizano  [address]  [facility] [address]        Dear Colleague,    Thank you for referring your patient, Fallon Pizano, to the [facility] NEUROSURGERY CLINIC. Please see a copy of my visit note below.    10 days post-op.  Has noted pain in the right shoulder/upper arm, some pain limitation in right shoulder abduction, but grossly full strength on exam.  XRs look good and incision healing well.       Past Medical History:   Diagnosis Date     Acquired hypothyroidism 4/7/2017     Allergic rhinitis      CAD (coronary artery disease) 4/11    AMI, LAD stent     Cervicalgia 9/12/2019     Chest pain      Hyperlipidemia LDL goal <70 9/10/2012     Major depression     related to CAD     MI (myocardial infarction) (H) 04/2011     Migraines      Mild episode of recurrent major depressive disorder (H) 9/10/2012     Myalgia 9/12/2019     Palpitations      Past Surgical History:   Procedure Laterality Date     ANGIOGRAM  4/11    proximal LAD stent     C MAMMOGRAM, W/BILAT. IMPLANTS       COLONOSCOPY N/A 11/22/2017    Procedure: COLONOSCOPY;  Colonoscopy ;  Surgeon: Jaylon Botello MD;  Location: [facility]     DISCECTOMY, FUSION CERVICAL ANTERIOR THREE+ LEVELS, COMBINED N/A 3/24/2020    Procedure: Cervical 4 to Cervical 7 Anterior Cervical Discectomy and Fusion;  Surgeon: Abdulkadir Arriaza MD;  Location: [facility] OR     HC ENLARGE BREAST WITH IMPLANT       Social History     Socioeconomic History     Marital status:      Spouse name: Not on file     Number of children: 1     Years of education: Not on file     Highest education level: Not on file   Occupational History     Employer: NONE    Social Needs     Financial resource strain: Not on file     Food insecurity     Worry: Not on file     Inability: Not on file     Transportation needs     Medical: Not on file     Non-medical: Not on file   Tobacco Use     Smoking status: Former Smoker     Types:  Cigarettes     Last attempt to quit: 3/10/2020     Years since quittin.0     Smokeless tobacco: Never Used     Tobacco comment: smokes occ   Substance and Sexual Activity     Alcohol use: Yes     Alcohol/week: 0.0 standard drinks     Comment: Casual     Drug use: No     Sexual activity: Yes     Partners: Male   Lifestyle     Physical activity     Days per week: Not on file     Minutes per session: Not on file     Stress: Not on file   Relationships     Social connections     Talks on phone: Not on file     Gets together: Not on file     Attends Mu-ism service: Not on file     Active member of club or organization: Not on file     Attends meetings of clubs or organizations: Not on file     Relationship status: Not on file     Intimate partner violence     Fear of current or ex partner: Not on file     Emotionally abused: Not on file     Physically abused: Not on file     Forced sexual activity: Not on file   Other Topics Concern     Parent/sibling w/ CABG, MI or angioplasty before 65F 55M? Not Asked      Service Not Asked     Blood Transfusions Not Asked     Caffeine Concern Yes     Comment: 3+ daily     Occupational Exposure Not Asked     Hobby Hazards Not Asked     Sleep Concern No     Stress Concern No     Weight Concern Not Asked     Special Diet No     Comment: watches fats and sodium     Back Care Not Asked     Exercise No     Bike Helmet Not Asked     Seat Belt Not Asked     Self-Exams Not Asked   Social History Narrative     Not on file     Family History   Problem Relation Age of Onset     Diabetes Mother      C.A.D. Mother      Heart Disease Mother         valve disease, pacer     Connective Tissue Disorder Mother         autoimmune disease     Gastrointestinal Disease Mother         cirrhosis, non alcoholic     C.A.D. Father 55        ROS: 10 point ROS neg other than the symptoms noted above in the HPI.    Physical Exam  Temp 98  F (36.7  C)   LMP 2013   HEENT:  Normocephalic,  atraumatic.  PERRLA.  EOM s intact.  Visual fields full to gross exam  Neck:  Supple, non-tender, without lymphadenopathy.  Heart:  No peripheral edema  Lungs:  No SOB  Abdomen:  Non-distended.   Skin:  Warm and dry.  Extremities:  No edema, cyanosis or clubbing.  Psychiatric:  No apparent distress  Musculoskeletal:  Normal bulk and tone    NEUROLOGICAL EXAMINATION:     Mental status:  Alert and Oriented x 3, speech is fluent.  Cranial nerves:  II-XII intact.   Motor:    Shoulder Abduction:  Right:  5/5   Left:  5/5  Biceps:                      Right:  5/5   Left:  5/5  Triceps:                     Right:  5/5   Left:  5/5  Wrist Extensors:       Right:  5/5   Left:  5/5  Wrist Flexors:           Right:  5/5   Left:  5/5  interosseus :            Right:  5/5   Left:  5/5  Hip Flexor:                Right: 5/5  Left:  5/5  Quadriceps:             Right:  5/5  Left:  5/5  Hamstrings:             Right:  5/5  Left:  5/5  Gastroc Soleus:        Right:  5/5  Left:  5/5  Tib/Ant:                      Right:  5/5  Left:  5/5  EHL:                     Right:  5/5  Left:  5/5  Mild pain limitation in right shoulder abduction  Sensation:  Intact  Reflexes:  Negative Babinski.  Negative Clonus.  Negative Streeter's.  Coordination:  Smooth finger to nose testing.   Negative pronator drift.  Smooth tandem walking.  Incision well healed    A/P:  Plan for continued routine post-op care  Discussed that shoulder pain may be related to positioning in surgery  Will re-assess at next follow up, might try PT if persistent symptoms at that time      Again, thank you for allowing me to participate in the care of your patient.        Sincerely,        Abdulkadir Arriaza MD

## 2020-04-21 ENCOUNTER — TELEPHONE (OUTPATIENT)
Dept: NEUROSURGERY | Facility: OTHER | Age: 55
End: 2020-04-21

## 2020-04-21 NOTE — TELEPHONE ENCOUNTER
Patient is calling regarding getting a letter wrote regarding her surgery and how she was out of work. She would like a call back to discuss letter specifics. Please call the patient back [phone]

## 2020-04-21 NOTE — TELEPHONE ENCOUNTER
Patient is s/p Cervical 4 to Cervical 7 Anterior Cervical Discectomy and Fusion on 03/24/2020 by Dr. Arriaza.    Patient called stating she has some unemployment paperwork to be filled out by our team. She reports she quit her job 2 weeks prior to surgery. Nursing advised she send the paperwork to be reviewed and see if it is something we fill out.

## 2020-04-23 NOTE — TELEPHONE ENCOUNTER
Filled out medical statement for unemployment insurance.    Date: 4/23/20  Fax: [phone]    Original to medical records and copy in bin.

## 2020-04-24 DIAGNOSIS — I25.10 CORONARY ARTERY DISEASE INVOLVING NATIVE CORONARY ARTERY OF NATIVE HEART WITHOUT ANGINA PECTORIS: ICD-10-CM

## 2020-04-24 DIAGNOSIS — F33.0 MILD EPISODE OF RECURRENT MAJOR DEPRESSIVE DISORDER (H): ICD-10-CM

## 2020-04-24 DIAGNOSIS — M54.2 CERVICALGIA: ICD-10-CM

## 2020-04-24 RX ORDER — DESVENLAFAXINE 50 MG/1
TABLET, FILM COATED, EXTENDED RELEASE ORAL
Qty: 90 TABLET | Refills: 1 | Status: SHIPPED | OUTPATIENT
Start: 2020-04-24 | End: 2020-10-21

## 2020-04-24 RX ORDER — CARVEDILOL 6.25 MG/1
TABLET ORAL
Qty: 180 TABLET | Refills: 1 | Status: SHIPPED | OUTPATIENT
Start: 2020-04-24 | End: 2020-10-21

## 2020-04-24 RX ORDER — LISINOPRIL 5 MG/1
TABLET ORAL
Qty: 90 TABLET | Refills: 1 | Status: SHIPPED | OUTPATIENT
Start: 2020-04-24 | End: 2020-10-21

## 2020-05-04 ENCOUNTER — OFFICE VISIT (OUTPATIENT)
Dept: NEUROSURGERY | Facility: CLINIC | Age: 55
End: 2020-05-04
Attending: NEUROLOGICAL SURGERY
Payer: COMMERCIAL

## 2020-05-04 DIAGNOSIS — Z98.1 S/P CERVICAL SPINAL FUSION: Primary | ICD-10-CM

## 2020-05-04 PROCEDURE — 99024 POSTOP FOLLOW-UP VISIT: CPT | Performed by: NURSE PRACTITIONER

## 2020-05-04 PROCEDURE — G0463 HOSPITAL OUTPT CLINIC VISIT: HCPCS

## 2020-05-04 ASSESSMENT — PAIN SCALES - GENERAL: PAINLEVEL: SEVERE PAIN (6)

## 2020-05-04 NOTE — NURSING NOTE
"Fallon Pizano is a 54 year old female who presents for:  Chief Complaint   Patient presents with     Surgical Followup        Initial Vitals:  LMP 07/18/2013  Estimated body mass index is 24.77 kg/m  as calculated from the following:    Height as of 3/24/20: 5' 4\" (1.626 m).    Weight as of 3/24/20: 144 lb 4.8 oz (65.5 kg).. There is no height or weight on file to calculate BSA. BP completed using cuff size: NA (Not Taken)  Severe Pain (6)    Nursing Comments:     Pema Lay RN    "

## 2020-05-04 NOTE — LETTER
5/4/2020         RE: Fallon Pizano  [address]  [facility] [address]        Dear Colleague,    Thank you for referring your patient, Fallon Pizano, to the [facility] NEUROSURGERY CLINIC. Please see a copy of my visit note below.    [facility] Neurosurgery  Neurosurgery Followup:    HPI: 6 weeks s/p C4-7 ACDF. Notes improvement in right shoulder/upper arm pain and improvement in strength. Denies radicular pain, paresthesias, and overt weakness.     Medical, surgical, family, and social history unchanged since prior exam.    Exam:  Constitutional:  Alert, well nourished, NAD.  HEENT: Normocephalic, atraumatic.   Pulm:  Without shortness of breath   CV:  No pitting edema of BLE.     Vital Signs:  LMP 07/18/2013     Neurological:  Awake  Alert  Oriented x 3  Motor exam:     Shoulder Abduction:  Right:  5/5    Left:  5/5  Biceps:                      Right:  5/5    Left:  5/5  Triceps:                     Right:  5/5    Left:  5/5  Wrist Extensors:       Right:  5/5    Left:  5/5  Wrist Flexors:           Right:  5/5    Left:  5/5  Intrinsics:                  Right:  5/5    Left:  5/5     Able to spontaneously move U/E bilaterally  Sensation intact throughout all U/E dermatomes    Incisions:  Healing nicely.    Imaging:  None    A/P: 6 weeks s/p C4-7 ACDF    Patient Instructions   - May increase lifting restriction to 20 pounds.    - Physical therapy was ordered. They will contact you to schedule.    - Follow up in 6 weeks with xray prior     - Call the clinic at [phone] for increased pain or any other questions and concerns.    Kindra Henson CNP  [facility] Neurosurgery  [address] [place] [address]  [address] [place] [id number]  Tel [phone]  Fax [phone]      Again, thank you for allowing me to participate in the care of your patient.        Sincerely,        Kindra Henson, CHANDU

## 2020-05-04 NOTE — PATIENT INSTRUCTIONS
- May increase lifting restriction to 20 pounds.    - Physical therapy was ordered. They will contact you to schedule.    - Follow up in 6 weeks with xray prior     - Call the clinic at [phone] for increased pain or any other questions and concerns.

## 2020-05-04 NOTE — PROGRESS NOTES
[facility] Neurosurgery  Neurosurgery Followup:    HPI: 6 weeks s/p C4-7 ACDF. Notes improvement in right shoulder/upper arm pain and improvement in strength. Denies radicular pain, paresthesias, and overt weakness.     Medical, surgical, family, and social history unchanged since prior exam.    Exam:  Constitutional:  Alert, well nourished, NAD.  HEENT: Normocephalic, atraumatic.   Pulm:  Without shortness of breath   CV:  No pitting edema of BLE.     Vital Signs:  LMP 07/18/2013     Neurological:  Awake  Alert  Oriented x 3  Motor exam:     Shoulder Abduction:  Right:  5/5    Left:  5/5  Biceps:                      Right:  5/5    Left:  5/5  Triceps:                     Right:  5/5    Left:  5/5  Wrist Extensors:       Right:  5/5    Left:  5/5  Wrist Flexors:           Right:  5/5    Left:  5/5  Intrinsics:                  Right:  5/5    Left:  5/5     Able to spontaneously move U/E bilaterally  Sensation intact throughout all U/E dermatomes    Incisions:  Healing nicely.    Imaging:  None    A/P: 6 weeks s/p C4-7 ACDF    Patient Instructions   - May increase lifting restriction to 20 pounds.    - Physical therapy was ordered. They will contact you to schedule.    - Follow up in 6 weeks with xray prior     - Call the clinic at [phone] for increased pain or any other questions and concerns.    Kindra Henson CNP  [facility] Neurosurgery  [address] [place] [id number]  Tel [phone]  Fax [phone]

## 2020-05-18 ENCOUNTER — VIRTUAL VISIT (OUTPATIENT)
Dept: PHYSICAL THERAPY | Facility: CLINIC | Age: 55
End: 2020-05-18
Attending: NURSE PRACTITIONER
Payer: COMMERCIAL

## 2020-05-18 DIAGNOSIS — Z47.89 AFTERCARE FOLLOWING SURGERY OF THE MUSCULOSKELETAL SYSTEM: ICD-10-CM

## 2020-05-18 DIAGNOSIS — M43.22 CERVICAL VERTEBRAL FUSION: ICD-10-CM

## 2020-05-18 DIAGNOSIS — Z98.1 S/P CERVICAL SPINAL FUSION: ICD-10-CM

## 2020-05-18 PROCEDURE — 97110 THERAPEUTIC EXERCISES: CPT | Mod: 95 | Performed by: PHYSICAL THERAPIST

## 2020-05-18 PROCEDURE — 97161 PT EVAL LOW COMPLEX 20 MIN: CPT | Mod: 95 | Performed by: PHYSICAL THERAPIST

## 2020-05-18 NOTE — PROGRESS NOTES
"Physical Therapy Virtual Initial Visit      The patient has been notified of following:     \"This virtual visit will be conducted between you and your provider. We have found that certain health care needs can be provided without the need for physical presence.  This service lets us provide the care you need with a virtual visit.\"    Due to external, as well as internal [facility] management of the COVID-19 Virus, Fallon Pizano was not seen in our clinic.  As a substitution, we implemented a virtual visit to manage this patient's condition utilizing the User[employer] virtual visit platform via the patient s existing code.  The provider, Rubia Mike, reviewed the patient's chart, PTRx prescription, and spoke with the patient to determine the following telemedicine visit is appropriate and effective for the patient's care.    The following type of visit was completed:   Video Visit:  The UserEvents[employer] platform uses a synchronous HIPAA compliant video stream for this patient encounter.         Subjective:  The history is provided by the patient. No  was used.   Therapist Generated HPI Evaluation  Problem details: Patient had problem with cervical and right UE for 2 years, no injury, that was not responding to conservative treatment.  On 3-24-20 she underwent C4-C7 discectomy and fusion.  Prior to surgery she was having right>left shoulder pain/\"spasming\", pain right UE to the hand >left to the hand and right arm weakness.  At this time pain is constant right upper trapezius, right shoulder and upper arm to the forearm and right 3rd/4th digits.  Pain ranges 4-6/10, describes as \"dull\" and she continues to have feeling of \"weakess\" right UE.  She no longer has left-sided symptoms.  She has not used her brace for about a week.  She has a 20# lifting restriction.  She has not been dong any exercises, not using ice.  Symptoms increase with reaching overhead to the front, side, unable to reach the " back for her bra hook, not losing sleep if takes medication, sitting>couple hours, turning the head and she is unable to use right arm normally on the steering wheel.  Symptoms decrease with medication, with Biofreeze. .                     Pain is the same all the time.  Progression since onset: right UE not improving, cervical and left UE improved.         Barriers include:  None as reported by patient (needs help by  to make the bed).    Patient Health History           General health as reported by patient is good.  Pertinent medical history includes: heart problems.   Red flags:  None as reported by patient.   Other medical allergies details: cipro medications.   Surgeries include:  Orthopedic surgery and other. Other surgery history details: cervical fusion.    Current medications:  Sleep medication and cardiac medication (OTC ibuprofen).    Current occupation is Admistrative/office - FT  - has not worked since before surgery.   Primary job tasks include:  Computer work.                              Objective:    Standing Alignment:    Cervical/Thoracic:  Forward head, cervical lordosis decreased and thoracic kyphosis increased (healing right anterior cervical incision)  Shoulder/UE:  Rounded shoulders                                Cervical/Thoracic Evaluation    AROM:  AROM Cervical:    Flexion:          25%  Extension:       10%  Rotation:         Left: 50%     Right: 33%  Side Bend:      Left:     Right:       Headaches: none (was prior to surgery)                                                          Right shoulder AROM flexion  and abduction limited to 80 degrees with pain with both, ext/IR to L1 with pain.     Left shoulder AROM WNL flexion, abduction and ext/IR to T9    Pretest symptoms sitting:  Pain right shoulder to the forearm and right 3rd/4th digit  Correction of sitting posture with lumbar roll:  No effect  Seated cervical retraction with gentle patient overpressure:  Slight increase  right upper arm pain, worse, no effect shoulder AROM  Seated cervical retraction (no overpressure):  No effect sxs or AROM shoulder or cervical      PTRx Content from today's visit:  Exercise Name: Cervical Retraction - Reps: 10 - do not add hands to chin, do actively instead - Sessions: 3-4, Notes: **STOP if right arm symptoms are worsening overall  2) Walking -  5-15 minutes (outside vs treadmill preferred) 1-2x/day - do not increase your pain  Exercise Name: Posture Correction with Lumbar Roll  Exercise Name: Icing, Notes: 10-12 minutes back of the neck as needed for pain  Exercise Name: Virtual Visit Tips for Patients    Assessment/Plan:     Patient is a 54 year old female with cervical complaints.    Patient has the following significant findings with corresponding treatment plan.                Diagnosis 1:  S/p C4-C7 fusion    Pain -  hot/cold therapy, self management, education and home program  Decreased ROM/flexibility - therapeutic exercise and home program  Decreased strength - therapeutic exercise, therapeutic activities and home program  Inflammation - cold therapy and self management/home program  Decreased function - therapeutic activities and home program  Impaired posture - neuro re-education    Cumulative Therapy Evaluation is: Low complexity.    Previous and current functional limitations:  (See Goal Flow Sheet for this information)    Short term and Long term goals: (See Goal Flow Sheet for this information)     Communication ability:  Patient appears to be able to clearly communicate and understand verbal and written communication and follow directions correctly.  Treatment Explanation - The following has been discussed with the patient:   RX ordered/plan of care  Anticipated outcomes  Possible risks and side effects  This patient would benefit from PT intervention to resume normal activities.   Rehab potential is good.    Frequency:  1 X week, once daily  Duration:  for 6 weeks  Discharge  Plan:  Achieve all LTG.  Independent in home treatment program.  Reach maximal therapeutic benefit.    Please refer to the daily flowsheet for treatment today, total treatment time and time spent performing 1:1 timed codes.       Virtual visit contact time    Time of service began: 1:20 PM  Time of service ended: 2:06 PM  Total Time for set up, visit, and documentation: 60 minutes    Payor: [employer] / Plan: BCBS OUT OF STATE / Product Type: Indemnity /     Procedure Code/s   Therapeutic Exercise (21159): 12 minutes  Evaluation: 34 minutes    I have reviewed the note as documented above.  This accurately captures the substance of my conversation with the patient.  Provider location: [place], [place] ([facility]/State)  Patient location: Home    ___________________________________________________

## 2020-05-19 ENCOUNTER — VIRTUAL VISIT (OUTPATIENT)
Dept: CARDIOLOGY | Facility: CLINIC | Age: 55
End: 2020-05-19
Payer: COMMERCIAL

## 2020-05-19 DIAGNOSIS — I25.10 CORONARY ARTERY DISEASE INVOLVING NATIVE CORONARY ARTERY OF NATIVE HEART WITHOUT ANGINA PECTORIS: Primary | ICD-10-CM

## 2020-05-19 DIAGNOSIS — E78.2 MIXED HYPERLIPIDEMIA: ICD-10-CM

## 2020-05-19 PROCEDURE — 99213 OFFICE O/P EST LOW 20 MIN: CPT | Mod: 95 | Performed by: INTERNAL MEDICINE

## 2020-05-19 NOTE — LETTER
5/19/2020      RE: Fallon Pizano  [address]  [facility] [address]       Dear Colleague,    Thank you for the opportunity to participate in the care of your patient, Fallon Pizano, at the [facility]. Please see a copy of my visit note below.          Interval History:     The patient is a very pleasant 54 year old whom I have been following for history of coronary artery disease status post PCI of the proximal LAD in 2011 without any interim cardiac symptoms.  We have been managing her hyperlipidemia and determined that her discomfort was nerve compression related rather than from the statin itself.  She has been tolerating the rosuvastatin well taking it daily at maximum dose.  Her LDL is under the best control we can achieve.    She had neck surgery a couple months ago and is healing very well.  She feels great.  She has no cardiovascular complaints.                Assessment and Plan:           1. Coronary artery disease status post PCI of the LAD 2011    Feeling well without any cardiovascular complaints at this time.      2. Hypertension, excellent control    Continue current medications      3. Hyperlipidemia    On maximum dose statin.  Tolerating well.      Routine follow-up in 1 year, sooner if symptoms arise      This note was transcribed using electronic voice recognition software and there may be typographical errors present.      Please do not hesitate to contact me if you have any questions/concerns.     Sincerely,     Babatunde Hernandez MD

## 2020-05-19 NOTE — PROGRESS NOTES
"Fallon Pizano is a 54 year old female who is being evaluated via a billable video visit.      The patient has been notified of following:     \"This video visit will be conducted via a call between you and your physician/provider. We have found that certain health care needs can be provided without the need for an in-person physical exam.  This service lets us provide the care you need with a video conversation.  If a prescription is necessary we can send it directly to your pharmacy.  If lab work is needed we can place an order for that and you can then stop by our lab to have the test done at a later time.    Video visits are billed at different rates depending on your insurance coverage.  Please reach out to your insurance provider with any questions.    If during the course of the call the physician/provider feels a video visit is not appropriate, you will not be charged for this service.\"    Patient has given verbal consent for Video visit? Yes    How would you like to obtain your AVS? Mail a copy    Patient would like the video invitation sent by: Text to cell phone: [phone]    Will anyone else be joining your video visit? No      Review Of Systems  Skin: negative  Eyes: glasses  Ears/Nose/Throat: negative  Respiratory: No shortness of breath, dyspnea on exertion, cough, or hemoptysis  Cardiovascular: negative  Gastrointestinal: negative  Genitourinary: negative  Musculoskeletal: negative  Neurologic: negative  Psychiatric: negative  Hematologic/Lymphatic/Immunologic: negative  Endocrine: negative and thyroid disorder    Patient reported vitals:  BP: 108/83  Heart rate: 71  Weight: 141 LBS    Jyothi Car CMA    Video-Visit Details    Type of service:  Video Visit    Video Start Time: 9:13 AM  Video End Time: 9:16 AM    Originating Location (pt. Location): Home    Distant Location (provider location):  [facility]     Platform used for Video Visit: " [id number] [address]    Cardiology Progress Note:  Physician location: Home office  Video method used: [employer] aidan    ROS, PMH, PSurgHx, FamHx, SocHx, medication list reviewed in EMR.    Video Exam:  General: No apparent distress. Appears stated age.  Eyes: Normal sclera  Neck: No JVD, right side surgical incision healing well.  Psych: Affect normal, no pressured speech or flights of fancy.  Respiratory: Unlabored, symmetric. Normal rate. No stridor or audible wheezing.  Skin: No facial lesions or bruises.  Musculoskeletal: Symmetric range of movement and coordination bilateral upper extremities, torso and neck.  Abdomen: No guarding  Neurologic: Grossly nonfocal.    The rest of a comprehensive physical examination is deferred due to PHE (public health emergency) video visit restrictions.          Interval History:     The patient is a very pleasant 54 year old whom I have been following for history of coronary artery disease status post PCI of the proximal LAD in 2011 without any interim cardiac symptoms.  We have been managing her hyperlipidemia and determined that her discomfort was nerve compression related rather than from the statin itself.  She has been tolerating the rosuvastatin well taking it daily at maximum dose.  Her LDL is under the best control we can achieve.    She had neck surgery a couple months ago and is healing very well.  She feels great.  She has no cardiovascular complaints.                      Assessment and Plan:           1. Coronary artery disease status post PCI of the LAD 2011    Feeling well without any cardiovascular complaints at this time.      2. Hypertension, excellent control    Continue current medications      3. Hyperlipidemia    On maximum dose statin.  Tolerating well.      Routine follow-up in 1 year, sooner if symptoms arise      This note was transcribed using electronic voice recognition software and there may be typographical errors present.          Babatunde Hernandez,  MD

## 2020-05-26 ENCOUNTER — VIRTUAL VISIT (OUTPATIENT)
Dept: PHYSICAL THERAPY | Facility: CLINIC | Age: 55
End: 2020-05-26
Payer: COMMERCIAL

## 2020-05-26 DIAGNOSIS — M43.22 CERVICAL VERTEBRAL FUSION: ICD-10-CM

## 2020-05-26 DIAGNOSIS — Z47.89 AFTERCARE FOLLOWING SURGERY OF THE MUSCULOSKELETAL SYSTEM: ICD-10-CM

## 2020-05-26 PROCEDURE — 97530 THERAPEUTIC ACTIVITIES: CPT | Mod: 95 | Performed by: PHYSICAL THERAPIST

## 2020-05-26 PROCEDURE — 97110 THERAPEUTIC EXERCISES: CPT | Mod: 95 | Performed by: PHYSICAL THERAPIST

## 2020-05-26 NOTE — PROGRESS NOTES
"Physical Therapy Virtual Follow Up Visit      The patient has been notified of following:     \"This virtual visit will be conducted between you and your provider. We have found that certain health care needs can be provided without the need for physical presence.  This service lets us provide the care you need with a virtual visit.\"    Due to external, as well as internal [facility] management of the COVID-19 Virus, Fallon Pizano was not seen in our clinic.  As a substitution, we implemented a virtual visit to manage this patient's condition utilizing the PTRx virtual visit platform via the patient s existing code.  The provider, Amita Gamble, reviewed the patient's chart, PTRx prescription, and spoke with the patient to determine the following telemedicine visit is appropriate and effective for the patient's care.    The following type of visit was completed:   Video Visit:  The PTRx platform uses a synchronous HIPAA compliant video stream for this patient encounter.        S: Fallon Pizano is a 54 year old female. Connected virtually on the PTRx platform to discuss their condition/progress. They noted improvements in cervical ROM and pain.  Patient reports, \"Since my last PT visit, I have been doing great.\"  She has been doing the cervical retraction exercises several times/day and has been surprised and pleased with how much they have helped.  She reports pain mostly in the R scapula, shoulder, and neck now and has minimal pain in the R forearm and fingers--3% of the time.  She has been doing more around the house overall, and most of this has been fine, however, she did note R scapula and shoulder soreness with trying to vacuum.       Current pain level: 4/10      O: Patient demonstrated Cervical AROM:  R rotation 75%, increase R neck/scapular pain; L rotation 85%, NE; extension 25%, \"stiff\", increase R neck pain; fleion 25%, B SB 50%, \"stiff\"   R shoulder AROM:  Flexion and abduction " ~80 degrees.      PTRx Content from today's visit:  Exercise Name: Cervical Retraction - Reps: 10 - do not add hands to chin, do actively instead - Sessions: 3-4, Notes: **STOP if right arm symptoms are worsening overall  Performed 2 sets of 10 reps cervical retraction--good technique but cues to decrease speed.    2) Walking -  5-15 minutes (outside vs treadmill preferred) 1-2x/day - do not increase your pain--continue  Exercise Name: Posture Correction with Lumbar Roll  Exercise Name: Icing, Notes: 10-12 minutes back of the neck as needed for pain  Exercise Name: Virtual Visit Tips for Patients  Exercise Name: Scapular Retraction/Depression - Reps: 10 - Sessions: 3-    Therapeutic activity:  x10'--discuss expected response in terms of R shoulder/UE movement and strength return, healing of nerves and timeframes, avoidance of aggravating activities to promote healing--patient demonstrated good understanding.     A:   Patient's symptoms are resolving.  Response to therapy has shown an improvement in  pain level, radicular symptoms, ROM  and function      P: Patient will continue with the exercise program assigned on their PTRx code and will add the following measures to manage their pain/condition: Continue cervical retraction and posture as previously.     Continue current treatment plan until patient demonstrates readiness to progress to higher level exercises.      Virtual visit contact time    Time of service began: 8:42 AM  Time of service ended: 9:06 AM  Total Time for set up, visit, and documentation: 32 minutes    Payor: BCBS / Plan: BCBS OUT OF STATE / Product Type: Indemnity /   .  Procedure Code/s   Therapeutic Exercise (61591): 10 minutes  Neuromuscular Re-education (79729): 4 minutes  Therapeutic Activities (07180): 10 minutes    I have reviewed the note as documented above.  This accurately captures the substance of my conversation with the patient.  Provider location: [place], [place]  (City/State)  Patient location: home

## 2020-05-27 DIAGNOSIS — G47.00 INSOMNIA, UNSPECIFIED TYPE: ICD-10-CM

## 2020-05-28 RX ORDER — ZOLPIDEM TARTRATE 5 MG/1
5 TABLET ORAL
Qty: 30 TABLET | Refills: 5 | Status: SHIPPED | OUTPATIENT
Start: 2020-05-28 | End: 2020-12-22

## 2020-05-28 NOTE — TELEPHONE ENCOUNTER
zolpidem      Last Written Prescription Date:  3/31/20  Last Fill Quantity: 30,   # refills: 1  Last Office Visit: 3/9/20  Future Office visit:    Next 5 appointments (look out 90 days)    Jun 22, 2020  9:00 AM CDT  Return Visit with Kindra Henson NP  [facility] ([facility]) [address] [place] [id number] [phone]           Routing refill request to provider for review/approval because:  Drug not on the [facility], [facility] or [facility] refill protocol or controlled substance

## 2020-06-02 ENCOUNTER — VIRTUAL VISIT (OUTPATIENT)
Dept: PHYSICAL THERAPY | Facility: CLINIC | Age: 55
End: 2020-06-02
Payer: COMMERCIAL

## 2020-06-02 DIAGNOSIS — M43.22 CERVICAL VERTEBRAL FUSION: ICD-10-CM

## 2020-06-02 DIAGNOSIS — Z47.89 AFTERCARE FOLLOWING SURGERY OF THE MUSCULOSKELETAL SYSTEM: ICD-10-CM

## 2020-06-02 PROCEDURE — 97110 THERAPEUTIC EXERCISES: CPT | Mod: 95 | Performed by: PHYSICAL THERAPIST

## 2020-06-02 NOTE — PROGRESS NOTES
"Physical Therapy Virtual Follow Up Visit      The patient has been notified of following:     \"This virtual visit will be conducted between you and your provider. We have found that certain health care needs can be provided without the need for physical presence.  This service lets us provide the care you need with a virtual visit.\"    Due to external, as well as internal [facility] management of the COVID-19 Virus, Fallon Pizano was not seen in our clinic.  As a substitution, we implemented a virtual visit to manage this patient's condition utilizing the JoggleBugx virtual visit platform via the patient s existing code.  The provider, Rubia Mike, reviewed the patient's chart, PTRx prescription, and spoke with the patient to determine the following telemedicine visit is appropriate and effective for the patient's care.    The following type of visit was completed:   Video Visit:  The JoggleBugx platform uses a synchronous HIPAA compliant video stream for this patient encounter.        S: Fallon Pizano is a 54 year old female. Connected virtually on the JoggleBugx platform to discuss their condition/progress. Improving.  Pain is right scapula 0-3/10.  She has more movement in the right arm as well.  Dressing and  Doing active retractions every couple hours.  Sleeping well with Ambien - sleeping through the night.    Current pain level: 2/10 right scapula.    O: AROM cervical left rotation 66% bilaterally, ext and flexion 25%, pain with right rotation only.   Right shoulder AROM:  Flexion 90 degrees, \"stiff/weak\", abduction 120 with pain past 90 degrees (in scapula).     Review of seated cervical retraction without hand assist and with light hand assist.  Encouraged to continue minimum of every 2 hours in addition to when she gets onset of scapular pain.   Wand shoulder flexion:  Supine in painfree arc x 10 - to add to HEP 2x/day   Scap retraction in standing:  Corrected technique, retraction vs " "shrug/shoulder Beaver; produced slight numbness 4th digit on right, abolished within 5 minutes.  Patient to do (corrected) 10 reps 2x/day, 2-3\" hold .     A    PTRx Content from today's visit:  Exercise Name: Cervical Retraction - Reps: 10 - do not add hands to chin, do actively instead - Sessions: 3-4, Notes: **STOP if right arm symptoms are worsening overall    2) Walking -  5-15 minutes (outside vs treadmill preferred) 1-2x/day - do not increase your pain  Exercise Name: Posture Correction with Lumbar Roll  Exercise Name: Scapular Retraction/Depression - Reps: 10 - Sessions: 2  Exercise Name: Wand Shoulder Flexion Supine - Reps: 10 - Sessions: 2, Notes: NO PAIN - do as much motion as able without increasing reggie    A:   Patient's symptoms are resolving.  Response to therapy has shown an improvement in  pain level, radicular symptoms, ROM  and function      P: Patient will continue with the exercise program assigned on their PTRx code and will add the following measures to manage their pain/condition: shoulder wand flexion, corrected scap retraction with focus of HEP continue to be cervical retraction.     Current treatment program is being advanced to more complex exercises.      Virtual visit contact time    Time of service began: 8:43 AM  Time of service ended: 9:13 AM  Total Time for set up, visit, and documentation: 37 minutes    Payor: KINZA / Plan: BCBS OUT OF STATE / Product Type: Indemnity /   .  Procedure Code/s   Therapeutic Exercise (20531): 30 minutes    I have reviewed the note as documented above.  This accurately captures the substance of my conversation with the patient.  Provider location: [place], [place] ([facility])  Patient location: Home    ADDENDUM 11-24-20:  Will discharge PT chart at this time as patient did not return for follow-up visits.  See above note for last known status.  Rubia Mike PT           "

## 2020-06-17 ENCOUNTER — VIRTUAL VISIT (OUTPATIENT)
Dept: PHYSICAL THERAPY | Facility: CLINIC | Age: 55
End: 2020-06-17
Payer: COMMERCIAL

## 2020-06-17 DIAGNOSIS — Z47.89 AFTERCARE FOLLOWING SURGERY OF THE MUSCULOSKELETAL SYSTEM: ICD-10-CM

## 2020-06-17 DIAGNOSIS — M43.22 CERVICAL VERTEBRAL FUSION: ICD-10-CM

## 2020-06-17 NOTE — PROGRESS NOTES
"  PROGRESS  REPORT    Physical Therapy Virtual Follow Up Visit      Progress reporting period is from 5-18-20 to 6-17-20.       The patient has been notified of following:     \"This virtual visit will be conducted between you and your provider. We have found that certain health care needs can be provided without the need for physical presence.  This service lets us provide the care you need with a virtual visit.\"    Due to external, as well as internal [facility] management of the COVID-19 Virus, Fallon Pizano was not seen in our clinic.  As a substitution, we implemented a virtual visit to manage this patient's condition utilizing the SnapLogicx virtual visit platform via the patient s existing code.  The provider, Rubia Mike, reviewed the patient's chart, PTRx prescription, and spoke with the patient to determine the following telemedicine visit is appropriate and effective for the patient's care.    The following type of visit was completed:   Video Visit:  The SnapLogicx platform uses a synchronous HIPAA compliant video stream for this patient encounter.      SUBJECTIVE  Subjective changes noted by patient:  Fallon Pizano is a 55 year old female. Connected virtually on the PTRx platform to discuss their condition/progress.    At initial evaluation patient had c/o constant pain right upper trapezius, right shoulder and upper arm to the forearm and right 3rd/4th digits.  Pain ranged 4-6/10, described as \"dull\" and she continued to have feeling of \"weakess\" right UE.   Patient improved and was getting intermittent right scapular pain only at time of last PT visit, in addition to continued difficulty raising right UE.    Today she reports right scapular pain has been more in the last couple weeks - near constant, up to 4/10.  Doing retraction 4x/day (active), scapular retractions and doing wand flexion 1x/day.  She admits the last 2 weeks on her own she has also been trying to \"work the right " "arm\" by moving into abduction and has been pushing into pain with wand flexion exercise as well as starting neck ROM into extension.  She is sleeping well.   She continues to have difficulty with all overhead activities with the right UE, making hair care difficult.    Current pain level is 0/10  At rest/no movement.     Previous pain level was  4-6/10  Changes in function:  Yes (See Goal flowsheet attached for changes in current functional level)  Adverse reaction to treatment or activity: activity - pushing into cervical extension, shoulder flexion with wand and \"working\" the right shoulder into abduction    OBJECTIVE  Cervical AROM:   Flexion: 25%, produce right scapular pain  Extension:  25%  Left rotation 66%  Right rotation:  50% with increase right scapular pain     Right shoulder flexion 90 degrees with pain, abduction 95 degrees with pain, ER 50%    Corrected sitting posture:  No symptoms  Seated cervical retraction:  No effect  Seated cervical retraction with gentle pt overpressure:  Produce right scapular pain, no worse, with further reps no longer produced pain; after increase right cervical rotation AROM with nil symptoms right scapula and increase of right shoulder flexion.  Reminder to do overpressure into retraction with HEP, increase frequency to every 2 hours, HOLD on extension she was doing on her own for now    Review and corrected shoulder wand flexion supine - to do in painfree ROM only, did x 10; to do 1-2x/day   Trial right shoulder wand abduction in standing - pain - hold   Right shoulder wand ER sitting next to table, arm supported on table:  x10 in painfree ROM - to add to HEP 1-2x/day         PTRx Content from today's visit:  Exercise Name: Cervical Retraction - Reps: 10 - Sessions: 3-4, Notes: **STOP if right arm symptoms are worsening overall  2) Walking -  5-15 minutes (outside vs treadmill preferred) 1-2x/day - do not increase your pain  Exercise Name: Posture Correction with Lumbar " Roll  Exercise Name: Scapular Retraction/Depression - Reps: 10 - Sessions: 2  Exercise Name: Wand Shoulder Flexion Supine - Reps: 10 - Sessions: 2, Notes: NO PAIN - do as much motion as able without increasing pain  Exercise Name: Supported Shoulder External Rotation - Reps: 10 - use yardstick to push right hand - Sessions: 1-2, Notes: NO ELAN    ASSESSMENT/PLAN  Updated problem list and treatment plan: Diagnosis 1:  S/p cervical fusion    Pain -  self management, education and home program  Decreased ROM/flexibility - therapeutic exercise and home program  Decreased strength - therapeutic exercise, therapeutic activities and home program  Decreased function - therapeutic activities and home program  STG/LTGs have been met or progress has been made towards goals:  Yes (See Goal flow sheet completed today.)  Assessment of Progress: The patient's condition is improving.  The patient has had set backs in their progress.  Self Management Plans:  Patient has been instructed in a home treatment program.  I have re-evaluated this patient and find that the nature, scope, duration and intensity of the therapy is appropriate for the medical condition of the patient.  Fallon continues to require the following intervention to meet STG and LTG's:  PT    Recommendations:  Patient will continue with the exercise program assigned on their PTRx code.  This patient would benefit from continued therapy.     Frequency:  1 X week, once daily  Duration:  for 4 weeks to progress with HEP          Virtual visit contact time    Time of service began: 9:25 AM  Time of service ended: 10:02 AM  Total Time for set up, visit, and documentation: 47 minutes    Payor: BCBS / Plan: BCBS OUT OF STATE / Product Type: Indemnity /     Procedure Code/s   Therapeutic Exercise (12654): 27 minutes  Neuromuscular Re-education (42853): 10 minutes    I have reviewed the note as documented above.  This accurately captures the substance of my conversation with  the patient.  Provider location: [place] ([facility]/State)  Patient location: Home

## 2020-06-21 DIAGNOSIS — I25.10 CORONARY ARTERY DISEASE INVOLVING NATIVE CORONARY ARTERY OF NATIVE HEART WITHOUT ANGINA PECTORIS: ICD-10-CM

## 2020-06-21 DIAGNOSIS — E78.5 HYPERLIPIDEMIA LDL GOAL <70: ICD-10-CM

## 2020-06-22 ENCOUNTER — HOSPITAL ENCOUNTER (OUTPATIENT)
Dept: GENERAL RADIOLOGY | Facility: CLINIC | Age: 55
End: 2020-06-22
Attending: NURSE PRACTITIONER
Payer: COMMERCIAL

## 2020-06-22 ENCOUNTER — OFFICE VISIT (OUTPATIENT)
Dept: NEUROSURGERY | Facility: CLINIC | Age: 55
End: 2020-06-22
Attending: NURSE PRACTITIONER
Payer: COMMERCIAL

## 2020-06-22 VITALS
DIASTOLIC BLOOD PRESSURE: 78 MMHG | HEART RATE: 65 BPM | BODY MASS INDEX: 23.39 KG/M2 | WEIGHT: 137 LBS | SYSTOLIC BLOOD PRESSURE: 116 MMHG | TEMPERATURE: 98.3 F | OXYGEN SATURATION: 99 % | HEIGHT: 64 IN

## 2020-06-22 DIAGNOSIS — Z98.1 S/P CERVICAL SPINAL FUSION: Primary | ICD-10-CM

## 2020-06-22 DIAGNOSIS — Z98.1 S/P CERVICAL SPINAL FUSION: ICD-10-CM

## 2020-06-22 PROCEDURE — 72040 X-RAY EXAM NECK SPINE 2-3 VW: CPT

## 2020-06-22 PROCEDURE — G0463 HOSPITAL OUTPT CLINIC VISIT: HCPCS

## 2020-06-22 PROCEDURE — 99024 POSTOP FOLLOW-UP VISIT: CPT | Performed by: NURSE PRACTITIONER

## 2020-06-22 RX ORDER — ROSUVASTATIN CALCIUM 40 MG/1
TABLET, COATED ORAL
Qty: 90 TABLET | Refills: 1 | Status: SHIPPED | OUTPATIENT
Start: 2020-06-22 | End: 2020-12-22

## 2020-06-22 ASSESSMENT — MIFFLIN-ST. JEOR: SCORE: 1196.43

## 2020-06-22 ASSESSMENT — PAIN SCALES - GENERAL: PAINLEVEL: NO PAIN (0)

## 2020-06-22 NOTE — LETTER
"    6/22/2020         RE: Fallon Pizano  [address]  [facility] [address]        Dear Colleague,    Thank you for referring your patient, Fallon Pizano, to the [facility] NEUROS[facility]GERY [facility]INIC. Please see a copy of my visit note below.    [facility] Neurosurgery  Neurosurgery Followup:    HPI: 3 months s/p C4-7 ACDF. Doing well. Improvement in pain. Continues to note improvement in right arm/shoulder strength. Continues to undergo physical therapy. No new or worsening symptoms.    Medical, surgical, family, and social history unchanged since prior exam.    Exam:  Constitutional:  Alert, well nourished, NAD.  HEENT: Normocephalic, atraumatic.   Pulm:  Without shortness of breath   CV:  No pitting edema of BLE.     Vital Signs:  /78   Pulse 65   Temp 98.3  F (36.8  C)   Ht 5' 4\" (1.626 m)   Wt 137 lb (62.1 kg)   LMP 07/18/2013   SpO2 99%   BMI 23.52 kg/m      Neurological:  Awake  Alert  Oriented x 3  Motor exam:     Shoulder Abduction:  Right:  5/5    Left:  5/5  Biceps:                      Right:  5/5    Left:  5/5  Triceps:                     Right:  5/5    Left:  5/5  Wrist Extensors:       Right:  5/5    Left:  5/5  Wrist Flexors:           Right:  5/5    Left:  5/5  Intrinsics:                  Right:  5/5    Left:  5/5     Able to spontaneously move U/E bilaterally  Sensation intact throughout all U/E dermatomes    Incisions:  Healing nicely.    Imaging:  Will obtain following visit.    A/P: 3 months s/p C4-7 ACDF. Doing well. Improvement in pain. Continues to note improvement in right arm/shoulder strength. Continues to undergo physical therapy. No new or worsening symptoms. Will obtain cervical XR today. I will contact her if the results are abnormal. May increase activity as tolerated. Follow up in 3 months with cervical XR prior. She verbalized understanding and agreement.    Patient Instructions   - Cervical xray today. I will contact you if the " results are abnormal.    - May increase lifting restriction and activity as tolerated.    - Follow up in 3 months with xray prior     - Call the clinic at [phone] for increased pain or any other questions and concerns.    Kindra Henson, CHEYANNE  [facility] Neurosurgery  [address] [place] [address]  Tel [phone]  Fax [phone]      Again, thank you for allowing me to participate in the care of your patient.        Sincerely,        Kindra Henson, NP

## 2020-06-22 NOTE — NURSING NOTE
"Fallon Pizano is a 55 year old female who presents for:  Chief Complaint   Patient presents with     Neurologic Problem     90 day f/u C4-7        Initial Vitals:  /78   Pulse 65   Temp 98.3  F (36.8  C)   Ht 5' 4\" (1.626 m)   Wt 137 lb (62.1 kg)   LMP 07/18/2013   SpO2 99%   BMI 23.52 kg/m   Estimated body mass index is 23.52 kg/m  as calculated from the following:    Height as of this encounter: 5' 4\" (1.626 m).    Weight as of this encounter: 137 lb (62.1 kg).. Body surface area is 1.67 meters squared. BP completed using cuff size: regular  No Pain (0)    Nursing Comments: Patient presents for her 90 day f/u C4-7    Corey Bernal MA  "

## 2020-06-22 NOTE — PATIENT INSTRUCTIONS
- Cervical xray today. I will contact you if the results are abnormal.    - May increase lifting restriction and activity as tolerated.    - Follow up in 3 months with xray prior     - Call the clinic at [phone] for increased pain or any other questions and concerns.

## 2020-06-22 NOTE — PROGRESS NOTES
"[facility] Neurosurgery  Neurosurgery Followup:    HPI: 3 months s/p C4-7 ACDF. Doing well. Improvement in pain. Continues to note improvement in right arm/shoulder strength. Continues to undergo physical therapy. No new or worsening symptoms.    Medical, surgical, family, and social history unchanged since prior exam.    Exam:  Constitutional:  Alert, well nourished, NAD.  HEENT: Normocephalic, atraumatic.   Pulm:  Without shortness of breath   CV:  No pitting edema of BLE.     Vital Signs:  /78   Pulse 65   Temp 98.3  F (36.8  C)   Ht 5' 4\" (1.626 m)   Wt 137 lb (62.1 kg)   LMP 07/18/2013   SpO2 99%   BMI 23.52 kg/m      Neurological:  Awake  Alert  Oriented x 3  Motor exam:     Shoulder Abduction:  Right:  5/5    Left:  5/5  Biceps:                      Right:  5/5    Left:  5/5  Triceps:                     Right:  5/5    Left:  5/5  Wrist Extensors:       Right:  5/5    Left:  5/5  Wrist Flexors:           Right:  5/5    Left:  5/5  Intrinsics:                  Right:  5/5    Left:  5/5     Able to spontaneously move U/E bilaterally  Sensation intact throughout all U/E dermatomes    Incisions:  Healing nicely.    Imaging:  Will obtain following visit.    A/P: 3 months s/p C4-7 ACDF. Doing well. Improvement in pain. Continues to note improvement in right arm/shoulder strength. Continues to undergo physical therapy. No new or worsening symptoms. Will obtain cervical XR today. I will contact her if the results are abnormal. May increase activity as tolerated. Follow up in 3 months with cervical XR prior. She verbalized understanding and agreement.    Patient Instructions   - Cervical xray today. I will contact you if the results are abnormal.    - May increase lifting restriction and activity as tolerated.    - Follow up in 3 months with xray prior     - Call the clinic at [phone] for increased pain or any other questions and concerns.    Kindra Henson, [facility] " Neurosurgery  [address]  [address]  [place] [address]  Tel [phone]  Fax [phone]

## 2020-07-27 DIAGNOSIS — I25.10 CORONARY ARTERY DISEASE INVOLVING NATIVE CORONARY ARTERY OF NATIVE HEART WITHOUT ANGINA PECTORIS: ICD-10-CM

## 2020-07-28 RX ORDER — NITROGLYCERIN 0.4 MG/1
TABLET SUBLINGUAL
Qty: 25 TABLET | Refills: 0 | Status: SHIPPED | OUTPATIENT
Start: 2020-07-28 | End: 2022-03-22

## 2020-09-28 ENCOUNTER — ANCILLARY PROCEDURE (OUTPATIENT)
Dept: GENERAL RADIOLOGY | Facility: CLINIC | Age: 55
End: 2020-09-28
Attending: NURSE PRACTITIONER
Payer: COMMERCIAL

## 2020-09-28 ENCOUNTER — OFFICE VISIT (OUTPATIENT)
Dept: NEUROSURGERY | Facility: CLINIC | Age: 55
End: 2020-09-28
Attending: NURSE PRACTITIONER
Payer: COMMERCIAL

## 2020-09-28 VITALS
WEIGHT: 136 LBS | DIASTOLIC BLOOD PRESSURE: 69 MMHG | BODY MASS INDEX: 23.22 KG/M2 | SYSTOLIC BLOOD PRESSURE: 103 MMHG | TEMPERATURE: 98.3 F | OXYGEN SATURATION: 97 % | HEART RATE: 80 BPM | HEIGHT: 64 IN

## 2020-09-28 DIAGNOSIS — Z98.1 S/P CERVICAL SPINAL FUSION: ICD-10-CM

## 2020-09-28 DIAGNOSIS — Z98.1 S/P CERVICAL SPINAL FUSION: Primary | ICD-10-CM

## 2020-09-28 PROCEDURE — 72040 X-RAY EXAM NECK SPINE 2-3 VW: CPT

## 2020-09-28 PROCEDURE — 99213 OFFICE O/P EST LOW 20 MIN: CPT | Performed by: NURSE PRACTITIONER

## 2020-09-28 PROCEDURE — G0463 HOSPITAL OUTPT CLINIC VISIT: HCPCS

## 2020-09-28 ASSESSMENT — MIFFLIN-ST. JEOR: SCORE: 1191.89

## 2020-09-28 ASSESSMENT — PAIN SCALES - GENERAL: PAINLEVEL: MILD PAIN (3)

## 2020-09-28 NOTE — PATIENT INSTRUCTIONS
Follow-up in 6 months with xray prior     Please call our clinic with any post op questions or concerns    Radha Marin CNP  [facility] Neurosurgery  [address] [place] [address]  Tel [phone]  Pager [phone]

## 2020-09-28 NOTE — LETTER
"    9/28/2020         RE: Fallon Pizano  [address]  [place] [address]        Dear Colleague,    Thank you for referring your patient, Fallon Pizano, to the [facility] NEUROSURGERY CLINIC. Please see a copy of my visit note below.    [facility] Neurosurgery Follow-Up:     HPI: 6 months s/p C4-7 ACDF with Dr. Arriaza. Doing well. Reports her arm pain has improved. She did have a fall yesterday while cleaning out her camper, which has caused some muscular soreness in her shoulders. Reports pain 3/10 today. Denies any weakness or paresthesias. She takes Ibuprofen as needed.    Exam:  Constitutional:  Alert, well nourished, NAD.  HEENT: Normocephalic, atraumatic.   Pulm:  Without shortness of breath   CV:  No pitting edema of BLE.     Vital Signs: /69   Pulse 80   Temp 98.3  F (36.8  C)   Ht 5' 4\" (1.626 m)   Wt 136 lb (61.7 kg)   LMP 07/18/2013   SpO2 97%   BMI 23.34 kg/m      Neurological:  Awake  Alert  Oriented x 3  Motor exam:     Shoulder Abduction:  Right:  5/5    Left:  5/5  Biceps:                      Right:  5/5    Left:  5/5  Triceps:                     Right:  5/5    Left:  5/5  Wrist Extensors:       Right:  5/5    Left:  5/5  Wrist Flexors:           Right:  5/5    Left:  5/5  Intrinsics:                  Right:  5/5    Left:  5/5     Able to spontaneously move U/E bilaterally  Sensation intact throughout all U/E dermatomes    Incision: Well healed  Imaging: AP and lateral views of cervical spine xray show stable hardware.     A/P: 6 months s/p C4-7 ACDF    Continue to monitor shoulder soreness. Call clinic if symptoms persist, change, or worsen.    Follow-up in 6 months with xray prior.    Please call our clinic with any post op questions or concerns.      Radha Marin, CHEYANNE  [facility] Neurosurgery  [address] [place] [address]  Tel [phone]  Pager [phone]        Again, thank " you for allowing me to participate in the care of your patient.        Sincerely,        Radha Marin NP

## 2020-09-28 NOTE — NURSING NOTE
"Fallon Pizano is a 55 year old female who presents for:  Chief Complaint   Patient presents with     Neck Pain     Cervical        Initial Vitals:  /69   Pulse 80   Temp 98.3  F (36.8  C)   Ht 5' 4\" (1.626 m)   Wt 136 lb (61.7 kg)   LMP 07/18/2013   SpO2 97%   BMI 23.34 kg/m   Estimated body mass index is 23.34 kg/m  as calculated from the following:    Height as of this encounter: 5' 4\" (1.626 m).    Weight as of this encounter: 136 lb (61.7 kg).. Body surface area is 1.67 meters squared. BP completed using cuff size: regular  Mild Pain (3)    Nursing Comments: Patient presents w/ neck pain    Corey Bernal MA  "

## 2020-09-28 NOTE — PROGRESS NOTES
"[facility]surgery Follow-Up:     HPI: 6 months s/p C4-7 ACDF with Dr. Arriaza. Doing well. Reports her arm pain has improved. She did have a fall yesterday while cleaning out her camper, which has caused some muscular soreness in her shoulders. Reports pain 3/10 today. Denies any weakness or paresthesias. She takes Ibuprofen as needed.    Exam:  Constitutional:  Alert, well nourished, NAD.  HEENT: Normocephalic, atraumatic.   Pulm:  Without shortness of breath   CV:  No pitting edema of BLE.     Vital Signs: /69   Pulse 80   Temp 98.3  F (36.8  C)   Ht 5' 4\" (1.626 m)   Wt 136 lb (61.7 kg)   LMP 07/18/2013   SpO2 97%   BMI 23.34 kg/m      Neurological:  Awake  Alert  Oriented x 3  Motor exam:     Shoulder Abduction:  Right:  5/5    Left:  5/5  Biceps:                      Right:  5/5    Left:  5/5  Triceps:                     Right:  5/5    Left:  5/5  Wrist Extensors:       Right:  5/5    Left:  5/5  Wrist Flexors:           Right:  5/5    Left:  5/5  Intrinsics:                  Right:  5/5    Left:  5/5     Able to spontaneously move U/E bilaterally  Sensation intact throughout all U/E dermatomes    Incision: Well healed  Imaging: AP and lateral views of cervical spine xray show stable hardware.     A/P: 6 months s/p C4-7 ACDF    Continue to monitor shoulder soreness. Call clinic if symptoms persist, change, or worsen.    Follow-up in 6 months with xray prior.    Please call our clinic with any post op questions or concerns.      Radha Marin, CHEYANNE  [facility]surgery  [address] [place] [address]  Tel [phone]  Pager [phone]      "

## 2020-10-12 ASSESSMENT — ENCOUNTER SYMPTOMS
PALPITATIONS: 0
CONSTIPATION: 0
FREQUENCY: 0
NAUSEA: 0
HEMATURIA: 0
SORE THROAT: 0
DIZZINESS: 0
JOINT SWELLING: 0
FEVER: 0
DIARRHEA: 0
COUGH: 0
MYALGIAS: 0
ABDOMINAL PAIN: 0
PARESTHESIAS: 0
WEAKNESS: 0
ARTHRALGIAS: 0
SHORTNESS OF BREATH: 0
EYE PAIN: 0
NERVOUS/ANXIOUS: 0
HEADACHES: 0
DYSURIA: 0
HEARTBURN: 0
BREAST MASS: 0
HEMATOCHEZIA: 0
CHILLS: 0

## 2020-10-13 ENCOUNTER — OFFICE VISIT (OUTPATIENT)
Dept: INTERNAL MEDICINE | Facility: CLINIC | Age: 55
End: 2020-10-13
Payer: COMMERCIAL

## 2020-10-13 VITALS
HEIGHT: 63 IN | DIASTOLIC BLOOD PRESSURE: 64 MMHG | TEMPERATURE: 98.3 F | OXYGEN SATURATION: 100 % | HEART RATE: 98 BPM | SYSTOLIC BLOOD PRESSURE: 96 MMHG | WEIGHT: 135 LBS | RESPIRATION RATE: 16 BRPM | BODY MASS INDEX: 23.92 KG/M2

## 2020-10-13 DIAGNOSIS — E03.9 ACQUIRED HYPOTHYROIDISM: ICD-10-CM

## 2020-10-13 DIAGNOSIS — I25.10 CORONARY ARTERY DISEASE INVOLVING NATIVE CORONARY ARTERY OF NATIVE HEART WITHOUT ANGINA PECTORIS: ICD-10-CM

## 2020-10-13 DIAGNOSIS — Z12.31 ENCOUNTER FOR SCREENING MAMMOGRAM FOR BREAST CANCER: ICD-10-CM

## 2020-10-13 DIAGNOSIS — F33.0 MILD EPISODE OF RECURRENT MAJOR DEPRESSIVE DISORDER (H): ICD-10-CM

## 2020-10-13 DIAGNOSIS — E78.5 HYPERLIPIDEMIA LDL GOAL <70: ICD-10-CM

## 2020-10-13 DIAGNOSIS — Z23 NEED FOR PROPHYLACTIC VACCINATION AND INOCULATION AGAINST INFLUENZA: ICD-10-CM

## 2020-10-13 DIAGNOSIS — Z00.00 ENCOUNTER FOR ROUTINE ADULT HEALTH EXAMINATION WITHOUT ABNORMAL FINDINGS: Primary | ICD-10-CM

## 2020-10-13 DIAGNOSIS — R73.09 ABNORMAL BLOOD SUGAR: ICD-10-CM

## 2020-10-13 PROCEDURE — 99213 OFFICE O/P EST LOW 20 MIN: CPT | Mod: 25 | Performed by: INTERNAL MEDICINE

## 2020-10-13 PROCEDURE — 90471 IMMUNIZATION ADMIN: CPT | Performed by: INTERNAL MEDICINE

## 2020-10-13 PROCEDURE — 90686 IIV4 VACC NO PRSV 0.5 ML IM: CPT | Performed by: INTERNAL MEDICINE

## 2020-10-13 PROCEDURE — 99396 PREV VISIT EST AGE 40-64: CPT | Mod: 25 | Performed by: INTERNAL MEDICINE

## 2020-10-13 ASSESSMENT — ENCOUNTER SYMPTOMS
COUGH: 0
ARTHRALGIAS: 0
BREAST MASS: 0
SORE THROAT: 0
JOINT SWELLING: 0
CHILLS: 0
HEMATURIA: 0
WEAKNESS: 0
FREQUENCY: 0
DIARRHEA: 0
HEADACHES: 0
HEMATOCHEZIA: 0
DIZZINESS: 0
PALPITATIONS: 0
FEVER: 0
MYALGIAS: 0
SHORTNESS OF BREATH: 0
DYSURIA: 0
NAUSEA: 0
NERVOUS/ANXIOUS: 0
ABDOMINAL PAIN: 0
HEARTBURN: 0
PARESTHESIAS: 0
CONSTIPATION: 0
EYE PAIN: 0

## 2020-10-13 ASSESSMENT — MIFFLIN-ST. JEOR: SCORE: 1171.49

## 2020-10-13 NOTE — PATIENT INSTRUCTIONS
Shingrix is the new shingles vaccine. Call insurance to find out if covered, whether covered and if covered better at a pharmacy or doctor's office and the copay.

## 2020-10-13 NOTE — PROGRESS NOTES
SUBJECTIVE:   CC: Fallon Pizano is an 55 year old woman who presents for preventive health visit.       Patient has been advised of split billing requirements and indicates understanding: Yes  Healthy Habits:     Getting at least 3 servings of Calcium per day:  Yes    Bi-annual eye exam:  Yes    Dental care twice a year:  NO    Sleep apnea or symptoms of sleep apnea:  None    Diet:  Low salt and Low fat/cholesterol    Frequency of exercise:  None    Taking medications regularly:  Yes    Medication side effects:  None    PHQ-2 Total Score: 4    Additional concerns today:  No      Today's PHQ-2 Score:   PHQ-2 (  [employer]) 10/12/2020   Q1: Little interest or pleasure in doing things 2   Q2: Feeling down, depressed or hopeless 2   PHQ-2 Score 4   Q1: Little interest or pleasure in doing things More than half the days   Q2: Feeling down, depressed or hopeless More than half the days   PHQ-2 Score 4       Abuse: Current or Past (Physical, Sexual or Emotional) - No  Do you feel safe in your environment? Yes    Have you ever done Advance Care Planning? (For example, a Health Directive, POLST, or a discussion with a medical provider or your loved ones about your wishes): No, advance care planning information given to patient to review.  Patient declined advance care planning discussion at this time.    Social History     Tobacco Use     Smoking status: Former Smoker     Types: Cigarettes     Quit date: 3/10/2020     Years since quittin.5     Smokeless tobacco: Never Used     Tobacco comment: smokes occ   Substance Use Topics     Alcohol use: Yes     Alcohol/week: 0.0 standard drinks     Comment: Casual         Alcohol Use 10/12/2020   Prescreen: >3 drinks/day or >7 drinks/week? No   Prescreen: >3 drinks/day or >7 drinks/week? -       Reviewed orders with patient.  Reviewed health maintenance and updated orders accordingly - Yes      Mammogram Screening: Patient over age 50, mutual decision to screen  reflected in health maintenance.    Pertinent mammograms are reviewed under the imaging tab.  History of abnormal Pap smear: NO - age 30-65 PAP every 5 years with negative HPV co-testing recommended  PAP / HPV Latest Ref Rng & Units 4/6/2017 7/29/2013   PAP - NIL OTHER-NIL, See Result   HPV 16 DNA NEG Negative -   HPV 18 DNA NEG Negative -   OTHER HR HPV NEG Negative -     Reviewed and updated as needed this visit by clinical staff  Tobacco  Allergies  Meds   Med Hx  Surg Hx  Fam Hx          Problems:  1.  Coronary artery disease: Currently stable, follows with cardiology, stable on medication  2.  Hyperlipidemia: Last labs 6 months ago are doing well.  3.  Abnormal sugar: Stable  4.  Mild major depression: Overall doing well.  5.  Hypothyroidism: Clinically stable    Patient Active Problem List   Diagnosis     Hyperlipidemia LDL goal <70     Mild episode of recurrent major depressive disorder (H)     CAD (coronary artery disease)     Allergic rhinitis     Abnormal blood sugar     Acquired hypothyroidism     Myalgia     Spinal stenosis in cervical region     S/P cervical spinal fusion     Cervical vertebral fusion     Aftercare following surgery of the musculoskeletal system     Current Outpatient Medications   Medication Sig Dispense Refill     amLODIPine (NORVASC) 5 MG tablet TAKE 1 TABLET BY MOUTH EVERY DAY 90 tablet 1     aspirin (ASA) 81 MG tablet Take 1 tablet (81 mg) by mouth daily 90 tablet 3     fluticasone (FLONASE) 50 MCG/ACT nasal spray INHALE 1-2 SPRAYS INTO BOTH NOSTRILS DAILY 16 mL 1     levothyroxine (SYNTHROID/LEVOTHROID) 50 MCG tablet TAKE 1 TABLET BY MOUTH EVERY other day alternating with 25 mcg 45 tablet 3     rosuvastatin (CRESTOR) 40 MG tablet TAKE 1 TABLET BY MOUTH EVERY DAY 90 tablet 1     zolpidem (AMBIEN) 5 MG tablet TAKE 1 TABLET (5 MG) BY MOUTH NIGHTLY AS NEEDED FOR SLEEP 30 tablet 5     carvedilol (COREG) 6.25 MG tablet TAKE 1 TABLET (6.25 MG) BY MOUTH 2 TIMES DAILY WITH MEALS 180  "tablet 1     desvenlafaxine (PRISTIQ) 50 MG 24 hr tablet TAKE 1 TABLET BY MOUTH EVERY DAY 90 tablet 1     levothyroxine (SYNTHROID/LEVOTHROID) 25 MCG tablet TAKE 1 TABLET BY MOUTH EVERY OTHER DAY ALTERNATING WITH 50 MCG TABLETS 45 tablet 3     lisinopril (ZESTRIL) 5 MG tablet TAKE 1 TABLET BY MOUTH EVERY DAY 90 tablet 1     nitroGLYcerin (NITROSTAT) 0.4 MG sublingual tablet PLACE 1 TABLET UNDER TONGUE EVERY 5 MINS, UP TO 3 DOSES AS NEEDED FOR CHEST PAIN CALL 911 NO RELIEF 25 tablet 0        Reviewed and updated as needed this visit by Provider                    Review of Systems   Constitutional: Negative for chills and fever.   HENT: Negative for congestion, ear pain, hearing loss and sore throat.    Eyes: Negative for pain and visual disturbance.   Respiratory: Negative for cough and shortness of breath.    Cardiovascular: Negative for chest pain, palpitations and peripheral edema.   Gastrointestinal: Negative for abdominal pain, constipation, diarrhea, heartburn, hematochezia and nausea.   Breasts:  Negative for tenderness, breast mass and discharge.   Genitourinary: Negative for dysuria, frequency, genital sores, hematuria, pelvic pain, urgency, vaginal bleeding and vaginal discharge.   Musculoskeletal: Negative for arthralgias, joint swelling and myalgias.   Skin: Negative for rash.   Neurological: Negative for dizziness, weakness, headaches and paresthesias.   Psychiatric/Behavioral: Negative for mood changes. The patient is not nervous/anxious.           OBJECTIVE:   LMP 07/18/2013   Physical Exam    Patient alert, in no acute distress  BP 96/64   Pulse 98   Temp 98.3  F (36.8  C) (Oral)   Resp 16   Ht 1.6 m (5' 3\")   Wt 61.2 kg (135 lb)   LMP 07/18/2013   SpO2 100%   BMI 23.91 kg/m      HEENT: extraocular movements are intact, pupils equal and reactive to light and accommodation, TMs clear  NECK: Neck supple. No adenopathy. Thyroid symmetric, normal size,, Carotids without bruits.  PULMONARY: clear " to auscultation  CARDIAC: regular rate and rhythm and no murmurs, clicks, or gallops  PULSES: 2/2 throughout  BACK: no spinal or CVAT  ABDOMINAL: Soft, nontender.  Normal bowel sounds.  No hepatosplenomegaly or abnormal masses  BREAST: No breast masses or tenderness, No axillary masses or tenderness and No galactorrhea  REFLEXES: 2+ throughout            ASSESSMENT/PLAN:   1. Encounter for routine adult health examination without abnormal findings  Advised about the shingles vaccine, suggest check with insurance    2. Mild episode of recurrent major depressive disorder (H)  Stable, continue medication    3. Coronary artery disease involving native coronary artery of native heart without angina pectoris  Stable, continue meds, follow cardiology  - Lipid panel reflex to direct LDL Fasting; Future    4. Hyperlipidemia LDL goal <70  Future lab, can do it with cardiology or in several months  - Lipid panel reflex to direct LDL Fasting; Future    5. Abnormal blood sugar  Recheck with next labs  - Basic metabolic panel  (Ca, Cl, CO2, Creat, Gluc, K, Na, BUN); Future  - Hemoglobin A1c; Future    6. Acquired hypothyroidism  Clinically stable, continue medication, recheck with next labs  - levothyroxine (SYNTHROID/LEVOTHROID) 50 MCG tablet; TAKE 1 TABLET BY MOUTH EVERY other day alternating with 25 mcg  Dispense: 45 tablet; Refill: 3  - TSH with free T4 reflex; Future    7. Need for prophylactic vaccination and inoculation against influenza    - Vaccine Administration, Initial [60520]  - Vaccine Administration, Initial [52483]  - INFLUENZA VACCINE IM > 6 MONTHS VALENT IIV4 [34640]  - Vaccine Administration, Each Additional [53320]    8. Encounter for screening mammogram for breast cancer    - *MA Screening Digital Bilateral; Future    Patient has been advised of split billing requirements and indicates understanding: Yes  COUNSELING:  Reviewed preventive health counseling, as reflected in patient instructions    Estimated  "body mass index is 23.34 kg/m  as calculated from the following:    Height as of 9/28/20: 1.626 m (5' 4\").    Weight as of 9/28/20: 61.7 kg (136 lb).        She reports that she quit smoking about 7 months ago. Her smoking use included cigarettes. She has never used smokeless tobacco.      Counseling Resources:  ATP IV Guidelines  Pooled Cohorts Equation Calculator  Breast Cancer Risk Calculator  BRCA-Related Cancer Risk Assessment: FHS-7 Tool  FRAX Risk Assessment  ICSI Preventive Guidelines  Dietary Guidelines for Americans, 2010  USDA's MyPlate  ASA Prophylaxis  Lung CA Screening    Gabriella Carter MD  [facility]  "

## 2020-10-18 DIAGNOSIS — E03.9 ACQUIRED HYPOTHYROIDISM: ICD-10-CM

## 2020-10-18 DIAGNOSIS — I25.10 CORONARY ARTERY DISEASE INVOLVING NATIVE CORONARY ARTERY OF NATIVE HEART WITHOUT ANGINA PECTORIS: ICD-10-CM

## 2020-10-18 DIAGNOSIS — M54.2 CERVICALGIA: ICD-10-CM

## 2020-10-18 DIAGNOSIS — F33.0 MILD EPISODE OF RECURRENT MAJOR DEPRESSIVE DISORDER (H): ICD-10-CM

## 2020-10-21 PROBLEM — Z98.1 S/P CERVICAL SPINAL FUSION: Status: RESOLVED | Noted: 2020-03-24 | Resolved: 2020-10-21

## 2020-10-21 PROBLEM — M48.02 SPINAL STENOSIS IN CERVICAL REGION: Status: RESOLVED | Noted: 2020-02-24 | Resolved: 2020-10-21

## 2020-10-21 RX ORDER — CARVEDILOL 6.25 MG/1
TABLET ORAL
Qty: 180 TABLET | Refills: 1 | Status: SHIPPED | OUTPATIENT
Start: 2020-10-21 | End: 2021-04-21

## 2020-10-21 RX ORDER — DESVENLAFAXINE 50 MG/1
TABLET, FILM COATED, EXTENDED RELEASE ORAL
Qty: 90 TABLET | Refills: 1 | Status: SHIPPED | OUTPATIENT
Start: 2020-10-21 | End: 2021-04-21

## 2020-10-21 RX ORDER — LEVOTHYROXINE SODIUM 25 UG/1
TABLET ORAL
Qty: 45 TABLET | Refills: 3 | Status: SHIPPED | OUTPATIENT
Start: 2020-10-21 | End: 2021-12-10

## 2020-10-21 RX ORDER — LISINOPRIL 5 MG/1
TABLET ORAL
Qty: 90 TABLET | Refills: 1 | Status: SHIPPED | OUTPATIENT
Start: 2020-10-21 | End: 2021-04-21

## 2020-10-21 RX ORDER — LEVOTHYROXINE SODIUM 50 UG/1
TABLET ORAL
Qty: 45 TABLET | Refills: 3 | Status: SHIPPED | OUTPATIENT
Start: 2020-10-21 | End: 2021-09-27

## 2020-10-21 NOTE — TELEPHONE ENCOUNTER
Routing refill request to provider for review/approval because:  Labs not current:  TSH  PHQ-9

## 2020-11-24 PROBLEM — M43.22 CERVICAL VERTEBRAL FUSION: Status: RESOLVED | Noted: 2020-05-18 | Resolved: 2020-11-24

## 2020-11-24 PROBLEM — Z47.89 AFTERCARE FOLLOWING SURGERY OF THE MUSCULOSKELETAL SYSTEM: Status: RESOLVED | Noted: 2020-05-18 | Resolved: 2020-11-24

## 2020-12-09 NOTE — TELEPHONE ENCOUNTER
4/28/2018  My Chart Message Regarding Preventive Health Screening VIP Mammogram     Attempt 1     Message on My Chart  ?  ?  Outreach   [place]  
(0) indicator not present

## 2020-12-14 ENCOUNTER — HEALTH MAINTENANCE LETTER (OUTPATIENT)
Age: 55
End: 2020-12-14

## 2020-12-20 DIAGNOSIS — I25.10 CORONARY ARTERY DISEASE INVOLVING NATIVE CORONARY ARTERY OF NATIVE HEART WITHOUT ANGINA PECTORIS: ICD-10-CM

## 2020-12-20 DIAGNOSIS — G47.00 INSOMNIA, UNSPECIFIED TYPE: ICD-10-CM

## 2020-12-20 DIAGNOSIS — E78.5 HYPERLIPIDEMIA LDL GOAL <70: ICD-10-CM

## 2020-12-22 RX ORDER — ZOLPIDEM TARTRATE 5 MG/1
5 TABLET ORAL
Qty: 30 TABLET | Refills: 5 | Status: SHIPPED | OUTPATIENT
Start: 2020-12-22 | End: 2021-09-28

## 2020-12-22 RX ORDER — ROSUVASTATIN CALCIUM 40 MG/1
TABLET, COATED ORAL
Qty: 90 TABLET | Refills: 1 | Status: SHIPPED | OUTPATIENT
Start: 2020-12-22 | End: 2021-05-11

## 2020-12-22 NOTE — TELEPHONE ENCOUNTER
Pending Prescriptions:                       Disp   Refills    zolpidem (AMBIEN) 5 MG tablet [Pharmacy Me*30 tab*         Sig: TAKE 1 TABLET (5 MG) BY MOUTH NIGHTLY AS NEEDED FOR           SLEEP      Routing refill request to provider for review/approval because:  Drug not on the [facility] refill protocol     Please advise, thanks.

## 2020-12-22 NOTE — TELEPHONE ENCOUNTER
Routing refill request to provider for review/approval because:  Allergy warning  Kenroy Waddell RN, BSN

## 2021-03-22 DIAGNOSIS — R07.89 OTHER CHEST PAIN: ICD-10-CM

## 2021-03-25 RX ORDER — AMLODIPINE BESYLATE 5 MG/1
TABLET ORAL
Qty: 90 TABLET | Refills: 1 | Status: SHIPPED | OUTPATIENT
Start: 2021-03-25 | End: 2021-09-21

## 2021-03-25 NOTE — TELEPHONE ENCOUNTER
Pending Prescriptions:                       Disp   Refills    amLODIPine (NORVASC) 5 MG tablet [Pharmacy*90 tab*1        Sig: TAKE 1 TABLET BY MOUTH EVERY DAY    Routing refill request to provider for review/approval because:  Creatinine   Date Value Ref Range Status   03/09/2020 0.68 0.52 - 1.04 mg/dL Final

## 2021-03-25 NOTE — TELEPHONE ENCOUNTER
Please call and have her schedule her lab appointment, then I will do the refill.  She was supposed to do these last fall.

## 2021-03-29 ENCOUNTER — ANCILLARY PROCEDURE (OUTPATIENT)
Dept: GENERAL RADIOLOGY | Facility: CLINIC | Age: 56
End: 2021-03-29
Attending: NURSE PRACTITIONER
Payer: COMMERCIAL

## 2021-03-29 ENCOUNTER — OFFICE VISIT (OUTPATIENT)
Dept: NEUROSURGERY | Facility: CLINIC | Age: 56
End: 2021-03-29
Attending: NURSE PRACTITIONER
Payer: COMMERCIAL

## 2021-03-29 VITALS
SYSTOLIC BLOOD PRESSURE: 114 MMHG | HEART RATE: 67 BPM | OXYGEN SATURATION: 98 % | DIASTOLIC BLOOD PRESSURE: 73 MMHG | WEIGHT: 136.2 LBS | HEIGHT: 63 IN | BODY MASS INDEX: 24.13 KG/M2

## 2021-03-29 DIAGNOSIS — Z98.1 S/P CERVICAL SPINAL FUSION: Primary | ICD-10-CM

## 2021-03-29 DIAGNOSIS — Z98.1 S/P CERVICAL SPINAL FUSION: ICD-10-CM

## 2021-03-29 PROCEDURE — 72040 X-RAY EXAM NECK SPINE 2-3 VW: CPT | Performed by: RADIOLOGY

## 2021-03-29 PROCEDURE — G0463 HOSPITAL OUTPT CLINIC VISIT: HCPCS

## 2021-03-29 PROCEDURE — G0463 HOSPITAL OUTPT CLINIC VISIT: HCPCS | Mod: 25

## 2021-03-29 PROCEDURE — 99213 OFFICE O/P EST LOW 20 MIN: CPT | Performed by: NURSE PRACTITIONER

## 2021-03-29 ASSESSMENT — PAIN SCALES - GENERAL: PAINLEVEL: NO PAIN (0)

## 2021-03-29 ASSESSMENT — MIFFLIN-ST. JEOR: SCORE: 1176.93

## 2021-03-29 NOTE — PATIENT INSTRUCTIONS
Continue to work with physical therapy     Please call our clinic if symptoms persist, change, or worsen at any time.

## 2021-03-29 NOTE — NURSING NOTE
"March 29, 2021 8:52 AM   Fallon Pizano is a 55 year old female who presents for:    Chief Complaint   Patient presents with     Consult     cervical x-ray follow-up      Initial Vitals: /73   Pulse 67   Ht 5' 3\" (1.6 m)   Wt 136 lb 3.2 oz (61.8 kg)   LMP 07/18/2013   SpO2 98%   BMI 24.13 kg/m   Estimated body mass index is 24.13 kg/m  as calculated from the following:    Height as of this encounter: 5' 3\" (1.6 m).    Weight as of this encounter: 136 lb 3.2 oz (61.8 kg). Body surface area is 1.66 meters squared.  No Pain (0) Comment: Data Unavailable       Clinical concerns: Fallon Pizano is here today for cervical x-ray follow-up.    Agustin Foley MA  "

## 2021-03-29 NOTE — LETTER
"    3/29/2021         RE: Fallon Pizano  [address]  [place] [address]        Dear Colleague,    Thank you for referring your patient, Fallon Pizano, to the [facility] [place]. Please see a copy of my visit note below.    [facility]  Follow Up Visit     HPI: 1 year s/p C4-7 ACDF with Dr. Arriaza. Patient denies any neck pain today. Left arm symptoms resolved. She notes some occasional right arm pain. Reports her migraines have improved compared to pre op. Denies any numbness. Denies any falls or injuries. Currently using NSAIDS for pain management as needed. She did PT this summer.      Current pain: 0/10    Exam:  Constitutional:  Alert, well nourished, NAD.  HEENT: Normocephalic, atraumatic.   Pulm:  Without shortness of breath   CV:  No pitting edema of BLE.     Vital Signs: /73   Pulse 67   Ht 5' 3\" (1.6 m)   Wt 136 lb 3.2 oz (61.8 kg)   LMP 07/18/2013   SpO2 98%   BMI 24.13 kg/m      Neurological:  Awake  Alert  Oriented x 3  Motor exam:  Shoulder Abduction:  Right:  5/5    Left:  5/5  Biceps:                      Right:  5/5    Left:  5/5  Triceps:                     Right:  5/5    Left:  5/5  Wrist Extensors:       Right:  5/5    Left:  5/5  Wrist Flexors:           Right:  5/5    Left:  5/5  Intrinsics:                  Right:  5/5    Left:  5/5    Able to spontaneously move U/E bilaterally  Sensation intact throughout all U/E dermatomes    Incision: well healed     Imaging: AP and lateral cervical spine films reveal stable and intact hardware    Assessment/Plan:   1 year s/p C4-7 ACDF with Dr. Arriaza. Patient denies any neck pain today. Left arm symptoms resolved. She notes some occasional right arm pain. Reports her migraines have improved compared to pre op. XR with stable and intact hardware. We discussed resuming PT at this time. Patient can follow-up as needed or if her symptoms persist or " worsen.    Discussed red flag symptoms and advised to seek medical attention if these develop. Patient voiced understanding and agreement.        Radha Marin CNP  [facility]  [address] [address]  Tel [phone]  Pager [phone]          Again, thank you for allowing me to participate in the care of your patient.        Sincerely,        Radha Marin, NP

## 2021-03-29 NOTE — PROGRESS NOTES
"[facility]  Follow Up Visit     HPI: 1 year s/p C4-7 ACDF with Dr. Arriaza. Patient denies any neck pain today. Left arm symptoms resolved. She notes some occasional right arm pain. Reports her migraines have improved compared to pre op. Denies any numbness. Denies any falls or injuries. Currently using NSAIDS for pain management as needed. She did PT this summer.      Current pain: 0/10    Exam:  Constitutional:  Alert, well nourished, NAD.  HEENT: Normocephalic, atraumatic.   Pulm:  Without shortness of breath   CV:  No pitting edema of BLE.     Vital Signs: /73   Pulse 67   Ht 5' 3\" (1.6 m)   Wt 136 lb 3.2 oz (61.8 kg)   LMP 07/18/2013   SpO2 98%   BMI 24.13 kg/m      Neurological:  Awake  Alert  Oriented x 3  Motor exam:  Shoulder Abduction:  Right:  5/5    Left:  5/5  Biceps:                      Right:  5/5    Left:  5/5  Triceps:                     Right:  5/5    Left:  5/5  Wrist Extensors:       Right:  5/5    Left:  5/5  Wrist Flexors:           Right:  5/5    Left:  5/5  Intrinsics:                  Right:  5/5    Left:  5/5    Able to spontaneously move U/E bilaterally  Sensation intact throughout all U/E dermatomes    Incision: well healed     Imaging: AP and lateral cervical spine films reveal stable and intact hardware    Assessment/Plan:   1 year s/p C4-7 ACDF with Dr. Arriaza. Patient denies any neck pain today. Left arm symptoms resolved. She notes some occasional right arm pain. Reports her migraines have improved compared to pre op. XR with stable and intact hardware. We discussed resuming PT at this time. Patient can follow-up as needed or if her symptoms persist or worsen.    Discussed red flag symptoms and advised to seek medical attention if these develop. Patient voiced understanding and agreement.        Radha Marin CNP  [facility]surgery  [address] [place] [id number]  Tel " [phone]  Pager [phone]

## 2021-04-08 DIAGNOSIS — E03.9 ACQUIRED HYPOTHYROIDISM: ICD-10-CM

## 2021-04-08 DIAGNOSIS — E78.5 HYPERLIPIDEMIA LDL GOAL <70: ICD-10-CM

## 2021-04-08 DIAGNOSIS — I25.10 CORONARY ARTERY DISEASE INVOLVING NATIVE CORONARY ARTERY OF NATIVE HEART WITHOUT ANGINA PECTORIS: ICD-10-CM

## 2021-04-08 DIAGNOSIS — R73.09 ABNORMAL BLOOD SUGAR: ICD-10-CM

## 2021-04-08 LAB
ANION GAP SERPL CALCULATED.3IONS-SCNC: <1 MMOL/L (ref 3–14)
BUN SERPL-MCNC: 11 MG/DL (ref 7–30)
CALCIUM SERPL-MCNC: 9.1 MG/DL (ref 8.5–10.1)
CHLORIDE SERPL-SCNC: 111 MMOL/L (ref 94–109)
CHOLEST SERPL-MCNC: 150 MG/DL
CO2 SERPL-SCNC: 29 MMOL/L (ref 20–32)
CREAT SERPL-MCNC: 0.78 MG/DL (ref 0.52–1.04)
GFR SERPL CREATININE-BSD FRML MDRD: 85 ML/MIN/{1.73_M2}
GLUCOSE SERPL-MCNC: 111 MG/DL (ref 70–99)
HBA1C MFR BLD: 5.8 % (ref 0–5.6)
HDLC SERPL-MCNC: 50 MG/DL
LDLC SERPL CALC-MCNC: 71 MG/DL
NONHDLC SERPL-MCNC: 100 MG/DL
POTASSIUM SERPL-SCNC: 4.5 MMOL/L (ref 3.4–5.3)
SODIUM SERPL-SCNC: 140 MMOL/L (ref 133–144)
TRIGL SERPL-MCNC: 143 MG/DL
TSH SERPL DL<=0.005 MIU/L-ACNC: 1.37 MU/L (ref 0.4–4)

## 2021-04-08 PROCEDURE — 80061 LIPID PANEL: CPT | Performed by: INTERNAL MEDICINE

## 2021-04-08 PROCEDURE — 80048 BASIC METABOLIC PNL TOTAL CA: CPT | Performed by: INTERNAL MEDICINE

## 2021-04-08 PROCEDURE — 84443 ASSAY THYROID STIM HORMONE: CPT | Performed by: INTERNAL MEDICINE

## 2021-04-08 PROCEDURE — 36415 COLL VENOUS BLD VENIPUNCTURE: CPT | Performed by: INTERNAL MEDICINE

## 2021-04-08 PROCEDURE — 83036 HEMOGLOBIN GLYCOSYLATED A1C: CPT | Performed by: INTERNAL MEDICINE

## 2021-04-08 NOTE — TELEPHONE ENCOUNTER
"      Requested Prescriptions   Pending Prescriptions Disp Refills     carvedilol (COREG) 6.25 MG tablet [Pharmacy Med Name: CARVEDILOL 6.25 MG TABLET] 180 tablet 2     Sig: TAKE 1 TABLET (6.25 MG) BY MOUTH 2 TIMES DAILY (WITH MEALS)    Beta-Blockers Protocol Passed    8/4/2018  2:19 AM       Passed - Blood pressure under 140/90 in past 12 months    BP Readings from Last 3 Encounters:   04/09/18 108/71   11/22/17 96/57   10/27/17 100/64                Passed - Patient is age 6 or older       Passed - Recent (12 mo) or future (30 days) visit within the authorizing provider's specialty    Patient had office visit in the last 12 months or has a visit in the next 30 days with authorizing provider or within the authorizing provider's specialty.  See \"Patient Info\" tab in inbasket, or \"Choose Columns\" in Meds & Orders section of the refill encounter.              " Dr. Cortez ordering breast biopsy to be done. Once this is back, we will sent cytology and pathology to  for review and see her after.     I called to let patient know scheduling breast biopsy we will call her back with dates once scheduled.     Patient has not seen medical oncologist-Dr. Medley with [facility] for approximately 15 yrs.

## 2021-04-19 DIAGNOSIS — I25.10 CORONARY ARTERY DISEASE INVOLVING NATIVE CORONARY ARTERY OF NATIVE HEART WITHOUT ANGINA PECTORIS: ICD-10-CM

## 2021-04-19 DIAGNOSIS — M54.2 CERVICALGIA: ICD-10-CM

## 2021-04-19 DIAGNOSIS — F33.0 MILD EPISODE OF RECURRENT MAJOR DEPRESSIVE DISORDER (H): ICD-10-CM

## 2021-04-21 RX ORDER — LISINOPRIL 5 MG/1
TABLET ORAL
Qty: 90 TABLET | Refills: 0 | Status: SHIPPED | OUTPATIENT
Start: 2021-04-21 | End: 2021-05-11

## 2021-04-21 RX ORDER — DESVENLAFAXINE 50 MG/1
TABLET, FILM COATED, EXTENDED RELEASE ORAL
Qty: 90 TABLET | Refills: 0 | Status: SHIPPED | OUTPATIENT
Start: 2021-04-21 | End: 2021-05-11

## 2021-04-21 RX ORDER — CARVEDILOL 6.25 MG/1
TABLET ORAL
Qty: 180 TABLET | Refills: 0 | Status: SHIPPED | OUTPATIENT
Start: 2021-04-21 | End: 2021-05-11

## 2021-04-21 NOTE — TELEPHONE ENCOUNTER
Medication is being filled for 1 time refill only due to:  Patient needs to be seen because due for 6 month follow up appointment.     Please contact patient and advise her to schedule an appointment with Dr. Carter.

## 2021-05-11 ENCOUNTER — OFFICE VISIT (OUTPATIENT)
Dept: INTERNAL MEDICINE | Facility: CLINIC | Age: 56
End: 2021-05-11
Payer: COMMERCIAL

## 2021-05-11 VITALS
HEART RATE: 91 BPM | HEIGHT: 64 IN | WEIGHT: 137 LBS | BODY MASS INDEX: 23.39 KG/M2 | OXYGEN SATURATION: 97 % | TEMPERATURE: 98.9 F | SYSTOLIC BLOOD PRESSURE: 99 MMHG | DIASTOLIC BLOOD PRESSURE: 73 MMHG | RESPIRATION RATE: 20 BRPM

## 2021-05-11 DIAGNOSIS — J30.9 ALLERGIC RHINITIS, UNSPECIFIED SEASONALITY, UNSPECIFIED TRIGGER: ICD-10-CM

## 2021-05-11 DIAGNOSIS — E78.5 HYPERLIPIDEMIA LDL GOAL <70: ICD-10-CM

## 2021-05-11 DIAGNOSIS — F33.0 MILD EPISODE OF RECURRENT MAJOR DEPRESSIVE DISORDER (H): Primary | ICD-10-CM

## 2021-05-11 DIAGNOSIS — H61.23 BILATERAL IMPACTED CERUMEN: ICD-10-CM

## 2021-05-11 DIAGNOSIS — I25.10 CORONARY ARTERY DISEASE INVOLVING NATIVE CORONARY ARTERY OF NATIVE HEART WITHOUT ANGINA PECTORIS: ICD-10-CM

## 2021-05-11 PROCEDURE — 99213 OFFICE O/P EST LOW 20 MIN: CPT | Mod: 25 | Performed by: INTERNAL MEDICINE

## 2021-05-11 PROCEDURE — 96127 BRIEF EMOTIONAL/BEHAV ASSMT: CPT | Performed by: INTERNAL MEDICINE

## 2021-05-11 PROCEDURE — 69210 REMOVE IMPACTED EAR WAX UNI: CPT | Mod: 50 | Performed by: INTERNAL MEDICINE

## 2021-05-11 RX ORDER — CARVEDILOL 6.25 MG/1
6.25 TABLET ORAL 2 TIMES DAILY WITH MEALS
Qty: 180 TABLET | Refills: 1 | Status: SHIPPED | OUTPATIENT
Start: 2021-05-11 | End: 2022-01-20

## 2021-05-11 RX ORDER — LISINOPRIL 5 MG/1
5 TABLET ORAL DAILY
Qty: 90 TABLET | Refills: 1 | Status: SHIPPED | OUTPATIENT
Start: 2021-05-11 | End: 2022-01-20

## 2021-05-11 RX ORDER — ROSUVASTATIN CALCIUM 40 MG/1
40 TABLET, COATED ORAL DAILY
Qty: 90 TABLET | Refills: 1 | Status: SHIPPED | OUTPATIENT
Start: 2021-05-11 | End: 2021-12-10

## 2021-05-11 RX ORDER — DESVENLAFAXINE 50 MG/1
50 TABLET, FILM COATED, EXTENDED RELEASE ORAL DAILY
Qty: 90 TABLET | Refills: 1 | Status: SHIPPED | OUTPATIENT
Start: 2021-05-11 | End: 2022-01-20

## 2021-05-11 RX ORDER — FLUTICASONE PROPIONATE 50 MCG
2 SPRAY, SUSPENSION (ML) NASAL DAILY
Qty: 16 ML | Refills: 11 | Status: SHIPPED | OUTPATIENT
Start: 2021-05-11 | End: 2024-07-12

## 2021-05-11 ASSESSMENT — MIFFLIN-ST. JEOR: SCORE: 1196.43

## 2021-05-11 ASSESSMENT — PATIENT HEALTH QUESTIONNAIRE - PHQ9: SUM OF ALL RESPONSES TO PHQ QUESTIONS 1-9: 8

## 2021-05-11 NOTE — PROGRESS NOTES
{PROVIDER CHARTING PREFERENCE:032840}    Ana Lehman is a 55 year old who presents for the following health issues {ACCOMPANIED BY STATEMENT (Optional):087630}    HPI     Hyperlipidemia Follow-Up      Are you regularly taking any medication or supplement to lower your cholesterol?   Yes- rosuvastatin    Are you having muscle aches or other side effects that you think could be caused by your cholesterol lowering medication?  No    Hypertension Follow-up      Do you check your blood pressure regularly outside of the clinic? Yes     Are you following a low salt diet? No    Are your blood pressures ever more than 140 on the top number (systolic) OR more   than 90 on the bottom number (diastolic), for example 140/90? No    Depression Followup    How are you doing with your depression since your last visit? No change    Are you having other symptoms that might be associated with depression? No    Have you had a significant life event?  Grief or Loss     Are you feeling anxious or having panic attacks?   No    Do you have any concerns with your use of alcohol or other drugs? No    Social History     Tobacco Use     Smoking status: Former Smoker     Types: Cigarettes     Quit date: 3/10/2020     Years since quittin.1     Smokeless tobacco: Never Used     Tobacco comment: smokes occ   Substance Use Topics     Alcohol use: Yes     Alcohol/week: 0.0 standard drinks     Comment: Casual     Drug use: No     PHQ 10/12/2017 2018 2019   PHQ-9 Total Score 9 9 12   Q9: Thoughts of better off dead/self-harm past 2 weeks Not at all Not at all Not at all     ALBARO-7 SCORE 2015   Total Score 6     {Last PHQ9 or GAD7 Responses (Optional):823345}    Suicide Assessment Five-step Evaluation and Treatment (SAFE-T)  {Provider  Link to Depression Care Package SmartSet :671213}  Hypothyroidism Follow-up      Since last visit, patient describes the following symptoms: Weight stable, no hair loss, no skin changes, no  constipation, no loose stools      How many servings of fruits and vegetables do you eat daily?  2-3    On average, how many sweetened beverages do you drink each day (Examples: soda, juice, sweet tea, etc.  Do NOT count diet or artificially sweetened beverages)?   1    How many days per week do you exercise enough to make your heart beat faster? 3 or less    How many minutes a day do you exercise enough to make your heart beat faster? 9 or less    How many days per week do you miss taking your medication? 0    {additonal problems for provider to add (Optional):394629}    Review of Systems   {ROS COMP (Optional):435973}      Objective    LMP 07/18/2013   There is no height or weight on file to calculate BMI.  Physical Exam   {Exam List (Optional):006167}    {Diagnostic Test Results (Optional):440339}    {AMBULATORY ATTESTATION (Optional):612348}

## 2021-05-11 NOTE — PROGRESS NOTES
Assessment & Plan     Mild episode of recurrent major depressive disorder (H)  Improved symptoms, continue medication  - desvenlafaxine (PRISTIQ) 50 MG 24 hr tablet; Take 1 tablet (50 mg) by mouth daily  - EMOTIONAL / BEHAVIORAL ASSESSMENT    Coronary artery disease involving native coronary artery of native heart without angina pectoris  Doing well without chest pain, continue medication  Reassured her kidney function is normal, previous liver function is normal, previous CT showed no evidence of liver changes, negative hepatitis C so not much risk of cirrhosis, fatty liver  - lisinopril (ZESTRIL) 5 MG tablet; Take 1 tablet (5 mg) by mouth daily  - carvedilol (COREG) 6.25 MG tablet; Take 1 tablet (6.25 mg) by mouth 2 times daily (with meals)  - rosuvastatin (CRESTOR) 40 MG tablet; Take 1 tablet (40 mg) by mouth daily      Hyperlipidemia LDL goal <70  Stable, continue medication  - rosuvastatin (CRESTOR) 40 MG tablet; Take 1 tablet (40 mg) by mouth daily    Allergic rhinitis, unspecified seasonality, unspecified trigger  She may have some eustachian tube dysfunction so recommend restart this    Bilateral impacted cerumen  We were able to flush some of the earwax but there still is residual in the right ear, recommend over-the-counter get  - REMOVE IMPACTED CERUMEN        Return in about 6 months (around 11/11/2021) for Physical Exam.    Gabriella Carter MD  [facility]    Ana Lehman is a 55 year old who presents for the following health issues     HPI     Hyperlipidemia Follow-Up      Are you regularly taking any medication or supplement to lower your cholesterol?   Yes- rosuvastatin    Are you having muscle aches or other side effects that you think could be caused by your cholesterol lowering medication?  No    Depression Followup    How are you doing with your depression since your last visit? Improved     Are you having other symptoms that might be associated with depression?  No    Have you had a significant life event?  Grief or Loss     Are you feeling anxious or having panic attacks?   No    Do you have any concerns with your use of alcohol or other drugs? No      PHQ 2018   PHQ-9 Total Score 9 12 8   Q9: Thoughts of better off dead/self-harm past 2 weeks Not at all Not at all Not at all         Suicide Assessment Five-step Evaluation and Treatment (SAFE-T)    Hypothyroidism Follow-up      Since last visit, patient describes the following symptoms: Weight stable, no hair loss, no skin changes, no constipation, no loose stools      How many servings of fruits and vegetables do you eat daily?  2-3    On average, how many sweetened beverages do you drink each day (Examples: soda, juice, sweet tea, etc.  Do NOT count diet or artificially sweetened beverages)?   1    How many days per week do you exercise enough to make your heart beat faster? 3 or less    How many minutes a day do you exercise enough to make your heart beat faster? 9 or less    How many days per week do you miss taking your medication? 0    Other problems:  Coronary artery disease: She is doing well without chest pains, dyspnea    Current concerns:  1.  Her right ear is plugged, feels a little off balance at times.  It is not painful.  There is a little pressure.  It is very annoying  2.  Her mother  of cirrhosis of the liver several months ago.  She also did have kidney failure at the time.  It was not related to alcohol, she was concerned about whether there might be any risk of her developing problems.    Patient Active Problem List   Diagnosis     Hyperlipidemia LDL goal <70     Mild episode of recurrent major depressive disorder (H)     CAD (coronary artery disease)     Allergic rhinitis     Abnormal blood sugar     Acquired hypothyroidism     Myalgia     Current Outpatient Medications   Medication Sig Dispense Refill     amLODIPine (NORVASC) 5 MG tablet TAKE 1 TABLET BY MOUTH EVERY DAY  "90 tablet 1     aspirin (ASA) 81 MG tablet Take 1 tablet (81 mg) by mouth daily 90 tablet 3     carvedilol (COREG) 6.25 MG tablet Take 1 tablet (6.25 mg) by mouth 2 times daily (with meals) 180 tablet 1     desvenlafaxine (PRISTIQ) 50 MG 24 hr tablet Take 1 tablet (50 mg) by mouth daily 90 tablet 1     fluticasone (FLONASE) 50 MCG/ACT nasal spray Spray 2 sprays into both nostrils daily 16 mL 11     levothyroxine (SYNTHROID/LEVOTHROID) 25 MCG tablet TAKE 1 TABLET BY MOUTH EVERY OTHER DAY ALTERNATING WITH 50 MCG TABLETS 45 tablet 3     levothyroxine (SYNTHROID/LEVOTHROID) 50 MCG tablet TAKE 1 TABLET BY MOUTH EVERY other day alternating with 25 mcg 45 tablet 3     lisinopril (ZESTRIL) 5 MG tablet Take 1 tablet (5 mg) by mouth daily 90 tablet 1     nitroGLYcerin (NITROSTAT) 0.4 MG sublingual tablet PLACE 1 TABLET UNDER TONGUE EVERY 5 MINS, UP TO 3 DOSES AS NEEDED FOR CHEST PAIN CALL 911 NO RELIEF 25 tablet 0     rosuvastatin (CRESTOR) 40 MG tablet Take 1 tablet (40 mg) by mouth daily 90 tablet 1     zolpidem (AMBIEN) 5 MG tablet TAKE 1 TABLET (5 MG) BY MOUTH NIGHTLY AS NEEDED FOR SLEEP 30 tablet 5        Review of Systems   No fever, chills, no dyspnea on exertion, no chest pain, palpitations, PND, orthopnea, edema, syncope, headache, abdominal pain       Objective    BP 99/73 (BP Location: Right arm, Patient Position: Sitting, Cuff Size: Adult Regular)   Pulse 91   Temp 98.9  F (37.2  C) (Oral)   Resp 20   Ht 1.626 m (5' 4\")   Wt 62.1 kg (137 lb)   LMP 07/18/2013   SpO2 97%   BMI 23.52 kg/m    Body mass index is 23.52 kg/m .  Physical Exam     Right ear: Moderate amount of wax deep in your eardrum, only very small amount was able to be removed manually with a curette, the medical assistant flush the ear with moderate results, there is still some residual wax  Left ear: Moderate amount of wax, only a small amount was removed manually with a curette, rest was flushed by the medical assistant with good " results    PHQ 9/18/2018 9/26/2019 5/11/2021   PHQ-9 Total Score 9 12 8   Q9: Thoughts of better off dead/self-harm past 2 weeks Not at all Not at all Not at all

## 2021-06-23 ENCOUNTER — MYC MEDICAL ADVICE (OUTPATIENT)
Dept: NEUROSURGERY | Facility: CLINIC | Age: 56
End: 2021-06-23

## 2021-06-23 DIAGNOSIS — Z98.1 S/P CERVICAL SPINAL FUSION: Primary | ICD-10-CM

## 2021-06-25 NOTE — TELEPHONE ENCOUNTER
Attempted to reach out to patient, no answer. Left voice message for patient to call clinic back to further discuss.     Placed the orders for the cervical xray.  She will need the number to schedule and follow up with an AJ.  If her symptoms are severe she should follow up with urgent care

## 2021-09-26 DIAGNOSIS — E03.9 ACQUIRED HYPOTHYROIDISM: ICD-10-CM

## 2021-09-26 DIAGNOSIS — G47.00 INSOMNIA, UNSPECIFIED TYPE: ICD-10-CM

## 2021-09-27 RX ORDER — LEVOTHYROXINE SODIUM 50 UG/1
TABLET ORAL
Qty: 45 TABLET | Refills: 1 | Status: SHIPPED | OUTPATIENT
Start: 2021-09-27 | End: 2022-02-25

## 2021-09-28 RX ORDER — ZOLPIDEM TARTRATE 5 MG/1
5 TABLET ORAL
Qty: 30 TABLET | Refills: 5 | Status: SHIPPED | OUTPATIENT
Start: 2021-09-28 | End: 2022-03-22

## 2021-09-28 NOTE — TELEPHONE ENCOUNTER
Routing refill request to provider for review/approval because:  Drug not on the [facility] refill protocol   Kenroy Waddell RN, BSN

## 2021-10-02 ENCOUNTER — HEALTH MAINTENANCE LETTER (OUTPATIENT)
Age: 56
End: 2021-10-02

## 2021-11-27 ENCOUNTER — HEALTH MAINTENANCE LETTER (OUTPATIENT)
Age: 56
End: 2021-11-27

## 2022-01-11 ENCOUNTER — OFFICE VISIT (OUTPATIENT)
Dept: CARDIOLOGY | Facility: CLINIC | Age: 57
End: 2022-01-11
Payer: COMMERCIAL

## 2022-01-11 VITALS
HEIGHT: 64 IN | OXYGEN SATURATION: 97 % | DIASTOLIC BLOOD PRESSURE: 74 MMHG | BODY MASS INDEX: 24.09 KG/M2 | HEART RATE: 86 BPM | WEIGHT: 141.1 LBS | SYSTOLIC BLOOD PRESSURE: 110 MMHG

## 2022-01-11 DIAGNOSIS — E78.2 MIXED HYPERLIPIDEMIA: ICD-10-CM

## 2022-01-11 DIAGNOSIS — I25.10 CORONARY ARTERY DISEASE INVOLVING NATIVE CORONARY ARTERY OF NATIVE HEART WITHOUT ANGINA PECTORIS: ICD-10-CM

## 2022-01-11 PROCEDURE — 99213 OFFICE O/P EST LOW 20 MIN: CPT | Performed by: INTERNAL MEDICINE

## 2022-01-11 RX ORDER — UBIDECARENONE 200 MG
CAPSULE ORAL
COMMUNITY

## 2022-01-11 ASSESSMENT — MIFFLIN-ST. JEOR: SCORE: 1210.03

## 2022-01-11 NOTE — PROGRESS NOTES
"Cardiology Progress Note          Assessment and Plan:       1. Coronary artery disease status post PCI of the LAD 2011, no recurrent symptoms    Good medical management.  Continue current medications.    Routine follow-up in 1 year.    20 minutes was spent with the patient, precharting and reviewing tests as well as post visit charting all done today..    This note was transcribed using electronic voice recognition software and there may be typographical errors present.                    Interval History:     The patient is a very pleasant 56 year old whom I have been following for coronary artery disease status post PCI to the proximal LAD 2011, no exertional symptoms currently.  She has not been very active but does not endorse any dyspnea on exertion or exertional chest discomfort.                     Review of Systems:     Review of Systems:  Skin:  Negative     Eyes:  Positive for contacts;glasses  ENT:  Positive for postnasal drainage;sinus trouble  Respiratory:  Negative    Cardiovascular:  Negative    Gastroenterology: Negative    Genitourinary:  Negative    Musculoskeletal:  Positive for joint pain;neck pain  Neurologic:  Negative headaches  Psychiatric:  Negative sleep disturbances;depression;anxiety  Heme/Lymph/Imm:  Negative allergies  Endocrine:  Positive for thyroid disorder              Physical Exam:     Vitals: /74 (BP Location: Right arm, Patient Position: Sitting, Cuff Size: Adult Regular)   Pulse 86   Ht 1.626 m (5' 4\")   Wt 64 kg (141 lb 1.6 oz)   LMP 07/18/2013   SpO2 97%   BMI 24.22 kg/m    Constitutional:  cooperative, alert and oriented, well developed, well nourished, in no acute distress        Skin:  warm and dry to the touch;no apparent skin lesions or masses noted        Head:  normocephalic, no masses or lesions        Eyes:       Equal and reactive    Chest:  normal symmetry        Cardiac: regular rhythm;normal S1 and S2;no murmurs, gallops or rubs detected              "     Extremities and Back:  no edema;no deformities, clubbing, cyanosis, erythema observed        Neurological:  affect appropriate;no gross motor deficits                 Medications:     Current Outpatient Medications   Medication Sig Dispense Refill     amLODIPine (NORVASC) 5 MG tablet TAKE 1 TABLET BY MOUTH EVERY DAY 90 tablet 1     aspirin (ASA) 81 MG tablet Take 1 tablet (81 mg) by mouth daily 90 tablet 3     carvedilol (COREG) 6.25 MG tablet Take 1 tablet (6.25 mg) by mouth 2 times daily (with meals) 180 tablet 1     coenzyme Q-10 200 MG CAPS        desvenlafaxine (PRISTIQ) 50 MG 24 hr tablet Take 1 tablet (50 mg) by mouth daily 90 tablet 1     levothyroxine (SYNTHROID/LEVOTHROID) 25 MCG tablet TAKE 1 TABLET BY MOUTH EVERY OTHER DAY ALTERNATING WITH 50 MCG TABLETS 45 tablet 0     levothyroxine (SYNTHROID/LEVOTHROID) 50 MCG tablet TAKE 1 TABLET BY MOUTH EVERY OTHER DAY ALTERNATING WITH 25 MCG 45 tablet 1     lisinopril (ZESTRIL) 5 MG tablet Take 1 tablet (5 mg) by mouth daily 90 tablet 1     rosuvastatin (CRESTOR) 40 MG tablet TAKE 1 TABLET BY MOUTH EVERY DAY 90 tablet 0     zolpidem (AMBIEN) 5 MG tablet TAKE 1 TABLET (5 MG) BY MOUTH NIGHTLY AS NEEDED FOR SLEEP 30 tablet 5     fluticasone (FLONASE) 50 MCG/ACT nasal spray Spray 2 sprays into both nostrils daily (Patient not taking: Reported on 1/11/2022) 16 mL 11     nitroGLYcerin (NITROSTAT) 0.4 MG sublingual tablet PLACE 1 TABLET UNDER TONGUE EVERY 5 MINS, UP TO 3 DOSES AS NEEDED FOR CHEST PAIN CALL 911 NO RELIEF (Patient not taking: Reported on 1/11/2022) 25 tablet 0                Data:   All laboratory data reviewed  No results found for this or any previous visit (from the past 24 hour(s)).    All laboratory data reviewed  Lab Results   Component Value Date    CHOL 150 04/08/2021     Lab Results   Component Value Date    HDL 50 04/08/2021     Lab Results   Component Value Date    LDL 71 04/08/2021     Lab Results   Component Value Date    TRIG 143  04/08/2021     Lab Results   Component Value Date    CHOLHDLRATIO 3.0 06/24/2015     TSH   Date Value Ref Range Status   04/08/2021 1.37 0.40 - 4.00 mU/L Final     Last Basic Metabolic Panel:  Lab Results   Component Value Date     04/08/2021      Lab Results   Component Value Date    POTASSIUM 4.5 04/08/2021     Lab Results   Component Value Date    CHLORIDE 111 04/08/2021     Lab Results   Component Value Date    SHANTELL 9.1 04/08/2021     Lab Results   Component Value Date    CO2 29 04/08/2021     Lab Results   Component Value Date    BUN 11 04/08/2021     Lab Results   Component Value Date    CR 0.78 04/08/2021     Lab Results   Component Value Date     04/08/2021     Lab Results   Component Value Date    WBC 4.9 03/09/2020     Lab Results   Component Value Date    RBC 5.04 03/09/2020     Lab Results   Component Value Date    HGB 15.1 03/09/2020     Lab Results   Component Value Date    HCT 47.0 03/09/2020     Lab Results   Component Value Date    MCV 93 03/09/2020     Lab Results   Component Value Date    MCH 30.0 03/09/2020     Lab Results   Component Value Date    MCHC 32.1 03/09/2020     Lab Results   Component Value Date    RDW 13.2 03/09/2020     Lab Results   Component Value Date     03/09/2020

## 2022-01-11 NOTE — LETTER
"1/11/2022    Gabriella Carter MD  [address] [facility] [address]  [facility] [address]    RE: Fallon Pizano       Dear Colleague,     I had the pleasure of seeing Fallon Pizano in the [facility].  Cardiology Progress Note          Assessment and Plan:       1. Coronary artery disease status post PCI of the LAD 2011, no recurrent symptoms    Good medical management.  Continue current medications.    Routine follow-up in 1 year.    20 minutes was spent with the patient, precharting and reviewing tests as well as post visit charting all done today..    This note was transcribed using electronic voice recognition software and there may be typographical errors present.                    Interval History:     The patient is a very pleasant 56 year old whom I have been following for coronary artery disease status post PCI to the proximal LAD 2011, no exertional symptoms currently.  She has not been very active but does not endorse any dyspnea on exertion or exertional chest discomfort.                     Review of Systems:     Review of Systems:  Skin:  Negative     Eyes:  Positive for contacts;glasses  ENT:  Positive for postnasal drainage;sinus trouble  Respiratory:  Negative    Cardiovascular:  Negative    Gastroenterology: Negative    Genitourinary:  Negative    Musculoskeletal:  Positive for joint pain;neck pain  Neurologic:  Negative headaches  Psychiatric:  Negative sleep disturbances;depression;anxiety  Heme/Lymph/Imm:  Negative allergies  Endocrine:  Positive for thyroid disorder              Physical Exam:     Vitals: /74 (BP Location: Right arm, Patient Position: Sitting, Cuff Size: Adult Regular)   Pulse 86   Ht 1.626 m (5' 4\")   Wt 64 kg (141 lb 1.6 oz)   LMP 07/18/2013   SpO2 97%   BMI 24.22 kg/m    Constitutional:  cooperative, alert and oriented, well developed, well nourished, in no acute distress        Skin:  warm and dry to the touch;no apparent " skin lesions or masses noted        Head:  normocephalic, no masses or lesions        Eyes:       Equal and reactive    Chest:  normal symmetry        Cardiac: regular rhythm;normal S1 and S2;no murmurs, gallops or rubs detected                  Extremities and Back:  no edema;no deformities, clubbing, cyanosis, erythema observed        Neurological:  affect appropriate;no gross motor deficits                 Medications:     Current Outpatient Medications   Medication Sig Dispense Refill     amLODIPine (NORVASC) 5 MG tablet TAKE 1 TABLET BY MOUTH EVERY DAY 90 tablet 1     aspirin (ASA) 81 MG tablet Take 1 tablet (81 mg) by mouth daily 90 tablet 3     carvedilol (COREG) 6.25 MG tablet Take 1 tablet (6.25 mg) by mouth 2 times daily (with meals) 180 tablet 1     coenzyme Q-10 200 MG CAPS        desvenlafaxine (PRISTIQ) 50 MG 24 hr tablet Take 1 tablet (50 mg) by mouth daily 90 tablet 1     levothyroxine (SYNTHROID/LEVOTHROID) 25 MCG tablet TAKE 1 TABLET BY MOUTH EVERY OTHER DAY ALTERNATING WITH 50 MCG TABLETS 45 tablet 0     levothyroxine (SYNTHROID/LEVOTHROID) 50 MCG tablet TAKE 1 TABLET BY MOUTH EVERY OTHER DAY ALTERNATING WITH 25 MCG 45 tablet 1     lisinopril (ZESTRIL) 5 MG tablet Take 1 tablet (5 mg) by mouth daily 90 tablet 1     rosuvastatin (CRESTOR) 40 MG tablet TAKE 1 TABLET BY MOUTH EVERY DAY 90 tablet 0     zolpidem (AMBIEN) 5 MG tablet TAKE 1 TABLET (5 MG) BY MOUTH NIGHTLY AS NEEDED FOR SLEEP 30 tablet 5     fluticasone (FLONASE) 50 MCG/ACT nasal spray Spray 2 sprays into both nostrils daily (Patient not taking: Reported on 1/11/2022) 16 mL 11     nitroGLYcerin (NITROSTAT) 0.4 MG sublingual tablet PLACE 1 TABLET UNDER TONGUE EVERY 5 MINS, UP TO 3 DOSES AS NEEDED FOR CHEST PAIN CALL 911 NO RELIEF (Patient not taking: Reported on 1/11/2022) 25 tablet 0                Data:   All laboratory data reviewed  No results found for this or any previous visit (from the past 24 hour(s)).    All laboratory data  reviewed  Lab Results   Component Value Date    CHOL 150 04/08/2021     Lab Results   Component Value Date    HDL 50 04/08/2021     Lab Results   Component Value Date    LDL 71 04/08/2021     Lab Results   Component Value Date    TRIG 143 04/08/2021     Lab Results   Component Value Date    CHOLHDLRATIO 3.0 06/24/2015     TSH   Date Value Ref Range Status   04/08/2021 1.37 0.40 - 4.00 mU/L Final     Last Basic Metabolic Panel:  Lab Results   Component Value Date     04/08/2021      Lab Results   Component Value Date    POTASSIUM 4.5 04/08/2021     Lab Results   Component Value Date    CHLORIDE 111 04/08/2021     Lab Results   Component Value Date    SHANTELL 9.1 04/08/2021     Lab Results   Component Value Date    CO2 29 04/08/2021     Lab Results   Component Value Date    BUN 11 04/08/2021     Lab Results   Component Value Date    CR 0.78 04/08/2021     Lab Results   Component Value Date     04/08/2021     Lab Results   Component Value Date    WBC 4.9 03/09/2020     Lab Results   Component Value Date    RBC 5.04 03/09/2020     Lab Results   Component Value Date    HGB 15.1 03/09/2020     Lab Results   Component Value Date    HCT 47.0 03/09/2020     Lab Results   Component Value Date    MCV 93 03/09/2020     Lab Results   Component Value Date    MCH 30.0 03/09/2020     Lab Results   Component Value Date    MCHC 32.1 03/09/2020     Lab Results   Component Value Date    RDW 13.2 03/09/2020     Lab Results   Component Value Date     03/09/2020     Thank you for allowing me to participate in the care of your patient.      Sincerely,     Babatunde Hernandez MD     [facility]  cc:   Babatunde Hernandez MD  [address]  [place] [address]

## 2022-01-18 DIAGNOSIS — F33.0 MILD EPISODE OF RECURRENT MAJOR DEPRESSIVE DISORDER (H): ICD-10-CM

## 2022-01-18 DIAGNOSIS — I25.10 CORONARY ARTERY DISEASE INVOLVING NATIVE CORONARY ARTERY OF NATIVE HEART WITHOUT ANGINA PECTORIS: ICD-10-CM

## 2022-01-20 ENCOUNTER — MYC MEDICAL ADVICE (OUTPATIENT)
Dept: INTERNAL MEDICINE | Facility: CLINIC | Age: 57
End: 2022-01-20
Payer: COMMERCIAL

## 2022-01-20 RX ORDER — CARVEDILOL 6.25 MG/1
TABLET ORAL
Qty: 180 TABLET | Refills: 0 | Status: SHIPPED | OUTPATIENT
Start: 2022-01-20 | End: 2022-03-22

## 2022-01-20 RX ORDER — LISINOPRIL 5 MG/1
TABLET ORAL
Qty: 90 TABLET | Refills: 0 | Status: SHIPPED | OUTPATIENT
Start: 2022-01-20 | End: 2022-03-22

## 2022-01-20 RX ORDER — DESVENLAFAXINE 50 MG/1
TABLET, FILM COATED, EXTENDED RELEASE ORAL
Qty: 90 TABLET | Refills: 0 | Status: SHIPPED | OUTPATIENT
Start: 2022-01-20 | End: 2022-03-22

## 2022-01-20 NOTE — TELEPHONE ENCOUNTER
Routing refill request to provider for review/approval because:  Patient needs to be seen because:  Due for appointment    Myc message sent to patient advising scheduling an appt

## 2022-01-22 ENCOUNTER — HEALTH MAINTENANCE LETTER (OUTPATIENT)
Age: 57
End: 2022-01-22

## 2022-03-21 ASSESSMENT — ENCOUNTER SYMPTOMS
WEAKNESS: 0
ABDOMINAL PAIN: 1
SORE THROAT: 0
HEARTBURN: 0
DIZZINESS: 0
BREAST MASS: 0
HEMATURIA: 0
ARTHRALGIAS: 0
FREQUENCY: 0
COUGH: 0
HEADACHES: 1
FEVER: 0
NAUSEA: 0
SHORTNESS OF BREATH: 0
EYE PAIN: 0
NERVOUS/ANXIOUS: 1
DYSURIA: 0
CHILLS: 0
DIARRHEA: 0
PARESTHESIAS: 0
CONSTIPATION: 0
HEMATOCHEZIA: 0
JOINT SWELLING: 0
MYALGIAS: 0
PALPITATIONS: 0

## 2022-03-22 ENCOUNTER — OFFICE VISIT (OUTPATIENT)
Dept: BEHAVIORAL HEALTH | Facility: CLINIC | Age: 57
End: 2022-03-22
Payer: COMMERCIAL

## 2022-03-22 ENCOUNTER — OFFICE VISIT (OUTPATIENT)
Dept: INTERNAL MEDICINE | Facility: CLINIC | Age: 57
End: 2022-03-22
Payer: COMMERCIAL

## 2022-03-22 VITALS
TEMPERATURE: 98.3 F | DIASTOLIC BLOOD PRESSURE: 81 MMHG | HEART RATE: 90 BPM | WEIGHT: 140.4 LBS | SYSTOLIC BLOOD PRESSURE: 116 MMHG | BODY MASS INDEX: 24.88 KG/M2 | OXYGEN SATURATION: 97 % | HEIGHT: 63 IN | RESPIRATION RATE: 16 BRPM

## 2022-03-22 DIAGNOSIS — F41.1 GAD (GENERALIZED ANXIETY DISORDER): Primary | ICD-10-CM

## 2022-03-22 DIAGNOSIS — F33.0 MILD EPISODE OF RECURRENT MAJOR DEPRESSIVE DISORDER (H): ICD-10-CM

## 2022-03-22 DIAGNOSIS — Z00.00 ENCOUNTER FOR ROUTINE ADULT HEALTH EXAMINATION WITHOUT ABNORMAL FINDINGS: Primary | ICD-10-CM

## 2022-03-22 DIAGNOSIS — E03.9 ACQUIRED HYPOTHYROIDISM: ICD-10-CM

## 2022-03-22 DIAGNOSIS — R73.09 ABNORMAL BLOOD SUGAR: ICD-10-CM

## 2022-03-22 DIAGNOSIS — F41.9 ANXIETY: ICD-10-CM

## 2022-03-22 DIAGNOSIS — I25.10 CORONARY ARTERY DISEASE INVOLVING NATIVE CORONARY ARTERY OF NATIVE HEART WITHOUT ANGINA PECTORIS: ICD-10-CM

## 2022-03-22 DIAGNOSIS — G47.00 INSOMNIA, UNSPECIFIED TYPE: ICD-10-CM

## 2022-03-22 DIAGNOSIS — E78.5 HYPERLIPIDEMIA LDL GOAL <70: ICD-10-CM

## 2022-03-22 LAB — HBA1C MFR BLD: 6 % (ref 0–5.6)

## 2022-03-22 PROCEDURE — 83036 HEMOGLOBIN GLYCOSYLATED A1C: CPT | Performed by: INTERNAL MEDICINE

## 2022-03-22 PROCEDURE — G0145 SCR C/V CYTO,THINLAYER,RESCR: HCPCS | Performed by: INTERNAL MEDICINE

## 2022-03-22 PROCEDURE — 84443 ASSAY THYROID STIM HORMONE: CPT | Performed by: INTERNAL MEDICINE

## 2022-03-22 PROCEDURE — 36415 COLL VENOUS BLD VENIPUNCTURE: CPT | Performed by: INTERNAL MEDICINE

## 2022-03-22 PROCEDURE — 80061 LIPID PANEL: CPT | Performed by: INTERNAL MEDICINE

## 2022-03-22 PROCEDURE — 99396 PREV VISIT EST AGE 40-64: CPT | Performed by: INTERNAL MEDICINE

## 2022-03-22 PROCEDURE — 99207 PR NO CHARGE BEHAVIORAL WARM HANDOFF: CPT | Performed by: MARRIAGE & FAMILY THERAPIST

## 2022-03-22 PROCEDURE — 99214 OFFICE O/P EST MOD 30 MIN: CPT | Mod: 25 | Performed by: INTERNAL MEDICINE

## 2022-03-22 PROCEDURE — 87624 HPV HI-RISK TYP POOLED RSLT: CPT | Performed by: INTERNAL MEDICINE

## 2022-03-22 PROCEDURE — 80048 BASIC METABOLIC PNL TOTAL CA: CPT | Performed by: INTERNAL MEDICINE

## 2022-03-22 RX ORDER — CARVEDILOL 6.25 MG/1
6.25 TABLET ORAL 2 TIMES DAILY WITH MEALS
Qty: 180 TABLET | Refills: 3 | Status: SHIPPED | OUTPATIENT
Start: 2022-03-22 | End: 2023-04-18

## 2022-03-22 RX ORDER — LEVOTHYROXINE SODIUM 25 UG/1
TABLET ORAL
Qty: 45 TABLET | Refills: 3 | Status: SHIPPED | OUTPATIENT
Start: 2022-03-22 | End: 2023-04-10

## 2022-03-22 RX ORDER — DESVENLAFAXINE 50 MG/1
50 TABLET, FILM COATED, EXTENDED RELEASE ORAL DAILY
Qty: 90 TABLET | Refills: 3 | Status: SHIPPED | OUTPATIENT
Start: 2022-03-22 | End: 2023-04-18

## 2022-03-22 RX ORDER — LEVOTHYROXINE SODIUM 50 UG/1
TABLET ORAL
Qty: 45 TABLET | Refills: 3 | Status: SHIPPED | OUTPATIENT
Start: 2022-03-22 | End: 2022-05-18

## 2022-03-22 RX ORDER — AMLODIPINE BESYLATE 5 MG/1
5 TABLET ORAL DAILY
Qty: 90 TABLET | Refills: 3 | Status: SHIPPED | OUTPATIENT
Start: 2022-03-22 | End: 2023-05-03

## 2022-03-22 RX ORDER — ROSUVASTATIN CALCIUM 40 MG/1
40 TABLET, COATED ORAL DAILY
Qty: 90 TABLET | Refills: 3 | Status: SHIPPED | OUTPATIENT
Start: 2022-03-22 | End: 2023-05-03

## 2022-03-22 RX ORDER — LISINOPRIL 5 MG/1
5 TABLET ORAL DAILY
Qty: 90 TABLET | Refills: 3 | Status: SHIPPED | OUTPATIENT
Start: 2022-03-22 | End: 2023-04-18

## 2022-03-22 RX ORDER — ZOLPIDEM TARTRATE 5 MG/1
5 TABLET ORAL
Qty: 30 TABLET | Refills: 5 | Status: SHIPPED | OUTPATIENT
Start: 2022-03-22 | End: 2022-10-11

## 2022-03-22 RX ORDER — PROPRANOLOL HYDROCHLORIDE 20 MG/1
20 TABLET ORAL 3 TIMES DAILY PRN
Qty: 30 TABLET | Refills: 1 | Status: SHIPPED | OUTPATIENT
Start: 2022-03-22 | End: 2024-07-12

## 2022-03-22 RX ORDER — NITROGLYCERIN 0.4 MG/1
0.4 TABLET SUBLINGUAL EVERY 5 MIN PRN
Qty: 25 TABLET | Refills: 4 | Status: SHIPPED | OUTPATIENT
Start: 2022-03-22 | End: 2024-06-10

## 2022-03-22 ASSESSMENT — ENCOUNTER SYMPTOMS
HEARTBURN: 0
CHILLS: 0
MYALGIAS: 0
PARESTHESIAS: 0
CONSTIPATION: 0
NERVOUS/ANXIOUS: 1
COUGH: 0
JOINT SWELLING: 0
ABDOMINAL PAIN: 1
FEVER: 0
NAUSEA: 0
PALPITATIONS: 0
HEMATOCHEZIA: 0
FREQUENCY: 0
BREAST MASS: 0
DIARRHEA: 0
HEMATURIA: 0
WEAKNESS: 0
EYE PAIN: 0
DIZZINESS: 0
ARTHRALGIAS: 0
HEADACHES: 1
DYSURIA: 0
SHORTNESS OF BREATH: 0
SORE THROAT: 0

## 2022-03-22 ASSESSMENT — ANXIETY QUESTIONNAIRES
7. FEELING AFRAID AS IF SOMETHING AWFUL MIGHT HAPPEN: SEVERAL DAYS
2. NOT BEING ABLE TO STOP OR CONTROL WORRYING: MORE THAN HALF THE DAYS
1. FEELING NERVOUS, ANXIOUS, OR ON EDGE: SEVERAL DAYS
5. BEING SO RESTLESS THAT IT IS HARD TO SIT STILL: MORE THAN HALF THE DAYS
IF YOU CHECKED OFF ANY PROBLEMS ON THIS QUESTIONNAIRE, HOW DIFFICULT HAVE THESE PROBLEMS MADE IT FOR YOU TO DO YOUR WORK, TAKE CARE OF THINGS AT HOME, OR GET ALONG WITH OTHER PEOPLE: SOMEWHAT DIFFICULT
6. BECOMING EASILY ANNOYED OR IRRITABLE: NOT AT ALL
3. WORRYING TOO MUCH ABOUT DIFFERENT THINGS: MORE THAN HALF THE DAYS
GAD7 TOTAL SCORE: 10

## 2022-03-22 ASSESSMENT — PATIENT HEALTH QUESTIONNAIRE - PHQ9
SUM OF ALL RESPONSES TO PHQ QUESTIONS 1-9: 8
5. POOR APPETITE OR OVEREATING: MORE THAN HALF THE DAYS

## 2022-03-22 NOTE — PROGRESS NOTES
SUBJECTIVE:   CC: Fallon Pizano is an 56 year old woman who presents for preventive health visit.       Patient has been advised of split billing requirements and indicates understanding: Yes  Healthy Habits:     Getting at least 3 servings of Calcium per day:  Yes    Bi-annual eye exam:  Yes    Dental care twice a year:  NO    Sleep apnea or symptoms of sleep apnea:  None    Diet:  Low salt and Low fat/cholesterol    Frequency of exercise:  1 day/week    Duration of exercise:  15-30 minutes    Taking medications regularly:  Yes    Medication side effects:  None    PHQ-2 Total Score: 2    Additional concerns today:  Yes    Ability to successfully perform activities of daily living: Yes, no assistance needed  Home safety:  none identified   Hearing impairment: None     Problems:  1.  Coronary artery disease: She has had some chest tightness but thinks it is all anxiety related, see below.  It is not exertional or like her previous heart pain.  She does need a refill of nitroglycerin, previous prescription .  2.  Major depression: Her depressive symptoms are relatively stable but she is complaining she is having a lot more anxiety, irritability.  She has been getting physical symptoms with this including headache, some shortness of breath, chest tightness, pounding heart rate.  She would be interested in trying to work on understanding how to manage this because it is very uncomfortable.  She reports some recent stressors including her 's to undergo biopsy of the prostate for possible cancer, 14-year-old granddaughter recently tried to commit suicide.  She has not been sleeping so has been taking the Ambien every night now.  3.  Hyperlipidemia: Stable on her medication.  4.  Hypothyroidism: Clinically stable.  5.  Abnormal blood sugar: Diet is stable, weight has gone up a little bit.      Patient Active Problem List   Diagnosis     Hyperlipidemia LDL goal <70     Mild episode of  recurrent major depressive disorder (H)     CAD (coronary artery disease)     Allergic rhinitis     Abnormal blood sugar     Acquired hypothyroidism     Myalgia     Current Outpatient Medications   Medication Sig Dispense Refill     amLODIPine (NORVASC) 5 MG tablet TAKE 1 TABLET BY MOUTH EVERY DAY 90 tablet 0     aspirin (ASA) 81 MG tablet Take 1 tablet (81 mg) by mouth daily 90 tablet 3     carvedilol (COREG) 6.25 MG tablet TAKE 1 TABLET BY MOUTH TWICE A DAY WITH MEALS 180 tablet 0     coenzyme Q-10 200 MG CAPS        desvenlafaxine (PRISTIQ) 50 MG 24 hr tablet TAKE 1 TABLET BY MOUTH EVERY DAY 90 tablet 0     fluticasone (FLONASE) 50 MCG/ACT nasal spray Spray 2 sprays into both nostrils daily 16 mL 11     levothyroxine (SYNTHROID/LEVOTHROID) 25 MCG tablet TAKE 1 TABLET BY MOUTH EVERY OTHER DAY ALTERNATING WITH 50 MCG TABLETS 45 tablet 0     levothyroxine (SYNTHROID/LEVOTHROID) 50 MCG tablet TAKE 1 TABLET BY MOUTH EVERY OTHER DAY ALTERNATING WITH 25 MCG 45 tablet 0     lisinopril (ZESTRIL) 5 MG tablet TAKE 1 TABLET BY MOUTH EVERY DAY 90 tablet 0     nitroGLYcerin (NITROSTAT) 0.4 MG sublingual tablet Place 1 tablet (0.4 mg) under the tongue every 5 minutes as needed for chest pain For chest pain place 1 tablet under the tongue every 5 minutes for 3 doses. If symptoms persist 5 minutes after 1st dose call 911. 25 tablet 4     propranolol (INDERAL) 20 MG tablet Take 1 tablet (20 mg) by mouth 3 times daily as needed (anxiety) 30 tablet 1     rosuvastatin (CRESTOR) 40 MG tablet TAKE 1 TABLET BY MOUTH EVERY DAY 90 tablet 0     zolpidem (AMBIEN) 5 MG tablet Take 1 tablet (5 mg) by mouth nightly as needed for sleep 30 tablet 5            Today's PHQ-2 Score:   PHQ-2 ( 1999 Pfizer) 3/21/2022   Q1: Little interest or pleasure in doing things 1   Q2: Feeling down, depressed or hopeless 1   PHQ-2 Score 2   PHQ-2 Total Score (12-17 Years)- Positive if 3 or more points; Administer PHQ-A if positive -   Q1: Little interest or  pleasure in doing things Several days   Q2: Feeling down, depressed or hopeless Several days   PHQ-2 Score 2       Abuse: Current or Past (Physical, Sexual or Emotional) - No  Do you feel safe in your environment? Yes        Social History     Tobacco Use     Smoking status: Former Smoker     Types: Cigarettes     Quit date: 3/10/2020     Years since quittin.0     Smokeless tobacco: Never Used     Tobacco comment: smokes occ   Substance Use Topics     Alcohol use: Yes     Alcohol/week: 0.0 standard drinks     Comment: Casual     If you drink alcohol do you typically have >3 drinks per day or >7 drinks per week? No    Alcohol Use 3/21/2022   Prescreen: >3 drinks/day or >7 drinks/week? No   Prescreen: >3 drinks/day or >7 drinks/week? -   No flowsheet data found.    Reviewed orders with patient.  Reviewed health maintenance and updated orders accordingly - Yes      Breast Cancer Screening:    Breast CA Risk Assessment (FHS-7) 3/21/2022   Do you have a family history of breast, colon, or ovarian cancer? No / Unknown       click delete button to remove this line now  Mammogram Screening: Recommended mammography every 1-2 years with patient discussion and risk factor consideration  Pertinent mammograms are reviewed under the imaging tab.    History of abnormal Pap smear: NO - age 30-65 PAP every 5 years with negative HPV co-testing recommended  PAP / HPV Latest Ref Rng & Units 2017   PAP (Historical) - NIL OTHER-NIL, See Result   HPV16 NEG Negative -   HPV18 NEG Negative -   HRHPV NEG Negative -     Reviewed and updated as needed this visit by clinical staff                  Reviewed and updated as needed this visit by Provider                     Review of Systems   Constitutional: Negative for chills and fever.   HENT: Positive for ear pain. Negative for congestion, hearing loss and sore throat.    Eyes: Negative for pain and visual disturbance.   Respiratory: Negative for cough and shortness of  "breath.    Cardiovascular: Positive for chest pain. Negative for palpitations and peripheral edema.   Gastrointestinal: Positive for abdominal pain. Negative for constipation, diarrhea, heartburn, hematochezia and nausea.   Breasts:  Negative for tenderness, breast mass and discharge.   Genitourinary: Negative for dysuria, frequency, genital sores, hematuria, pelvic pain, urgency, vaginal bleeding and vaginal discharge.   Musculoskeletal: Negative for arthralgias, joint swelling and myalgias.   Skin: Negative for rash.   Neurological: Positive for headaches. Negative for dizziness, weakness and paresthesias.   Psychiatric/Behavioral: Negative for mood changes. The patient is nervous/anxious.      She does not have any ear pain  All the rest of the symptoms are related to the anxiety.       OBJECTIVE:   /81 (BP Location: Left arm, Patient Position: Sitting, Cuff Size: Adult Regular)   Pulse 90   Temp 98.3  F (36.8  C) (Oral)   Resp 16   Ht 1.588 m (5' 2.5\")   Wt 63.7 kg (140 lb 6.4 oz)   LMP 07/18/2013   SpO2 97%   BMI 25.27 kg/m    Physical Exam     HEENT: extraocular movements are intact, pupils equal and reactive to light and accommodation, TMs clear  NECK: Neck supple. No adenopathy. Thyroid symmetric, normal size,, Carotids without bruits.  PULMONARY: clear to auscultation  CARDIAC: regular rate and rhythm and no murmurs, clicks, or gallops  PULSES: 2/2 throughout  BACK: no spinal or CVAT  ABDOMINAL: Soft, nontender.  Normal bowel sounds.  No hepatosplenomegaly or abnormal masses  BREAST: No breast masses or tenderness, No axillary masses or tenderness and No galactorrhea  PELVIC: external genitalia normal, vaginal mucosa normal, cervix normal, specimen sent for pap, bimanual exam with normal uterus and adnexa,   REFLEXES: 2+ throughout    PHQ 9/26/2019 5/11/2021 3/22/2022   PHQ-9 Total Score 12 8 8   Q9: Thoughts of better off dead/self-harm past 2 weeks Not at all Not at all Not at all     ALBARO-7 " SCORE 5/21/2015 3/22/2022   Total Score 6 -   Total Score - 10           ASSESSMENT/PLAN:   (Z00.00) Encounter for routine adult health examination without abnormal findings  (primary encounter diagnosis)  Comment: Declines Covid vaccine, advised about shingles vaccine, flu shot  Plan: Pap imaged thin layer screen with HPV -         recommended age 30 - 65 years (select HPV order        below)            (F33.0) Mild episode of recurrent major depressive disorder (H)  Comment: Symptoms remain mild but stable  Plan: Continue medication    (I25.10) Coronary artery disease involving native coronary artery of native heart without angina pectoris  Comment: Overall stable  Plan: nitroGLYcerin (NITROSTAT) 0.4 MG sublingual         tablet, Lipid panel reflex to direct LDL         Fasting        Refill nitro    (F41.9) Anxiety  Comment: Because he is having very strong physical symptoms, suggest we add a small dose of quick acting propranolol to help try to alleviate the symptoms.  She does not want a medication that would make her sleepy or not be able to drive.  I also recommend working with her therapist to understand strategies on how she can help control the anxiety herself. I had our therapist stop in with her to meet her and schedule an appointment  Plan: propranolol (INDERAL) 20 MG tablet            (E78.5) Hyperlipidemia LDL goal <70  Comment: Recheck lab  Plan: Lipid panel reflex to direct LDL Fasting            (E03.9) Acquired hypothyroidism  Comment: Recheck lab  Plan: TSH with free T4 reflex            (R73.09) Abnormal blood sugar  Comment: Recheck lab  Plan: Basic metabolic panel  (Ca, Cl, CO2, Creat,         Gluc, K, Na, BUN), Hemoglobin A1c            (G47.00) Insomnia, unspecified type  Comment: Refill medication  Plan: zolpidem (AMBIEN) 5 MG tablet              Patient has been advised of split billing requirements and indicates understanding: Yes    COUNSELING:  Reviewed preventive health counseling, as  "reflected in patient instructions    Estimated body mass index is 24.22 kg/m  as calculated from the following:    Height as of 1/11/22: 1.626 m (5' 4\").    Weight as of 1/11/22: 64 kg (141 lb 1.6 oz).        She reports that she quit smoking about 2 years ago. Her smoking use included cigarettes. She has never used smokeless tobacco.      Counseling Resources:  ATP IV Guidelines  Pooled Cohorts Equation Calculator  Breast Cancer Risk Calculator  BRCA-Related Cancer Risk Assessment: FHS-7 Tool  FRAX Risk Assessment  ICSI Preventive Guidelines  Dietary Guidelines for Americans, 2010  USDA's MyPlate  ASA Prophylaxis  Lung CA Screening    Gabriella Carter MD  [facility]  "

## 2022-03-22 NOTE — NURSING NOTE
"/81 (BP Location: Left arm, Patient Position: Sitting, Cuff Size: Adult Regular)   Pulse 90   Temp 98.3  F (36.8  C) (Oral)   Resp 16   Ht 1.588 m (5' 2.5\")   Wt 63.7 kg (140 lb 6.4 oz)   LMP 07/18/2013   SpO2 97%   BMI 25.27 kg/m      "

## 2022-03-22 NOTE — PROGRESS NOTES
Patient had appointment with his/her primary care physician. [facility] services were requested / offered. No immediate safety/risk issues were reported or identified.  Explained the role of the [facility] and provided informational handout and contact information for the [facility]. Scheduled for 3/30/22    RAKESH Hanna, Behavioral Health Clinician

## 2022-03-23 LAB
ANION GAP SERPL CALCULATED.3IONS-SCNC: 4 MMOL/L (ref 3–14)
BUN SERPL-MCNC: 11 MG/DL (ref 7–30)
CALCIUM SERPL-MCNC: 9.2 MG/DL (ref 8.5–10.1)
CHLORIDE BLD-SCNC: 111 MMOL/L (ref 94–109)
CHOLEST SERPL-MCNC: 133 MG/DL
CO2 SERPL-SCNC: 25 MMOL/L (ref 20–32)
CREAT SERPL-MCNC: 0.71 MG/DL (ref 0.52–1.04)
FASTING STATUS PATIENT QL REPORTED: YES
GFR SERPL CREATININE-BSD FRML MDRD: >90 ML/MIN/1.73M2
GLUCOSE BLD-MCNC: 119 MG/DL (ref 70–99)
HDLC SERPL-MCNC: 51 MG/DL
LDLC SERPL CALC-MCNC: 37 MG/DL
NONHDLC SERPL-MCNC: 82 MG/DL
POTASSIUM BLD-SCNC: 4.4 MMOL/L (ref 3.4–5.3)
SODIUM SERPL-SCNC: 140 MMOL/L (ref 133–144)
TRIGL SERPL-MCNC: 224 MG/DL
TSH SERPL DL<=0.005 MIU/L-ACNC: 1.97 MU/L (ref 0.4–4)

## 2022-03-23 ASSESSMENT — ANXIETY QUESTIONNAIRES: GAD7 TOTAL SCORE: 10

## 2022-03-24 LAB
BKR LAB AP GYN ADEQUACY: NORMAL
BKR LAB AP GYN INTERPRETATION: NORMAL
BKR LAB AP HPV REFLEX: NORMAL
BKR LAB AP PREVIOUS ABNORMAL: NORMAL
PATH REPORT.COMMENTS IMP SPEC: NORMAL
PATH REPORT.COMMENTS IMP SPEC: NORMAL
PATH REPORT.RELEVANT HX SPEC: NORMAL

## 2022-03-25 LAB
HUMAN PAPILLOMA VIRUS 16 DNA: NEGATIVE
HUMAN PAPILLOMA VIRUS 18 DNA: NEGATIVE
HUMAN PAPILLOMA VIRUS FINAL DIAGNOSIS: NORMAL
HUMAN PAPILLOMA VIRUS OTHER HR: NEGATIVE

## 2022-03-28 ASSESSMENT — ANXIETY QUESTIONNAIRES
7. FEELING AFRAID AS IF SOMETHING AWFUL MIGHT HAPPEN: SEVERAL DAYS
1. FEELING NERVOUS, ANXIOUS, OR ON EDGE: SEVERAL DAYS
7. FEELING AFRAID AS IF SOMETHING AWFUL MIGHT HAPPEN: SEVERAL DAYS
3. WORRYING TOO MUCH ABOUT DIFFERENT THINGS: SEVERAL DAYS
5. BEING SO RESTLESS THAT IT IS HARD TO SIT STILL: SEVERAL DAYS
2. NOT BEING ABLE TO STOP OR CONTROL WORRYING: MORE THAN HALF THE DAYS
6. BECOMING EASILY ANNOYED OR IRRITABLE: NOT AT ALL
4. TROUBLE RELAXING: SEVERAL DAYS
GAD7 TOTAL SCORE: 7
GAD7 TOTAL SCORE: 7

## 2022-03-29 ASSESSMENT — ANXIETY QUESTIONNAIRES: GAD7 TOTAL SCORE: 7

## 2022-03-30 ENCOUNTER — OFFICE VISIT (OUTPATIENT)
Dept: BEHAVIORAL HEALTH | Facility: CLINIC | Age: 57
End: 2022-03-30
Payer: COMMERCIAL

## 2022-03-30 DIAGNOSIS — F41.1 GAD (GENERALIZED ANXIETY DISORDER): Primary | ICD-10-CM

## 2022-03-30 DIAGNOSIS — F41.0 PANIC DISORDER WITHOUT AGORAPHOBIA: ICD-10-CM

## 2022-03-30 PROCEDURE — 90791 PSYCH DIAGNOSTIC EVALUATION: CPT | Performed by: MARRIAGE & FAMILY THERAPIST

## 2022-03-30 ASSESSMENT — COLUMBIA-SUICIDE SEVERITY RATING SCALE - C-SSRS
3. HAVE YOU BEEN THINKING ABOUT HOW YOU MIGHT KILL YOURSELF?: YES
1. HAVE YOU WISHED YOU WERE DEAD OR WISHED YOU COULD GO TO SLEEP AND NOT WAKE UP?: YES
TOTAL  NUMBER OF INTERRUPTED ATTEMPTS LIFETIME: NO
5. HAVE YOU STARTED TO WORK OUT OR WORKED OUT THE DETAILS OF HOW TO KILL YOURSELF? DO YOU INTEND TO CARRY OUT THIS PLAN?: NO
2. HAVE YOU ACTUALLY HAD ANY THOUGHTS OF KILLING YOURSELF?: YES
TOTAL  NUMBER OF ABORTED OR SELF INTERRUPTED ATTEMPTS LIFETIME: NO
1. IN THE PAST MONTH, HAVE YOU WISHED YOU WERE DEAD OR WISHED YOU COULD GO TO SLEEP AND NOT WAKE UP?: NO
4. HAVE YOU HAD THESE THOUGHTS AND HAD SOME INTENTION OF ACTING ON THEM?: NO
ATTEMPT LIFETIME: NO
2. HAVE YOU ACTUALLY HAD ANY THOUGHTS OF KILLING YOURSELF?: NO
REASONS FOR IDEATION LIFETIME: COMPLETELY TO END OR STOP THE PAIN (YOU COULDN'T GO ON LIVING WITH THE PAIN OR HOW YOU WERE FEELING)
6. HAVE YOU EVER DONE ANYTHING, STARTED TO DO ANYTHING, OR PREPARED TO DO ANYTHING TO END YOUR LIFE?: NO

## 2022-03-30 ASSESSMENT — PATIENT HEALTH QUESTIONNAIRE - PHQ9
10. IF YOU CHECKED OFF ANY PROBLEMS, HOW DIFFICULT HAVE THESE PROBLEMS MADE IT FOR YOU TO DO YOUR WORK, TAKE CARE OF THINGS AT HOME, OR GET ALONG WITH OTHER PEOPLE: NOT DIFFICULT AT ALL
SUM OF ALL RESPONSES TO PHQ QUESTIONS 1-9: 7
SUM OF ALL RESPONSES TO PHQ QUESTIONS 1-9: 7
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
## 2022-03-31 ASSESSMENT — PATIENT HEALTH QUESTIONNAIRE - PHQ9: SUM OF ALL RESPONSES TO PHQ QUESTIONS 1-9: 7

## 2022-08-26 NOTE — MR AVS SNAPSHOT
After Visit Summary   9/18/2018    Fallon Pizano    MRN: [id number]           Patient Information     Date Of Birth          1965        Visit Information        Provider Department      9/18/2018 8:40 AM Gabriella Carter MD [facility]        Today's Diagnoses     Encounter for routine adult health examination without abnormal findings    -  1    Mild episode of recurrent major depressive disorder (H)        Muscle pain        Hyperlipidemia LDL goal <70        Coronary artery disease involving native coronary artery of native heart without angina pectoris        Acquired hypothyroidism        Abnormal blood sugar        Need for prophylactic vaccination and inoculation against influenza        Screening for viral disease          Care Instructions    Stop the Atorvastatin to see if the muscle pain goes away. Contact me once you are able to tell if it is causing problems.         PREVENTIVE HEALTH RECOMMENDATIONS:     Vaccines: Get a flu shot each year. Get a tetanus shot every 10 years.     Exercise for at least 150 minutes a week (an average of 30 minutes a day, 5 days of the week). This will help you control your weight and prevent disease.    Limit alcohol to one drink per day.    No smoking.     Wear sunscreen to prevent skin cancer.     See your dentist twice a year for an exam and cleaning.    Try to get Calcium 1200 mg total per day. It is best to not take it all at once. Try to get Vitamin D at least 4795-4801 units per day.    BMI or Body Mass Index is a way of indicating weight and health risk for cardiovascular diseases, high blood pressure, diabetes.   Definitions:    Underweight is less than 18.5 and will be associated with health risk.   Normal BMI is 18.5 to 25   Overweight is 25-29   Obesity is 30 or greater   Morbid Obesity is 40 or greater or 35 or greater with diabetes, prediabetes or abnormal blood sugar, high blood pressure or elevated  Pt needs an appt cholesterol  Obesity and Morbid Obesity are associated with higher health risks. Lowering calories, exercising more may lower your BMI and even small decreases can have positive impact on lowering health risks.   Your Body mass index is 25.54 kg/(m^2)..,                Follow-ups after your visit        Your next 10 appointments already scheduled     Oct 05, 2018  9:15 AM CDT   New Visit with Babatunde Hernandez MD   [facility] ([facility])    [address]  [facility] [place] [address] [phone]              Who to contact     If you have questions or need follow up information about today's clinic visit or your schedule please contact [facility] directly at [phone].  Normal or non-critical lab and imaging results will be communicated to you by ArthroCADhart, letter or phone within 4 business days after the clinic has received the results. If you do not hear from us within 7 days, please contact the clinic through ArthroCADhart or phone. If you have a critical or abnormal lab result, we will notify you by phone as soon as possible.  Submit refill requests through Timehop or call your pharmacy and they will forward the refill request to us. Please allow 3 business days for your refill to be completed.          Additional Information About Your Visit        Timehop Information     Timehop gives you secure access to your electronic health record. If you see a primary care provider, you can also send messages to your care team and make appointments. If you have questions, please call your primary care clinic.  If you do not have a primary care provider, please call [phone] and they will assist you.        Care EveryWhere ID     This is your Care EveryWhere ID. This could be used by other organizations to access your [place] medical records  [id number]        Your Vitals Were     Pulse Temperature Respirations Height Last Period Pulse  "Oximetry    85 98.7  F (37.1  C) (Oral) 16 5' 4\" (1.626 m) 07/18/2013 96%    BMI (Body Mass Index)                   25.54 kg/m2            Blood Pressure from Last 3 Encounters:   09/18/18 122/72   04/09/18 108/71   11/22/17 96/57    Weight from Last 3 Encounters:   09/18/18 148 lb 12.8 oz (67.5 kg)   04/09/18 150 lb (68 kg)   11/22/17 150 lb (68 kg)              We Performed the Following     CBC with platelets differential     CK total     Comprehensive metabolic panel     ESR: Erythrocyte sedimentation rate     FLU VACCINE, SPLIT VIRUS, IM (QUADRIVALENT) [54291]- >3 YRS     Hemoglobin A1c     HIV Antigen Antibody Combo     TSH with free T4 reflex     Vaccine Administration, Initial [[id number]]          Today's Medication Changes          These changes are accurate as of 9/18/18  9:14 AM.  If you have any questions, ask your nurse or doctor.               These medicines have changed or have updated prescriptions.        Dose/Directions    * levothyroxine 25 MCG tablet   Commonly known as:  SYNTHROID/LEVOTHROID   This may have changed:  Another medication with the same name was removed. Continue taking this medication, and follow the directions you see here.   Used for:  Acquired hypothyroidism   Changed by:  Gabriella Carter MD        Take 1 every other day alternating with 50 mcg.   Quantity:  45 tablet   Refills:  3       * levothyroxine 50 MCG tablet   Commonly known as:  SYNTHROID/LEVOTHROID   This may have changed:  Another medication with the same name was removed. Continue taking this medication, and follow the directions you see here.   Used for:  Acquired hypothyroidism   Changed by:  Gabriella Carter MD        1 po every other day alternating with 25 mcg   Quantity:  45 tablet   Refills:  3       traZODone 50 MG tablet   Commonly known as:  DESYREL   This may have changed:  additional instructions   Used for:  Insomnia, unspecified type        Dose:  100 mg   Take 2 tablets (100 mg) by mouth At Bedtime "   Quantity:  180 tablet   Refills:  1       * Notice:  This list has 2 medication(s) that are the same as other medications prescribed for you. Read the directions carefully, and ask your doctor or other care provider to review them with you.             Primary Care Provider Office Phone # Fax #    Gabriella Carter [phone]       [address]  [facility] [address]        Equal Access to Services     [facility]: Hadii aad ku hadasho Soomaali, waaxda luqadaha, qaybta kaalmada adeegyada, waxay idiin hayaan adeeg kharash la'aan ah. So [facility] [phone].    ATENCIÓN: Si habla español, tiene a cheung disposición servicios gratuitos de asistencia lingüística. Llame al [phone].    We comply with applicable federal civil rights laws and [place] laws. We do not discriminate on the basis of race, color, national origin, age, disability, sex, sexual orientation, or gender identity.            Thank you!     Thank you for choosing [facility]  for your care. Our goal is always to provide you with excellent care. Hearing back from our patients is one way we can continue to improve our services. Please take a few minutes to complete the written survey that you may receive in the mail after your visit with us. Thank you!             Your Updated Medication List - Protect others around you: Learn how to safely use, store and throw away your medicines at www.disposemymeds.org.          This list is accurate as of 9/18/18  9:14 AM.  Always use your most recent med list.                   Brand Name Dispense Instructions for use Diagnosis    amLODIPine 5 MG tablet    NORVASC    90 tablet    TAKE 1 TABLET BY MOUTH ONCE DAILY    Other chest pain       aspirin 81 MG tablet     90 tablet    Take 1 tablet by mouth daily.        atorvastatin 40 MG tablet    LIPITOR    30 tablet    TAKE 1 TABLET (40 MG) BY MOUTH DAILY    Coronary artery disease involving native coronary artery of native heart  without angina pectoris       BENADRYL ALLERGY PO           carvedilol 6.25 MG tablet    COREG    180 tablet    TAKE 1 TABLET (6.25 MG) BY MOUTH 2 TIMES DAILY (WITH MEALS)    Coronary artery disease involving native coronary artery of native heart without angina pectoris       escitalopram 20 MG tablet    LEXAPRO    90 tablet    TAKE 1 TABLET (20 MG) BY MOUTH DAILY    Mild episode of recurrent major depressive disorder (H)       fluticasone 50 MCG/ACT spray    FLONASE    1 Bottle    Spray 1-2 sprays into both nostrils daily    Coryza       IBUPROFEN PO      Take 200 mg by mouth        * levothyroxine 25 MCG tablet    SYNTHROID/LEVOTHROID    45 tablet    Take 1 every other day alternating with 50 mcg.    Acquired hypothyroidism       * levothyroxine 50 MCG tablet    SYNTHROID/LEVOTHROID    45 tablet    1 po every other day alternating with 25 mcg    Acquired hypothyroidism       lisinopril 5 MG tablet    PRINIVIL/ZESTRIL    90 tablet    TAKE 1 TABLET BY MOUTH ONCE DAILY    Coronary artery disease involving native coronary artery of native heart without angina pectoris       nitroGLYcerin 0.4 MG sublingual tablet    NITROSTAT    25 tablet    Place 1 tablet (0.4 mg) under the tongue every 5 minutes as needed for chest pain    CAD (coronary artery disease)       traZODone 50 MG tablet    DESYREL    180 tablet    Take 2 tablets (100 mg) by mouth At Bedtime    Insomnia, unspecified type       zolpidem 5 MG tablet    AMBIEN    30 tablet    Take 1 tablet (5 mg) by mouth nightly as needed for sleep    Insomnia, unspecified type       * Notice:  This list has 2 medication(s) that are the same as other medications prescribed for you. Read the directions carefully, and ask your doctor or other care provider to review them with you.

## 2022-09-03 ENCOUNTER — HEALTH MAINTENANCE LETTER (OUTPATIENT)
Age: 57
End: 2022-09-03

## 2022-10-10 DIAGNOSIS — G47.00 INSOMNIA, UNSPECIFIED TYPE: ICD-10-CM

## 2022-10-11 RX ORDER — ZOLPIDEM TARTRATE 5 MG/1
5 TABLET ORAL
Qty: 30 TABLET | Refills: 5 | Status: SHIPPED | OUTPATIENT
Start: 2022-10-11 | End: 2023-05-02

## 2023-01-23 DIAGNOSIS — E03.9 ACQUIRED HYPOTHYROIDISM: ICD-10-CM

## 2023-01-25 RX ORDER — LEVOTHYROXINE SODIUM 50 UG/1
TABLET ORAL
Qty: 45 TABLET | Refills: 0 | Status: SHIPPED | OUTPATIENT
Start: 2023-01-25 | End: 2023-04-18

## 2023-01-25 NOTE — TELEPHONE ENCOUNTER
Pending Prescriptions:                       Disp   Refills    levothyroxine (SYNTHROID/LEVOTHROID) 50 M*45 tab*0            Sig: TAKE 1 TABLET BY MOUTH EVERY OTHER DAY           ALTERNATING WITH 25 MCG    Medication is being filled for 1 time refill only due to:  patient will be due for an appointment in March     [facility] message sent informing patient

## 2023-04-10 DIAGNOSIS — E03.9 ACQUIRED HYPOTHYROIDISM: ICD-10-CM

## 2023-04-10 RX ORDER — LEVOTHYROXINE SODIUM 25 UG/1
TABLET ORAL
Qty: 45 TABLET | Refills: 0 | Status: SHIPPED | OUTPATIENT
Start: 2023-04-10 | End: 2023-05-09

## 2023-04-11 NOTE — TELEPHONE ENCOUNTER
Pending Prescriptions:                       Disp   Refills    levothyroxine (SYNTHROID/LEVOTHROID) 25 M*45 tab*0            Sig: TAKE 1 TABLET BY MOUTH EVERY OTHER DAY           ALTERNATING WITH 50 MCG TABLETS    Medication is being filled for 1 time refill only due to:  Patient needs to be seen because it has been more than one year since last visit.     Next 5 appointments (look out 90 days)    May 09, 2023  9:30 AM  (Arrive by 9:10 AM)  Adult Preventative Visit with Gabriella Carter MD  [facility] ([facility] ) [address]  [address]  [facility] [address] [phone]

## 2023-04-17 DIAGNOSIS — I25.10 CORONARY ARTERY DISEASE INVOLVING NATIVE CORONARY ARTERY OF NATIVE HEART WITHOUT ANGINA PECTORIS: ICD-10-CM

## 2023-04-17 DIAGNOSIS — E03.9 ACQUIRED HYPOTHYROIDISM: ICD-10-CM

## 2023-04-17 DIAGNOSIS — F33.0 MILD EPISODE OF RECURRENT MAJOR DEPRESSIVE DISORDER (H): ICD-10-CM

## 2023-04-18 RX ORDER — LISINOPRIL 5 MG/1
TABLET ORAL
Qty: 90 TABLET | Refills: 0 | Status: SHIPPED | OUTPATIENT
Start: 2023-04-18 | End: 2023-05-09

## 2023-04-18 RX ORDER — LEVOTHYROXINE SODIUM 50 UG/1
TABLET ORAL
Qty: 45 TABLET | Refills: 0 | Status: SHIPPED | OUTPATIENT
Start: 2023-04-18 | End: 2023-05-09

## 2023-04-18 RX ORDER — CARVEDILOL 6.25 MG/1
TABLET ORAL
Qty: 180 TABLET | Refills: 0 | Status: SHIPPED | OUTPATIENT
Start: 2023-04-18 | End: 2023-05-09

## 2023-04-18 RX ORDER — DESVENLAFAXINE 50 MG/1
TABLET, FILM COATED, EXTENDED RELEASE ORAL
Qty: 90 TABLET | Refills: 0 | Status: SHIPPED | OUTPATIENT
Start: 2023-04-18 | End: 2023-05-09

## 2023-04-18 NOTE — TELEPHONE ENCOUNTER
Pt has appt in May with Dr Carter.     Creatinine   Date Value Ref Range Status   03/22/2022 0.71 0.52 - 1.04 mg/dL Final   04/08/2021 0.78 0.52 - 1.04 mg/dL Final       BP Readings from Last 3 Encounters:   03/22/22 116/81   01/11/22 110/74   05/11/21 99/73     Lab Results   Component Value Date    TSH 1.97 03/22/2022    TSH 1.37 04/08/2021

## 2023-04-29 ENCOUNTER — HEALTH MAINTENANCE LETTER (OUTPATIENT)
Age: 58
End: 2023-04-29

## 2023-05-01 DIAGNOSIS — G47.00 INSOMNIA, UNSPECIFIED TYPE: ICD-10-CM

## 2023-05-02 DIAGNOSIS — E78.5 HYPERLIPIDEMIA LDL GOAL <70: ICD-10-CM

## 2023-05-02 DIAGNOSIS — I25.10 CORONARY ARTERY DISEASE INVOLVING NATIVE CORONARY ARTERY OF NATIVE HEART WITHOUT ANGINA PECTORIS: ICD-10-CM

## 2023-05-02 RX ORDER — ZOLPIDEM TARTRATE 5 MG/1
5 TABLET ORAL
Qty: 30 TABLET | Refills: 5 | Status: SHIPPED | OUTPATIENT
Start: 2023-05-02 | End: 2023-11-30

## 2023-05-02 ASSESSMENT — ENCOUNTER SYMPTOMS
JOINT SWELLING: 0
WEAKNESS: 0
CHILLS: 0
FREQUENCY: 0
DIZZINESS: 0
PALPITATIONS: 0
EYE PAIN: 0
ABDOMINAL PAIN: 0
SHORTNESS OF BREATH: 0
FEVER: 0
HEADACHES: 0
PARESTHESIAS: 0
DIARRHEA: 0
CONSTIPATION: 0
NAUSEA: 0
SORE THROAT: 0
DYSURIA: 0
BREAST MASS: 0
HEMATOCHEZIA: 0
MYALGIAS: 0
HEMATURIA: 0
COUGH: 0
ARTHRALGIAS: 0
NERVOUS/ANXIOUS: 0
HEARTBURN: 0

## 2023-05-02 ASSESSMENT — PATIENT HEALTH QUESTIONNAIRE - PHQ9
SUM OF ALL RESPONSES TO PHQ QUESTIONS 1-9: 9
10. IF YOU CHECKED OFF ANY PROBLEMS, HOW DIFFICULT HAVE THESE PROBLEMS MADE IT FOR YOU TO DO YOUR WORK, TAKE CARE OF THINGS AT HOME, OR GET ALONG WITH OTHER PEOPLE: SOMEWHAT DIFFICULT
SUM OF ALL RESPONSES TO PHQ QUESTIONS 1-9: 9

## 2023-05-03 RX ORDER — ROSUVASTATIN CALCIUM 40 MG/1
TABLET, COATED ORAL
Qty: 90 TABLET | Refills: 0 | Status: SHIPPED | OUTPATIENT
Start: 2023-05-03 | End: 2023-05-09

## 2023-05-03 RX ORDER — AMLODIPINE BESYLATE 5 MG/1
TABLET ORAL
Qty: 90 TABLET | Refills: 0 | Status: SHIPPED | OUTPATIENT
Start: 2023-05-03 | End: 2023-05-09

## 2023-05-03 NOTE — TELEPHONE ENCOUNTER
Pt has appt next week.     Prescription approved per [facility] Refill Protocol.    BP Readings from Last 3 Encounters:   03/22/22 116/81   01/11/22 110/74   05/11/21 99/73     Creatinine   Date Value Ref Range Status   03/22/2022 0.71 0.52 - 1.04 mg/dL Final   04/08/2021 0.78 0.52 - 1.04 mg/dL Final     Recent Labs   Lab Test 03/22/22  1036 04/08/21  0940 01/04/16  1019 06/24/15  0814   CHOL 133 150   < > 171   HDL 51 50   < > 57   LDL 37 71   < > 68   TRIG 224* 143   < > 230*   CHOLHDLRATIO  --   --   --  3.0    < > = values in this interval not displayed.

## 2023-05-09 ENCOUNTER — OFFICE VISIT (OUTPATIENT)
Dept: INTERNAL MEDICINE | Facility: CLINIC | Age: 58
End: 2023-05-09
Payer: COMMERCIAL

## 2023-05-09 VITALS
SYSTOLIC BLOOD PRESSURE: 116 MMHG | HEART RATE: 75 BPM | BODY MASS INDEX: 24.59 KG/M2 | TEMPERATURE: 96.8 F | RESPIRATION RATE: 18 BRPM | OXYGEN SATURATION: 97 % | DIASTOLIC BLOOD PRESSURE: 79 MMHG | HEIGHT: 63 IN | WEIGHT: 138.8 LBS

## 2023-05-09 DIAGNOSIS — Z00.00 ENCOUNTER FOR ROUTINE ADULT HEALTH EXAMINATION WITHOUT ABNORMAL FINDINGS: Primary | ICD-10-CM

## 2023-05-09 DIAGNOSIS — E03.9 ACQUIRED HYPOTHYROIDISM: ICD-10-CM

## 2023-05-09 DIAGNOSIS — Z78.0 ASYMPTOMATIC POSTMENOPAUSAL STATUS: ICD-10-CM

## 2023-05-09 DIAGNOSIS — F41.1 GAD (GENERALIZED ANXIETY DISORDER): ICD-10-CM

## 2023-05-09 DIAGNOSIS — F33.0 MILD EPISODE OF RECURRENT MAJOR DEPRESSIVE DISORDER (H): ICD-10-CM

## 2023-05-09 DIAGNOSIS — E78.5 HYPERLIPIDEMIA LDL GOAL <70: ICD-10-CM

## 2023-05-09 DIAGNOSIS — I25.10 CORONARY ARTERY DISEASE INVOLVING NATIVE CORONARY ARTERY OF NATIVE HEART WITHOUT ANGINA PECTORIS: ICD-10-CM

## 2023-05-09 DIAGNOSIS — Z12.31 ENCOUNTER FOR SCREENING MAMMOGRAM FOR BREAST CANCER: ICD-10-CM

## 2023-05-09 DIAGNOSIS — R73.09 ABNORMAL BLOOD SUGAR: ICD-10-CM

## 2023-05-09 LAB — HBA1C MFR BLD: 6 % (ref 0–5.6)

## 2023-05-09 PROCEDURE — 96127 BRIEF EMOTIONAL/BEHAV ASSMT: CPT | Performed by: INTERNAL MEDICINE

## 2023-05-09 PROCEDURE — 99214 OFFICE O/P EST MOD 30 MIN: CPT | Mod: 25 | Performed by: INTERNAL MEDICINE

## 2023-05-09 PROCEDURE — 80061 LIPID PANEL: CPT | Performed by: INTERNAL MEDICINE

## 2023-05-09 PROCEDURE — 80048 BASIC METABOLIC PNL TOTAL CA: CPT | Performed by: INTERNAL MEDICINE

## 2023-05-09 PROCEDURE — 84443 ASSAY THYROID STIM HORMONE: CPT | Performed by: INTERNAL MEDICINE

## 2023-05-09 PROCEDURE — 83036 HEMOGLOBIN GLYCOSYLATED A1C: CPT | Performed by: INTERNAL MEDICINE

## 2023-05-09 PROCEDURE — 36415 COLL VENOUS BLD VENIPUNCTURE: CPT | Performed by: INTERNAL MEDICINE

## 2023-05-09 PROCEDURE — 99396 PREV VISIT EST AGE 40-64: CPT | Performed by: INTERNAL MEDICINE

## 2023-05-09 RX ORDER — DESVENLAFAXINE 50 MG/1
50 TABLET, FILM COATED, EXTENDED RELEASE ORAL DAILY
Qty: 90 TABLET | Refills: 3 | Status: SHIPPED | OUTPATIENT
Start: 2023-05-09 | End: 2024-07-02

## 2023-05-09 RX ORDER — ROSUVASTATIN CALCIUM 40 MG/1
40 TABLET, COATED ORAL DAILY
Qty: 90 TABLET | Refills: 3 | Status: SHIPPED | OUTPATIENT
Start: 2023-05-09 | End: 2024-07-12

## 2023-05-09 RX ORDER — LEVOTHYROXINE SODIUM 25 UG/1
TABLET ORAL
Qty: 45 TABLET | Refills: 3 | Status: SHIPPED | OUTPATIENT
Start: 2023-05-09 | End: 2024-06-12

## 2023-05-09 RX ORDER — CARVEDILOL 6.25 MG/1
6.25 TABLET ORAL 2 TIMES DAILY WITH MEALS
Qty: 180 TABLET | Refills: 3 | Status: SHIPPED | OUTPATIENT
Start: 2023-05-09 | End: 2024-07-12

## 2023-05-09 RX ORDER — LISINOPRIL 5 MG/1
5 TABLET ORAL DAILY
Qty: 90 TABLET | Refills: 3 | Status: SHIPPED | OUTPATIENT
Start: 2023-05-09 | End: 2024-07-02

## 2023-05-09 RX ORDER — LEVOTHYROXINE SODIUM 50 UG/1
TABLET ORAL
Qty: 45 TABLET | Refills: 3 | Status: SHIPPED | OUTPATIENT
Start: 2023-05-09 | End: 2024-06-12

## 2023-05-09 RX ORDER — AMLODIPINE BESYLATE 5 MG/1
5 TABLET ORAL DAILY
Qty: 90 TABLET | Refills: 3 | Status: SHIPPED | OUTPATIENT
Start: 2023-05-09 | End: 2024-07-12

## 2023-05-09 ASSESSMENT — ENCOUNTER SYMPTOMS
COUGH: 0
FREQUENCY: 0
ABDOMINAL PAIN: 0
PARESTHESIAS: 0
NAUSEA: 0
SHORTNESS OF BREATH: 0
SORE THROAT: 0
HEMATOCHEZIA: 0
CHILLS: 0
FEVER: 0
EYE PAIN: 0
NERVOUS/ANXIOUS: 0
BREAST MASS: 0
ARTHRALGIAS: 0
DIARRHEA: 0
PALPITATIONS: 0
DIZZINESS: 0
HEMATURIA: 0
JOINT SWELLING: 0
WEAKNESS: 0
CONSTIPATION: 0
HEARTBURN: 0
MYALGIAS: 0
HEADACHES: 0
DYSURIA: 0

## 2023-05-09 ASSESSMENT — ANXIETY QUESTIONNAIRES
GAD7 TOTAL SCORE: 9
GAD7 TOTAL SCORE: 9
7. FEELING AFRAID AS IF SOMETHING AWFUL MIGHT HAPPEN: NOT AT ALL
3. WORRYING TOO MUCH ABOUT DIFFERENT THINGS: MORE THAN HALF THE DAYS
5. BEING SO RESTLESS THAT IT IS HARD TO SIT STILL: SEVERAL DAYS
1. FEELING NERVOUS, ANXIOUS, OR ON EDGE: SEVERAL DAYS
6. BECOMING EASILY ANNOYED OR IRRITABLE: MORE THAN HALF THE DAYS
2. NOT BEING ABLE TO STOP OR CONTROL WORRYING: MORE THAN HALF THE DAYS

## 2023-05-09 ASSESSMENT — PAIN SCALES - GENERAL: PAINLEVEL: NO PAIN (0)

## 2023-05-09 ASSESSMENT — PATIENT HEALTH QUESTIONNAIRE - PHQ9: 5. POOR APPETITE OR OVEREATING: SEVERAL DAYS

## 2023-05-09 NOTE — PROGRESS NOTES
SUBJECTIVE:   CC: Fallon is an 57 year old who presents for preventive health visit.        View : No data to display.      Roomed by : Georgei Marie 05/09/2023 8:54 AM      Patient has been advised of split billing requirements and indicates understanding: Yes  Healthy Habits:     Getting at least 3 servings of Calcium per day:  Yes    Bi-annual eye exam:  Yes    Dental care twice a year:  NO    Sleep apnea or symptoms of sleep apnea:  None    Diet:  Low salt and Low fat/cholesterol    Frequency of exercise:  None    Taking medications regularly:  Yes    Medication side effects:  None    PHQ-2 Total Score: 4    Additional concerns today:  No        Hyperlipidemia Follow-Up      Are you regularly taking any medication or supplement to lower your cholesterol?   Yes- rosuvastatin    Are you having muscle aches or other side effects that you think could be caused by your cholesterol lowering medication?  No    Hypertension Follow-up      Do you check your blood pressure regularly outside of the clinic? Yes     Are you following a low salt diet? Yes    Are your blood pressures ever more than 140 on the top number (systolic) OR more   than 90 on the bottom number (diastolic), for example 140/90? No      Other problems:  1. Coronary artery disease: Reports no recent chest pains, sees cardiology next month  2.  Depression and anxiety: Symptoms are fairly well controlled.  Her stresses at home have been much better  3.  Hypothyroidism: Clinically stable  4.  Abnormal blood sugar: Weight and diet are stable      Patient Active Problem List   Diagnosis     Hyperlipidemia LDL goal <70     Mild episode of recurrent major depressive disorder (H)     CAD (coronary artery disease)     Allergic rhinitis     Abnormal blood sugar     Acquired hypothyroidism     Myalgia     ALBARO (generalized anxiety disorder)     Panic disorder without agoraphobia     Chest pain      Current Outpatient Medications   Medication Sig Dispense Refill      amLODIPine (NORVASC) 5 MG tablet Take 1 tablet (5 mg) by mouth daily 90 tablet 3     aspirin (ASA) 81 MG tablet Take 1 tablet (81 mg) by mouth daily 90 tablet 3     carvedilol (COREG) 6.25 MG tablet Take 1 tablet (6.25 mg) by mouth 2 times daily (with meals) 180 tablet 3     coenzyme Q-10 200 MG CAPS        desvenlafaxine (PRISTIQ) 50 MG 24 hr tablet Take 1 tablet (50 mg) by mouth daily 90 tablet 3     fluticasone (FLONASE) 50 MCG/ACT nasal spray Spray 2 sprays into both nostrils daily 16 mL 11     levothyroxine (SYNTHROID/LEVOTHROID) 25 MCG tablet TAKE 1 TABLET BY MOUTH EVERY OTHER DAY ALTERNATING WITH 50 MCG TABLETS 45 tablet 3     levothyroxine (SYNTHROID/LEVOTHROID) 50 MCG tablet TAKE 1 TABLET BY MOUTH EVERY OTHER DAY ALTERNATING WITH 25 MCG 45 tablet 3     lisinopril (ZESTRIL) 5 MG tablet Take 1 tablet (5 mg) by mouth daily 90 tablet 3     nitroGLYcerin (NITROSTAT) 0.4 MG sublingual tablet Place 1 tablet (0.4 mg) under the tongue every 5 minutes as needed for chest pain For chest pain place 1 tablet under the tongue every 5 minutes for 3 doses. If symptoms persist 5 minutes after 1st dose call 911. 25 tablet 4     propranolol (INDERAL) 20 MG tablet Take 1 tablet (20 mg) by mouth 3 times daily as needed (anxiety) 30 tablet 1     rosuvastatin (CRESTOR) 40 MG tablet TAKE 1 TABLET BY MOUTH EVERY DAY 90 tablet 0     zolpidem (AMBIEN) 5 MG tablet TAKE 1 TABLET (5 MG) BY MOUTH NIGHTLY AS NEEDED FOR SLEEP 30 tablet 5        Today's PHQ-2 Score:       5/2/2023     3:49 PM   PHQ-2 ( 1999 [employer])   Q1: Little interest or pleasure in doing things 2   Q2: Feeling down, depressed or hopeless 2   PHQ-2 Score 4   Q1: Little interest or pleasure in doing things More than half the days   Q2: Feeling down, depressed or hopeless More than half the days   PHQ-2 Score 4           Social History     Tobacco Use     Smoking status: Former     Packs/day: 0.25     Years: 26.00     Pack years: 6.50     Types: Cigarettes     Start date:  6/17/1994     Quit date: 3/10/2020     Years since quitting: 3.1     Smokeless tobacco: Never     Tobacco comments:     smokes occ   Vaping Use     Vaping status: Not on file   Substance Use Topics     Alcohol use: Yes     Alcohol/week: 0.0 standard drinks of alcohol     Comment: Casual             5/2/2023     3:49 PM   Alcohol Use   Prescreen: >3 drinks/day or >7 drinks/week? No     Reviewed orders with patient.  Reviewed health maintenance and updated orders accordingly - Yes      Breast Cancer Screening:        3/21/2022     9:50 AM   Breast CA Risk Assessment (FHS-7)   Do you have a family history of breast, colon, or ovarian cancer? No / Unknown         Mammogram Screening: Recommended mammography every 1-2 years with patient discussion and risk factor consideration  Pertinent mammograms are reviewed under the imaging tab.    History of abnormal Pap smear: NO - age 30-65 PAP every 5 years with negative HPV co-testing recommended      Latest Ref Rng & Units 3/22/2022    10:38 AM 4/6/2017     9:20 AM 4/6/2017     8:48 AM   PAP / HPV   PAP  Negative for Intraepithelial Lesion or Malignancy (NILM)       PAP (Historical)    NIL     HPV 16 DNA Negative Negative   Negative      HPV 18 DNA Negative Negative   Negative      Other HR HPV Negative Negative   Negative        Reviewed and updated as needed this visit by clinical staff                  Reviewed and updated as needed this visit by Provider                     Review of Systems   Constitutional: Negative for chills and fever.   HENT: Negative for congestion, ear pain, hearing loss and sore throat.    Eyes: Negative for pain and visual disturbance.   Respiratory: Negative for cough and shortness of breath.    Cardiovascular: Negative for chest pain, palpitations and peripheral edema.   Gastrointestinal: Negative for abdominal pain, constipation, diarrhea, heartburn, hematochezia and nausea.   Breasts:  Negative for tenderness, breast mass and discharge.  "  Genitourinary: Negative for dysuria, frequency, genital sores, hematuria, pelvic pain, urgency, vaginal bleeding and vaginal discharge.   Musculoskeletal: Negative for arthralgias, joint swelling and myalgias.   Skin: Negative for rash.   Neurological: Negative for dizziness, weakness, headaches and paresthesias.   Psychiatric/Behavioral: Positive for mood changes. The patient is not nervous/anxious.           OBJECTIVE:   /79   Pulse 75   Temp 96.8  F (36  C) (Tympanic)   Resp 18   Ht 1.588 m (5' 2.5\")   Wt 63 kg (138 lb 12.8 oz)   LMP  (LMP Unknown)   SpO2 97%   Breastfeeding No   BMI 24.98 kg/m    Physical Exam     HEENT: extraocular movements are intact, pupils equal and reactive to light and accommodation, TMs clear  NECK: Neck supple. No adenopathy. Thyroid symmetric, normal size,, Carotids without bruits.  PULMONARY: clear to auscultation  CARDIAC: regular rate and rhythm and no murmurs, clicks, or gallops  PULSES: 2/2 throughout  BACK: no spinal or CVAT  ABDOMINAL: Soft, nontender.  Normal bowel sounds.  No hepatosplenomegaly or abnormal masses  BREAST: Bilateral implants present, no masses, no axillary adenopathy, no tenderness  REFLEXES: 2+ throughout        3/30/2022     9:22 AM 4/22/2022    12:38 PM 5/2/2023     3:49 PM   PHQ   PHQ-9 Total Score 7 4    4 9   Q9: Thoughts of better off dead/self-harm past 2 weeks Not at all Not at all    Not at all Not at all         3/28/2022     2:29 PM 4/22/2022    12:39 PM 5/9/2023     9:40 AM   ALBARO-7 SCORE   Total Score 7 (mild anxiety) 7 (mild anxiety)    Total Score 7 7    7 9           ASSESSMENT/PLAN:   (Z00.00) Encounter for routine adult health examination without abnormal findings  (primary encounter diagnosis)  Comment: Advised that she can consider getting shingles shot, she declines other shots  Plan:     (F33.0) Mild episode of recurrent major depressive disorder (H)  Comment: Symptoms are fairly well controlled, continue medication  Plan: " desvenlafaxine (PRISTIQ) 50 MG 24 hr tablet            (I25.10) Coronary artery disease involving native coronary artery of native heart without angina pectoris  Comment: Currently no symptoms of chest pain, continue medication, follow-up cardiology as scheduled  Plan: amLODIPine (NORVASC) 5 MG tablet, carvedilol         (COREG) 6.25 MG tablet, lisinopril (ZESTRIL) 5         MG tablet            (E78.5) Hyperlipidemia LDL goal <70  Comment: Recheck lab today  Plan: Lipid panel reflex to direct LDL Fasting            (E03.9) Acquired hypothyroidism  Comment: Clinically stable, check lab  Plan: TSH with free T4 reflex, levothyroxine         (SYNTHROID/LEVOTHROID) 25 MCG tablet,         levothyroxine (SYNTHROID/LEVOTHROID) 50 MCG         tablet            (R73.09) Abnormal blood sugar  Comment: Recheck lab  Plan: Basic metabolic panel  (Ca, Cl, CO2, Creat,         Gluc, K, Na, BUN), Hemoglobin A1c            (F41.1) ALBARO (generalized anxiety disorder)  Comment: Symptoms are fairly controlled, she does not wish to change medication  Plan: VT BEHAV ASSMT W/SCORE & DOCD/STAND INSTRUMENT            (Z12.31) Encounter for screening mammogram for breast cancer  Comment:   Plan: *MA Screening Digital Bilateral            (Z78.0) Asymptomatic postmenopausal status  Comment:   Plan: DX Hip/Pelvis/Spine              Patient has been advised of split billing requirements and indicates understanding: Yes      COUNSELING:  Reviewed preventive health counseling, as reflected in patient instructions        She reports that she quit smoking about 3 years ago. Her smoking use included cigarettes. She started smoking about 28 years ago. She has a 6.50 pack-year smoking history. She has never used smokeless tobacco.      Gabriella Carter MD  [employer]  Answers for HPI/ROS submitted by the patient on 5/2/2023  If you checked off any problems, how difficult have these problems made it for you to do your work, take  care of things at home, or get along with other people?: Somewhat difficult  PHQ9 TOTAL SCORE: 9

## 2023-05-10 LAB
ANION GAP SERPL CALCULATED.3IONS-SCNC: 11 MMOL/L (ref 7–15)
BUN SERPL-MCNC: 11.2 MG/DL (ref 6–20)
CALCIUM SERPL-MCNC: 9.6 MG/DL (ref 8.6–10)
CHLORIDE SERPL-SCNC: 107 MMOL/L (ref 98–107)
CHOLEST SERPL-MCNC: 145 MG/DL
CREAT SERPL-MCNC: 0.74 MG/DL (ref 0.51–0.95)
DEPRECATED HCO3 PLAS-SCNC: 23 MMOL/L (ref 22–29)
GFR SERPL CREATININE-BSD FRML MDRD: >90 ML/MIN/1.73M2
GLUCOSE SERPL-MCNC: 125 MG/DL (ref 70–99)
HDLC SERPL-MCNC: 52 MG/DL
LDLC SERPL CALC-MCNC: 53 MG/DL
NONHDLC SERPL-MCNC: 93 MG/DL
POTASSIUM SERPL-SCNC: 4.5 MMOL/L (ref 3.4–5.3)
SODIUM SERPL-SCNC: 141 MMOL/L (ref 136–145)
TRIGL SERPL-MCNC: 201 MG/DL
TSH SERPL DL<=0.005 MIU/L-ACNC: 1.76 UIU/ML (ref 0.3–4.2)

## 2023-06-06 ENCOUNTER — HOSPITAL ENCOUNTER (OUTPATIENT)
Dept: MAMMOGRAPHY | Facility: CLINIC | Age: 58
Discharge: HOME | End: 2023-06-06
Attending: INTERNAL MEDICINE | Admitting: INTERNAL MEDICINE
Payer: COMMERCIAL

## 2023-06-06 DIAGNOSIS — Z12.31 ENCOUNTER FOR SCREENING MAMMOGRAM FOR BREAST CANCER: ICD-10-CM

## 2023-06-06 PROCEDURE — 77067 SCR MAMMO BI INCL CAD: CPT

## 2023-06-28 ENCOUNTER — OFFICE VISIT (OUTPATIENT)
Dept: CARDIOLOGY | Facility: CLINIC | Age: 58
End: 2023-06-28
Attending: INTERNAL MEDICINE
Payer: COMMERCIAL

## 2023-06-28 VITALS
SYSTOLIC BLOOD PRESSURE: 118 MMHG | HEART RATE: 64 BPM | WEIGHT: 137.6 LBS | DIASTOLIC BLOOD PRESSURE: 78 MMHG | OXYGEN SATURATION: 98 % | HEIGHT: 63 IN | BODY MASS INDEX: 24.38 KG/M2

## 2023-06-28 DIAGNOSIS — E78.2 MIXED HYPERLIPIDEMIA: ICD-10-CM

## 2023-06-28 DIAGNOSIS — I25.10 CORONARY ARTERY DISEASE INVOLVING NATIVE CORONARY ARTERY OF NATIVE HEART WITHOUT ANGINA PECTORIS: ICD-10-CM

## 2023-06-28 PROCEDURE — 99214 OFFICE O/P EST MOD 30 MIN: CPT | Performed by: INTERNAL MEDICINE

## 2023-06-28 NOTE — LETTER
"6/28/2023    Gabriella Carter MD  [address]  [facility] [address]    RE: Fallon Pizano       Dear Colleague,     I had the pleasure of seeing Fallon Pizano in the [facility].  Cardiology Progress Note          Assessment and Plan:       Coronary artery disease with history of PCI to the LAD 2011  Currently asymptomatic.  Good blood pressure control in general.  Good LDL control.      Occasional flushing without palpitations or elevated heart rates, patient notices association with sports drinks  Blood pressure is slightly elevated at those times, she will try to avoid the sports drinks    Routine follow-up in 1 year per patient request    30 minutes was spent with the patient, precharting and reviewing tests as well as post visit charting all done today..    This note was transcribed using electronic voice recognition software and there may be typographical errors present.                    Interval History:     The patient is a very pleasant 58 year old whom I have been following for a number of years for coronary artery disease status post PCI to the LAD 2011 without recurrent symptoms, excellently medically managed.                     Review of Systems:     Review of Systems:  Skin:  not assessed     Eyes:  Positive for glasses;contacts  ENT:  not assessed    Respiratory:  Negative    Cardiovascular:  Negative    Gastroenterology: not assessed    Genitourinary:  not assessed    Musculoskeletal:  not assessed    Neurologic:  not assessed    Psychiatric:  not assessed    Heme/Lymph/Imm:  not assessed    Endocrine:  not assessed                Physical Exam:     Vitals: /78 (BP Location: Right arm, Patient Position: Sitting, Cuff Size: Adult Regular)   Pulse 64   Ht 1.6 m (5' 3\")   Wt 62.4 kg (137 lb 9.6 oz)   LMP  (LMP Unknown)   SpO2 98%   BMI 24.37 kg/m    Constitutional:  cooperative, alert and oriented, well developed, well nourished, " in no acute distress        Skin:  warm and dry to the touch;no apparent skin lesions or masses noted        Head:  normocephalic, no masses or lesions        Eyes:  pupils equal and round, conjunctivae and lids unremarkable, sclera white, no xanthalasma, EOMS intact, no nystagmus        Chest:  normal symmetry        Cardiac: regular rhythm;normal S1 and S2                  Extremities and Back:  no edema;no deformities, clubbing, cyanosis, erythema observed        Neurological:  affect appropriate;no gross motor deficits                 Medications:     Current Outpatient Medications   Medication Sig Dispense Refill    amLODIPine (NORVASC) 5 MG tablet Take 1 tablet (5 mg) by mouth daily 90 tablet 3    aspirin (ASA) 81 MG tablet Take 1 tablet (81 mg) by mouth daily 90 tablet 3    carvedilol (COREG) 6.25 MG tablet Take 1 tablet (6.25 mg) by mouth 2 times daily (with meals) 180 tablet 3    coenzyme Q-10 200 MG CAPS       desvenlafaxine (PRISTIQ) 50 MG 24 hr tablet Take 1 tablet (50 mg) by mouth daily 90 tablet 3    fluticasone (FLONASE) 50 MCG/ACT nasal spray Spray 2 sprays into both nostrils daily 16 mL 11    levothyroxine (SYNTHROID/LEVOTHROID) 25 MCG tablet TAKE 1 TABLET BY MOUTH EVERY OTHER DAY ALTERNATING WITH 50 MCG TABLETS 45 tablet 3    levothyroxine (SYNTHROID/LEVOTHROID) 50 MCG tablet TAKE 1 TABLET BY MOUTH EVERY OTHER DAY ALTERNATING WITH 25 MCG 45 tablet 3    lisinopril (ZESTRIL) 5 MG tablet Take 1 tablet (5 mg) by mouth daily 90 tablet 3    nitroGLYcerin (NITROSTAT) 0.4 MG sublingual tablet Place 1 tablet (0.4 mg) under the tongue every 5 minutes as needed for chest pain For chest pain place 1 tablet under the tongue every 5 minutes for 3 doses. If symptoms persist 5 minutes after 1st dose call 911. 25 tablet 4    propranolol (INDERAL) 20 MG tablet Take 1 tablet (20 mg) by mouth 3 times daily as needed (anxiety) 30 tablet 1    rosuvastatin (CRESTOR) 40 MG tablet Take 1 tablet (40 mg) by mouth daily 90  tablet 3    zolpidem (AMBIEN) 5 MG tablet TAKE 1 TABLET (5 MG) BY MOUTH NIGHTLY AS NEEDED FOR SLEEP 30 tablet 5                Data:   All laboratory data reviewed  No results found for this or any previous visit (from the past 24 hour(s)).    All laboratory data reviewed  Lab Results   Component Value Date    CHOL 145 05/09/2023    CHOL 150 04/08/2021     Lab Results   Component Value Date    HDL 52 05/09/2023    HDL 50 04/08/2021     Lab Results   Component Value Date    LDL 53 05/09/2023    LDL 71 04/08/2021     Lab Results   Component Value Date    TRIG 201 05/09/2023    TRIG 143 04/08/2021     Lab Results   Component Value Date    CHOLHDLRATIO 3.0 06/24/2015     TSH   Date Value Ref Range Status   05/09/2023 1.76 0.30 - 4.20 uIU/mL Final   03/22/2022 1.97 0.40 - 4.00 mU/L Final   04/08/2021 1.37 0.40 - 4.00 mU/L Final     Last Basic Metabolic Panel:  Lab Results   Component Value Date     05/09/2023     04/08/2021      Lab Results   Component Value Date    POTASSIUM 4.5 05/09/2023    POTASSIUM 4.4 03/22/2022    POTASSIUM 4.5 04/08/2021     Lab Results   Component Value Date    CHLORIDE 107 05/09/2023    CHLORIDE 111 03/22/2022    CHLORIDE 111 04/08/2021     Lab Results   Component Value Date    SHANTELL 9.6 05/09/2023    SHANTELL 9.1 04/08/2021     Lab Results   Component Value Date    CO2 23 05/09/2023    CO2 25 03/22/2022    CO2 29 04/08/2021     Lab Results   Component Value Date    BUN 11.2 05/09/2023    BUN 11 03/22/2022    BUN 11 04/08/2021     Lab Results   Component Value Date    CR 0.74 05/09/2023    CR 0.78 04/08/2021     Lab Results   Component Value Date     05/09/2023     03/22/2022     04/08/2021     Lab Results   Component Value Date    WBC 4.9 03/09/2020     Lab Results   Component Value Date    RBC 5.04 03/09/2020     Lab Results   Component Value Date    HGB 15.1 03/09/2020     Lab Results   Component Value Date    HCT 47.0 03/09/2020     Lab Results   Component Value  Date    MCV 93 03/09/2020     Lab Results   Component Value Date    MCH 30.0 03/09/2020     Lab Results   Component Value Date    MCHC 32.1 03/09/2020     Lab Results   Component Value Date    RDW 13.2 03/09/2020     Lab Results   Component Value Date     03/09/2020                   Thank you for allowing me to participate in the care of your patient.      Sincerely,     Babatunde Hernandez MD     [facility]  cc:   Babatunde Hernandez MD  [address]  [place] [address]

## 2023-06-28 NOTE — PROGRESS NOTES
"Cardiology Progress Note          Assessment and Plan:       1. Coronary artery disease with history of PCI to the LAD 2011    Currently asymptomatic.  Good blood pressure control in general.  Good LDL control.      2. Occasional flushing without palpitations or elevated heart rates, patient notices association with sports drinks    Blood pressure is slightly elevated at those times, she will try to avoid the sports drinks    Routine follow-up in 1 year per patient request    30 minutes was spent with the patient, precharting and reviewing tests as well as post visit charting all done today..    This note was transcribed using electronic voice recognition software and there may be typographical errors present.                    Interval History:     The patient is a very pleasant 58 year old whom I have been following for a number of years for coronary artery disease status post PCI to the LAD 2011 without recurrent symptoms, excellently medically managed.                     Review of Systems:     Review of Systems:  Skin:  not assessed     Eyes:  Positive for glasses;contacts  ENT:  not assessed    Respiratory:  Negative    Cardiovascular:  Negative    Gastroenterology: not assessed    Genitourinary:  not assessed    Musculoskeletal:  not assessed    Neurologic:  not assessed    Psychiatric:  not assessed    Heme/Lymph/Imm:  not assessed    Endocrine:  not assessed                Physical Exam:     Vitals: /78 (BP Location: Right arm, Patient Position: Sitting, Cuff Size: Adult Regular)   Pulse 64   Ht 1.6 m (5' 3\")   Wt 62.4 kg (137 lb 9.6 oz)   LMP  (LMP Unknown)   SpO2 98%   BMI 24.37 kg/m    Constitutional:  cooperative, alert and oriented, well developed, well nourished, in no acute distress        Skin:  warm and dry to the touch;no apparent skin lesions or masses noted        Head:  normocephalic, no masses or lesions        Eyes:  pupils equal and round, conjunctivae and lids unremarkable, " sclera white, no xanthalasma, EOMS intact, no nystagmus        Chest:  normal symmetry        Cardiac: regular rhythm;normal S1 and S2                  Extremities and Back:  no edema;no deformities, clubbing, cyanosis, erythema observed        Neurological:  affect appropriate;no gross motor deficits                 Medications:     Current Outpatient Medications   Medication Sig Dispense Refill     amLODIPine (NORVASC) 5 MG tablet Take 1 tablet (5 mg) by mouth daily 90 tablet 3     aspirin (ASA) 81 MG tablet Take 1 tablet (81 mg) by mouth daily 90 tablet 3     carvedilol (COREG) 6.25 MG tablet Take 1 tablet (6.25 mg) by mouth 2 times daily (with meals) 180 tablet 3     coenzyme Q-10 200 MG CAPS        desvenlafaxine (PRISTIQ) 50 MG 24 hr tablet Take 1 tablet (50 mg) by mouth daily 90 tablet 3     fluticasone (FLONASE) 50 MCG/ACT nasal spray Spray 2 sprays into both nostrils daily 16 mL 11     levothyroxine (SYNTHROID/LEVOTHROID) 25 MCG tablet TAKE 1 TABLET BY MOUTH EVERY OTHER DAY ALTERNATING WITH 50 MCG TABLETS 45 tablet 3     levothyroxine (SYNTHROID/LEVOTHROID) 50 MCG tablet TAKE 1 TABLET BY MOUTH EVERY OTHER DAY ALTERNATING WITH 25 MCG 45 tablet 3     lisinopril (ZESTRIL) 5 MG tablet Take 1 tablet (5 mg) by mouth daily 90 tablet 3     nitroGLYcerin (NITROSTAT) 0.4 MG sublingual tablet Place 1 tablet (0.4 mg) under the tongue every 5 minutes as needed for chest pain For chest pain place 1 tablet under the tongue every 5 minutes for 3 doses. If symptoms persist 5 minutes after 1st dose call 911. 25 tablet 4     propranolol (INDERAL) 20 MG tablet Take 1 tablet (20 mg) by mouth 3 times daily as needed (anxiety) 30 tablet 1     rosuvastatin (CRESTOR) 40 MG tablet Take 1 tablet (40 mg) by mouth daily 90 tablet 3     zolpidem (AMBIEN) 5 MG tablet TAKE 1 TABLET (5 MG) BY MOUTH NIGHTLY AS NEEDED FOR SLEEP 30 tablet 5                Data:   All laboratory data reviewed  No results found for this or any previous visit  (from the past 24 hour(s)).    All laboratory data reviewed  Lab Results   Component Value Date    CHOL 145 05/09/2023    CHOL 150 04/08/2021     Lab Results   Component Value Date    HDL 52 05/09/2023    HDL 50 04/08/2021     Lab Results   Component Value Date    LDL 53 05/09/2023    LDL 71 04/08/2021     Lab Results   Component Value Date    TRIG 201 05/09/2023    TRIG 143 04/08/2021     Lab Results   Component Value Date    CHOLHDLRATIO 3.0 06/24/2015     TSH   Date Value Ref Range Status   05/09/2023 1.76 0.30 - 4.20 uIU/mL Final   03/22/2022 1.97 0.40 - 4.00 mU/L Final   04/08/2021 1.37 0.40 - 4.00 mU/L Final     Last Basic Metabolic Panel:  Lab Results   Component Value Date     05/09/2023     04/08/2021      Lab Results   Component Value Date    POTASSIUM 4.5 05/09/2023    POTASSIUM 4.4 03/22/2022    POTASSIUM 4.5 04/08/2021     Lab Results   Component Value Date    CHLORIDE 107 05/09/2023    CHLORIDE 111 03/22/2022    CHLORIDE 111 04/08/2021     Lab Results   Component Value Date    SHANTELL 9.6 05/09/2023    SHANTELL 9.1 04/08/2021     Lab Results   Component Value Date    CO2 23 05/09/2023    CO2 25 03/22/2022    CO2 29 04/08/2021     Lab Results   Component Value Date    BUN 11.2 05/09/2023    BUN 11 03/22/2022    BUN 11 04/08/2021     Lab Results   Component Value Date    CR 0.74 05/09/2023    CR 0.78 04/08/2021     Lab Results   Component Value Date     05/09/2023     03/22/2022     04/08/2021     Lab Results   Component Value Date    WBC 4.9 03/09/2020     Lab Results   Component Value Date    RBC 5.04 03/09/2020     Lab Results   Component Value Date    HGB 15.1 03/09/2020     Lab Results   Component Value Date    HCT 47.0 03/09/2020     Lab Results   Component Value Date    MCV 93 03/09/2020     Lab Results   Component Value Date    MCH 30.0 03/09/2020     Lab Results   Component Value Date    MCHC 32.1 03/09/2020     Lab Results   Component Value Date    RDW 13.2 03/09/2020      Lab Results   Component Value Date     03/09/2020

## 2023-08-09 ENCOUNTER — ANCILLARY PROCEDURE (OUTPATIENT)
Dept: BONE DENSITY | Facility: CLINIC | Age: 58
End: 2023-08-09
Attending: INTERNAL MEDICINE
Payer: COMMERCIAL

## 2023-08-09 DIAGNOSIS — Z78.0 ASYMPTOMATIC POSTMENOPAUSAL STATUS: ICD-10-CM

## 2023-08-09 PROCEDURE — 77080 DXA BONE DENSITY AXIAL: CPT | Performed by: INTERNAL MEDICINE

## 2023-11-27 ENCOUNTER — OFFICE VISIT (OUTPATIENT)
Dept: INTERNAL MEDICINE | Facility: CLINIC | Age: 58
End: 2023-11-27
Payer: COMMERCIAL

## 2023-11-27 VITALS
WEIGHT: 135 LBS | DIASTOLIC BLOOD PRESSURE: 84 MMHG | SYSTOLIC BLOOD PRESSURE: 124 MMHG | TEMPERATURE: 97.8 F | OXYGEN SATURATION: 97 % | RESPIRATION RATE: 16 BRPM | BODY MASS INDEX: 23.92 KG/M2 | HEART RATE: 66 BPM | HEIGHT: 63 IN

## 2023-11-27 DIAGNOSIS — M62.830 BACK MUSCLE SPASM: Primary | ICD-10-CM

## 2023-11-27 DIAGNOSIS — M81.0 AGE-RELATED OSTEOPOROSIS WITHOUT CURRENT PATHOLOGICAL FRACTURE: ICD-10-CM

## 2023-11-27 PROCEDURE — 99214 OFFICE O/P EST MOD 30 MIN: CPT | Performed by: INTERNAL MEDICINE

## 2023-11-27 RX ORDER — CYCLOBENZAPRINE HCL 10 MG
10 TABLET ORAL 3 TIMES DAILY PRN
Qty: 90 TABLET | Refills: 0 | Status: SHIPPED | OUTPATIENT
Start: 2023-11-27 | End: 2024-07-12

## 2023-11-27 RX ORDER — ALENDRONATE SODIUM 70 MG/1
70 TABLET ORAL
Qty: 13 TABLET | Refills: 3 | Status: SHIPPED | OUTPATIENT
Start: 2023-11-27

## 2023-11-27 ASSESSMENT — PATIENT HEALTH QUESTIONNAIRE - PHQ9
SUM OF ALL RESPONSES TO PHQ QUESTIONS 1-9: 3
SUM OF ALL RESPONSES TO PHQ QUESTIONS 1-9: 3
10. IF YOU CHECKED OFF ANY PROBLEMS, HOW DIFFICULT HAVE THESE PROBLEMS MADE IT FOR YOU TO DO YOUR WORK, TAKE CARE OF THINGS AT HOME, OR GET ALONG WITH OTHER PEOPLE: NOT DIFFICULT AT ALL

## 2023-11-27 NOTE — PROGRESS NOTES
Assessment & Plan     Back muscle spasm  She will use tylenol and will add flexeril. Follow up if no improvement.   - cyclobenzaprine (FLEXERIL) 10 MG tablet; Take 1 tablet (10 mg) by mouth 3 times daily as needed for muscle spasms    Age-related osteoporosis without current pathological fracture  Will start Fosamax and recheck DEXA in 2 years.  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  She exercises with enough effort to increase her heart rate 3 or less days per week.   She is taking medications regularly.     Saturday after trimming the tree she woke up in the middle of the night with severe back spasm in the left flank area. Is better during the day when up and moving but still bad. She has been taking Motrin 400 mg bid only as she is on coumadin. Is not helping much. No history of back issues.     Recent DEXA showed T score of -3.3 in both hips. She has no history of fracture or family history of osteoporosis. She did smoke up until she was 45.       Review of Systems   Constitutional, HEENT, cardiovascular, pulmonary, GI, , musculoskeletal, neuro, skin, endocrine and psych systems are negative, except as otherwise noted.     "  Objective    /84   Pulse 66   Temp 97.8  F (36.6  C) (Oral)   Resp 16   Ht 1.6 m (5' 3\")   Wt 61.2 kg (135 lb)   LMP  (LMP Unknown)   SpO2 97%   Breastfeeding No   BMI 23.91 kg/m    Body mass index is 23.91 kg/m .  Physical Exam   GENERAL: healthy, alert and no distress  NECK: no adenopathy, no asymmetry, masses, or scars and thyroid normal to palpation  RESP: lungs clear to auscultation - no rales, rhonchi or wheezes  CV: regular rate and rhythm, normal S1 S2, no S3 or S4, no murmur, click or rub, no peripheral edema and peripheral pulses strong  ABDOMEN: soft, nontender, no hepatosplenomegaly, no masses and bowel sounds normal  MS: can barely make it out of the chair. Moves very slowly and garded when up. Very limited range of motion.                 "

## 2023-11-30 ENCOUNTER — MYC REFILL (OUTPATIENT)
Dept: INTERNAL MEDICINE | Facility: CLINIC | Age: 58
End: 2023-11-30
Payer: COMMERCIAL

## 2023-11-30 DIAGNOSIS — G47.00 INSOMNIA, UNSPECIFIED TYPE: ICD-10-CM

## 2023-12-04 RX ORDER — ZOLPIDEM TARTRATE 5 MG/1
5 TABLET ORAL
Qty: 30 TABLET | Refills: 5 | Status: SHIPPED | OUTPATIENT
Start: 2023-12-04 | End: 2024-06-10

## 2024-02-24 NOTE — TELEPHONE ENCOUNTER
Dialysis continues, trop sent to lab.    Routing refill request to provider for review/approval because:  PHQ-9 >5 and >6 months ago.    Please advise, thanks.

## 2024-06-10 ENCOUNTER — MYC REFILL (OUTPATIENT)
Dept: INTERNAL MEDICINE | Facility: CLINIC | Age: 59
End: 2024-06-10
Payer: COMMERCIAL

## 2024-06-10 DIAGNOSIS — E03.9 ACQUIRED HYPOTHYROIDISM: ICD-10-CM

## 2024-06-10 DIAGNOSIS — G47.00 INSOMNIA, UNSPECIFIED TYPE: ICD-10-CM

## 2024-06-10 DIAGNOSIS — I25.10 CORONARY ARTERY DISEASE INVOLVING NATIVE CORONARY ARTERY OF NATIVE HEART WITHOUT ANGINA PECTORIS: ICD-10-CM

## 2024-06-10 RX ORDER — ZOLPIDEM TARTRATE 5 MG/1
5 TABLET ORAL
Qty: 30 TABLET | Refills: 0 | Status: SHIPPED | OUTPATIENT
Start: 2024-06-10 | End: 2024-07-14

## 2024-06-12 ENCOUNTER — MYC REFILL (OUTPATIENT)
Dept: INTERNAL MEDICINE | Facility: CLINIC | Age: 59
End: 2024-06-12
Payer: COMMERCIAL

## 2024-06-12 DIAGNOSIS — E03.9 ACQUIRED HYPOTHYROIDISM: ICD-10-CM

## 2024-06-12 RX ORDER — NITROGLYCERIN 0.4 MG/1
0.4 TABLET SUBLINGUAL EVERY 5 MIN PRN
Qty: 25 TABLET | Refills: 0 | Status: SHIPPED | OUTPATIENT
Start: 2024-06-12

## 2024-06-12 RX ORDER — LEVOTHYROXINE SODIUM 25 UG/1
TABLET ORAL
Qty: 90 TABLET | Refills: 0 | Status: SHIPPED | OUTPATIENT
Start: 2024-06-12

## 2024-06-12 RX ORDER — LEVOTHYROXINE SODIUM 50 UG/1
TABLET ORAL
Qty: 90 TABLET | Refills: 0 | Status: SHIPPED | OUTPATIENT
Start: 2024-06-12 | End: 2024-09-04

## 2024-06-12 RX ORDER — LEVOTHYROXINE SODIUM 50 UG/1
TABLET ORAL
Qty: 90 TABLET | Refills: 0 | OUTPATIENT
Start: 2024-06-12

## 2024-06-12 RX ORDER — LEVOTHYROXINE SODIUM 25 UG/1
TABLET ORAL
Qty: 90 TABLET | Refills: 0 | OUTPATIENT
Start: 2024-06-12

## 2024-06-12 NOTE — TELEPHONE ENCOUNTER
This refill request is a duplicate request, previously received or sent.   Sent denial notification to pharmacy.  MEGA Núñez  [facility]

## 2024-07-02 DIAGNOSIS — F33.0 MILD EPISODE OF RECURRENT MAJOR DEPRESSIVE DISORDER (H): ICD-10-CM

## 2024-07-02 DIAGNOSIS — I25.10 CORONARY ARTERY DISEASE INVOLVING NATIVE CORONARY ARTERY OF NATIVE HEART WITHOUT ANGINA PECTORIS: ICD-10-CM

## 2024-07-02 RX ORDER — LISINOPRIL 5 MG/1
5 TABLET ORAL DAILY
Qty: 90 TABLET | Refills: 0 | Status: SHIPPED | OUTPATIENT
Start: 2024-07-02 | End: 2024-10-04

## 2024-07-02 RX ORDER — DESVENLAFAXINE 50 MG/1
50 TABLET, FILM COATED, EXTENDED RELEASE ORAL DAILY
Qty: 90 TABLET | Refills: 0 | Status: SHIPPED | OUTPATIENT
Start: 2024-07-02 | End: 2024-10-04

## 2024-07-06 ENCOUNTER — HEALTH MAINTENANCE LETTER (OUTPATIENT)
Age: 59
End: 2024-07-06

## 2024-07-12 ENCOUNTER — OFFICE VISIT (OUTPATIENT)
Dept: INTERNAL MEDICINE | Facility: CLINIC | Age: 59
End: 2024-07-12
Payer: COMMERCIAL

## 2024-07-12 VITALS
RESPIRATION RATE: 18 BRPM | SYSTOLIC BLOOD PRESSURE: 115 MMHG | DIASTOLIC BLOOD PRESSURE: 80 MMHG | HEART RATE: 83 BPM | TEMPERATURE: 97.5 F | HEIGHT: 63 IN | OXYGEN SATURATION: 99 % | WEIGHT: 134.8 LBS | BODY MASS INDEX: 23.88 KG/M2

## 2024-07-12 DIAGNOSIS — E78.5 HYPERLIPIDEMIA LDL GOAL <70: ICD-10-CM

## 2024-07-12 DIAGNOSIS — J30.9 ALLERGIC RHINITIS, UNSPECIFIED SEASONALITY, UNSPECIFIED TRIGGER: ICD-10-CM

## 2024-07-12 DIAGNOSIS — F33.0 MILD EPISODE OF RECURRENT MAJOR DEPRESSIVE DISORDER (H): ICD-10-CM

## 2024-07-12 DIAGNOSIS — E03.9 ACQUIRED HYPOTHYROIDISM: Primary | ICD-10-CM

## 2024-07-12 DIAGNOSIS — I25.10 CORONARY ARTERY DISEASE INVOLVING NATIVE CORONARY ARTERY OF NATIVE HEART WITHOUT ANGINA PECTORIS: ICD-10-CM

## 2024-07-12 DIAGNOSIS — G47.00 INSOMNIA, UNSPECIFIED TYPE: ICD-10-CM

## 2024-07-12 DIAGNOSIS — M81.0 AGE-RELATED OSTEOPOROSIS WITHOUT CURRENT PATHOLOGICAL FRACTURE: ICD-10-CM

## 2024-07-12 PROCEDURE — 84443 ASSAY THYROID STIM HORMONE: CPT | Performed by: INTERNAL MEDICINE

## 2024-07-12 PROCEDURE — G2211 COMPLEX E/M VISIT ADD ON: HCPCS | Performed by: INTERNAL MEDICINE

## 2024-07-12 PROCEDURE — 80061 LIPID PANEL: CPT | Performed by: INTERNAL MEDICINE

## 2024-07-12 PROCEDURE — 36415 COLL VENOUS BLD VENIPUNCTURE: CPT | Performed by: INTERNAL MEDICINE

## 2024-07-12 PROCEDURE — 99215 OFFICE O/P EST HI 40 MIN: CPT | Performed by: INTERNAL MEDICINE

## 2024-07-12 PROCEDURE — 80053 COMPREHEN METABOLIC PANEL: CPT | Performed by: INTERNAL MEDICINE

## 2024-07-12 RX ORDER — FLUTICASONE PROPIONATE 50 MCG
2 SPRAY, SUSPENSION (ML) NASAL DAILY
Qty: 16 ML | Refills: 11 | Status: SHIPPED | OUTPATIENT
Start: 2024-07-12 | End: 2024-08-08

## 2024-07-12 RX ORDER — ZOLPIDEM TARTRATE 5 MG/1
5 TABLET ORAL
Qty: 30 TABLET | Refills: 0 | Status: CANCELLED | OUTPATIENT
Start: 2024-07-12

## 2024-07-12 RX ORDER — AMLODIPINE BESYLATE 5 MG/1
5 TABLET ORAL DAILY
Qty: 90 TABLET | Refills: 3 | Status: SHIPPED | OUTPATIENT
Start: 2024-07-12

## 2024-07-12 RX ORDER — ROSUVASTATIN CALCIUM 40 MG/1
40 TABLET, COATED ORAL DAILY
Qty: 90 TABLET | Refills: 3 | Status: SHIPPED | OUTPATIENT
Start: 2024-07-12

## 2024-07-12 RX ORDER — CARVEDILOL 6.25 MG/1
6.25 TABLET ORAL 2 TIMES DAILY WITH MEALS
Qty: 180 TABLET | Refills: 3 | Status: SHIPPED | OUTPATIENT
Start: 2024-07-12

## 2024-07-12 ASSESSMENT — PATIENT HEALTH QUESTIONNAIRE - PHQ9
SUM OF ALL RESPONSES TO PHQ QUESTIONS 1-9: 5
SUM OF ALL RESPONSES TO PHQ QUESTIONS 1-9: 5
10. IF YOU CHECKED OFF ANY PROBLEMS, HOW DIFFICULT HAVE THESE PROBLEMS MADE IT FOR YOU TO DO YOUR WORK, TAKE CARE OF THINGS AT HOME, OR GET ALONG WITH OTHER PEOPLE: NOT DIFFICULT AT ALL

## 2024-07-12 ASSESSMENT — PAIN SCALES - GENERAL: PAINLEVEL: NO PAIN (0)

## 2024-07-12 NOTE — PROGRESS NOTES
Assessment & Plan     Acquired hypothyroidism  Clinically stable.  Continues on levothyroxine at an alternating dose between 50 mcg and 25 mcg.  Update TSH lab work.  - TSH WITH FREE T4 REFLEX; Future  - TSH WITH FREE T4 REFLEX    Coronary artery disease involving native coronary artery of native heart without angina pectoris  Symptoms stable.  Will be having a follow-up with cardiology in November.  Continue medications at the current dose including amlodipine and carvedilol.  Update electrolytes/creatinine check.  - amLODIPine (NORVASC) 5 MG tablet; Take 1 tablet (5 mg) by mouth daily  - carvedilol (COREG) 6.25 MG tablet; Take 1 tablet (6.25 mg) by mouth 2 times daily (with meals)  - rosuvastatin (CRESTOR) 40 MG tablet; Take 1 tablet (40 mg) by mouth daily  - Comprehensive metabolic panel; Future  - Comprehensive metabolic panel    Hyperlipidemia LDL goal <70  Continues on Crestor 40 mg daily.  Tolerating medication well.  Update lipid panel  - rosuvastatin (CRESTOR) 40 MG tablet; Take 1 tablet (40 mg) by mouth daily  - Lipid panel reflex to direct LDL Fasting; Future  - Lipid panel reflex to direct LDL Fasting    Insomnia, unspecified type  Has tried trazodone and melatonin.  Has been on Ambien for a while, tolerating medication well with no daytime dizziness or grogginess.  To continue with Ambien 5 mg as needed for sleep.    Age-related osteoporosis without current pathological fracture  To start fosamax medication. Medication was prescribed during last in office visit.  Patient advised on taking 1789-7354 mg of calcium with vit D 800 -1000 units daily and regular exercising.  Repeat DEXA in 2 years.    Mild episode of recurrent major depressive disorder (H24)  Overall, clinically stable. Continue pristiq at current dose.    Allergic rhinitis, unspecified seasonality, unspecified trigger  - fluticasone (FLONASE) 50 MCG/ACT nasal spray; Spray 2 sprays into both nostrils daily        41 minutes spent by me on  "the date of the encounter doing chart review, history and exam, documentation and further activities per the note    Subjective   Fallon is a 59 year old, presenting for the following health issues:  Establish Care        7/12/2024     8:44 AM   Additional Questions   Roomed by Betina HOLCOMB   Accompanied by      History of Present Illness       Reason for visit:  Annual Wellness    She eats 2-3 servings of fruits and vegetables daily.She consumes 2 sweetened beverage(s) daily.She exercises with enough effort to increase her heart rate 30 to 60 minutes per day.  She exercises with enough effort to increase her heart rate 3 or less days per week.   She is taking medications regularly.     Patient comes in today to establish care and follow-up on chronic conditions.  Overall, no new concerns.      Review of Systems  Constitutional, HEENT, cardiovascular, pulmonary, gi and gu systems are negative, except as otherwise noted.      Objective    /80 (BP Location: Right arm, Cuff Size: Adult Regular)   Pulse 83   Temp 97.5  F (36.4  C) (Tympanic)   Resp 18   Ht 1.6 m (5' 3\")   Wt 61.1 kg (134 lb 12.8 oz)   LMP  (LMP Unknown)   SpO2 99%   BMI 23.88 kg/m    Body mass index is 23.88 kg/m .  Physical Exam   GENERAL: alert and no distress  HENT: ear canals and TM's normal, nose and mouth without ulcers or lesions  RESP: lungs clear to auscultation - no rales, rhonchi or wheezes  CV: regular rate and rhythm, normal S1 S2  MS: no gross musculoskeletal defects noted, no edema  NEURO: Normal strength and tone, mentation intact and speech normal  PSYCH: mentation appears normal, affect normal.            Signed Electronically by: Zulma Leon MD    "

## 2024-07-12 NOTE — PATIENT INSTRUCTIONS
The bone density shows some  Osteoporosis.  You should be taking 1188-8451 mg of calcium with vit D.  You should be exercising regularly.

## 2024-07-13 LAB
ALBUMIN SERPL BCG-MCNC: 4.6 G/DL (ref 3.5–5.2)
ALP SERPL-CCNC: 64 U/L (ref 40–150)
ALT SERPL W P-5'-P-CCNC: 32 U/L (ref 0–50)
ANION GAP SERPL CALCULATED.3IONS-SCNC: 10 MMOL/L (ref 7–15)
AST SERPL W P-5'-P-CCNC: 24 U/L (ref 0–45)
BILIRUB SERPL-MCNC: 1.2 MG/DL
BUN SERPL-MCNC: 11.4 MG/DL (ref 8–23)
CALCIUM SERPL-MCNC: 9.3 MG/DL (ref 8.8–10.2)
CHLORIDE SERPL-SCNC: 106 MMOL/L (ref 98–107)
CHOLEST SERPL-MCNC: 143 MG/DL
CREAT SERPL-MCNC: 0.74 MG/DL (ref 0.51–0.95)
DEPRECATED HCO3 PLAS-SCNC: 23 MMOL/L (ref 22–29)
EGFRCR SERPLBLD CKD-EPI 2021: >90 ML/MIN/1.73M2
FASTING STATUS PATIENT QL REPORTED: YES
FASTING STATUS PATIENT QL REPORTED: YES
GLUCOSE SERPL-MCNC: 134 MG/DL (ref 70–99)
HDLC SERPL-MCNC: 50 MG/DL
LDLC SERPL CALC-MCNC: 68 MG/DL
NONHDLC SERPL-MCNC: 93 MG/DL
POTASSIUM SERPL-SCNC: 4.5 MMOL/L (ref 3.4–5.3)
PROT SERPL-MCNC: 7.1 G/DL (ref 6.4–8.3)
SODIUM SERPL-SCNC: 139 MMOL/L (ref 135–145)
TRIGL SERPL-MCNC: 124 MG/DL
TSH SERPL DL<=0.005 MIU/L-ACNC: 1.85 UIU/ML (ref 0.3–4.2)

## 2024-07-14 ENCOUNTER — MYC REFILL (OUTPATIENT)
Dept: INTERNAL MEDICINE | Facility: CLINIC | Age: 59
End: 2024-07-14
Payer: COMMERCIAL

## 2024-07-14 DIAGNOSIS — G47.00 INSOMNIA, UNSPECIFIED TYPE: ICD-10-CM

## 2024-07-16 RX ORDER — ZOLPIDEM TARTRATE 5 MG/1
5 TABLET ORAL
Qty: 30 TABLET | Refills: 0 | Status: SHIPPED | OUTPATIENT
Start: 2024-07-16 | End: 2024-08-13

## 2024-08-08 DIAGNOSIS — J30.9 ALLERGIC RHINITIS, UNSPECIFIED SEASONALITY, UNSPECIFIED TRIGGER: ICD-10-CM

## 2024-08-08 RX ORDER — FLUTICASONE PROPIONATE 50 MCG
2 SPRAY, SUSPENSION (ML) NASAL DAILY
Qty: 48 ML | Refills: 2 | Status: SHIPPED | OUTPATIENT
Start: 2024-08-08

## 2024-08-12 DIAGNOSIS — G47.00 INSOMNIA, UNSPECIFIED TYPE: ICD-10-CM

## 2024-08-13 RX ORDER — ZOLPIDEM TARTRATE 5 MG/1
5 TABLET ORAL
Qty: 30 TABLET | Refills: 0 | Status: SHIPPED | OUTPATIENT
Start: 2024-08-13

## 2024-08-20 ENCOUNTER — TELEPHONE (OUTPATIENT)
Dept: INTERNAL MEDICINE | Facility: CLINIC | Age: 59
End: 2024-08-20
Payer: COMMERCIAL

## 2024-08-20 NOTE — LETTER
August 20, 2024    To  Fallon Pizano  [address]  [facility] [address]    Your team at [facility] cares about your health. We have reviewed your chart and based on our findings; we are making the following recommendations to better manage your health.     You are in particular need of attention regarding the following:     PREVENTATIVE VISIT: Physical    If you have already completed these items, please contact the clinic via phone or   MyChart so your care team can review and update your records. Thank you for   choosing [facility] for your healthcare needs. For any questions,   concerns, or to schedule an appointment please contact our clinic.    Healthy Regards,      Your [facility] Care Team

## 2024-08-20 NOTE — TELEPHONE ENCOUNTER
Patient Quality Outreach    Patient is due for the following:   Physical Preventive Adult Physical    Next Steps:   Schedule a Adult Preventative    Type of outreach:    Sent letter.      Questions for provider review:    None           Melanie Mcdaniels MA  Chart routed to none.

## 2024-09-04 DIAGNOSIS — E03.9 ACQUIRED HYPOTHYROIDISM: ICD-10-CM

## 2024-09-04 RX ORDER — LEVOTHYROXINE SODIUM 50 UG/1
TABLET ORAL
Qty: 45 TABLET | Refills: 1 | Status: SHIPPED | OUTPATIENT
Start: 2024-09-04

## 2024-10-04 DIAGNOSIS — I25.10 CORONARY ARTERY DISEASE INVOLVING NATIVE CORONARY ARTERY OF NATIVE HEART WITHOUT ANGINA PECTORIS: ICD-10-CM

## 2024-10-04 DIAGNOSIS — F33.0 MILD EPISODE OF RECURRENT MAJOR DEPRESSIVE DISORDER (H): ICD-10-CM

## 2024-10-04 RX ORDER — LISINOPRIL 5 MG/1
5 TABLET ORAL DAILY
Qty: 90 TABLET | Refills: 2 | Status: SHIPPED | OUTPATIENT
Start: 2024-10-04

## 2024-10-04 RX ORDER — DESVENLAFAXINE 50 MG/1
50 TABLET, FILM COATED, EXTENDED RELEASE ORAL DAILY
Qty: 90 TABLET | Refills: 2 | Status: SHIPPED | OUTPATIENT
Start: 2024-10-04

## 2024-10-05 DIAGNOSIS — G47.00 INSOMNIA, UNSPECIFIED TYPE: ICD-10-CM

## 2024-10-08 RX ORDER — ZOLPIDEM TARTRATE 5 MG/1
5 TABLET ORAL
Qty: 30 TABLET | Refills: 0 | Status: SHIPPED | OUTPATIENT
Start: 2024-10-08 | End: 2024-11-12

## 2024-11-06 ENCOUNTER — OFFICE VISIT (OUTPATIENT)
Dept: CARDIOLOGY | Facility: CLINIC | Age: 59
End: 2024-11-06
Payer: COMMERCIAL

## 2024-11-06 VITALS
SYSTOLIC BLOOD PRESSURE: 112 MMHG | DIASTOLIC BLOOD PRESSURE: 80 MMHG | WEIGHT: 136.1 LBS | BODY MASS INDEX: 24.11 KG/M2 | HEART RATE: 61 BPM | HEIGHT: 63 IN | OXYGEN SATURATION: 98 %

## 2024-11-06 DIAGNOSIS — I25.10 CORONARY ARTERY DISEASE INVOLVING NATIVE CORONARY ARTERY OF NATIVE HEART WITHOUT ANGINA PECTORIS: Primary | ICD-10-CM

## 2024-11-06 PROCEDURE — 99214 OFFICE O/P EST MOD 30 MIN: CPT | Performed by: INTERNAL MEDICINE

## 2024-11-06 PROCEDURE — G2211 COMPLEX E/M VISIT ADD ON: HCPCS | Performed by: INTERNAL MEDICINE

## 2024-11-06 NOTE — LETTER
11/6/2024    Zulma Leon MD  [address] [facility] [address]    RE: Fallon Yasmeen Pizano       Dear Colleague,     I had the pleasure of seeing Fallon Yasmeen Pizano in the [facility].  Cardiology Progress Note          Assessment and Plan:       History of coronary artery disease with PCI to proximal LAD 2011, stable symptoms  Continue current cardiovascular regimen      Fatigue  Check exertional heart rates with her pulse oximeter and if they fail to get over 120 bpm, consider trial of being off her carvedilol to see if improvement in her fatigue.    Routine follow-up in 1 year    30 minutes was spent with the patient, precharting and reviewing tests as well as post visit charting all done today..    This note was transcribed using electronic voice recognition software and there may be typographical errors present.     The longitudinal plan of care for the diagnosis(es)/condition(s) as documented were addressed during this visit. Due to the added complexity in care, I will continue to support Fallon in the subsequent management and with ongoing continuity of care.                    Interval History:     The patient is a very pleasant 59 year old whom I have been following for premature coronary artery disease with history of PCI to the LAD 2011.  She is accompanied by her significant other.    She states she is doing well.  She has a little bit of fatigue.  No exertional chest discomfort or dyspnea on exertion.    Blood pressure and LDL under good control.    Heart rate is borderline bradycardic and she states that when she walks her heart rate does not get up very high.                     Review of Systems:     Review of Systems:  Skin:        Eyes:       ENT:       Respiratory:       Cardiovascular:       Gastroenterology:      Genitourinary:       Musculoskeletal:       Neurologic:       Psychiatric:       Heme/Lymph/Imm:       Endocrine:                    "Physical Exam:     Vitals: /80 (BP Location: Right arm, Patient Position: Sitting, Cuff Size: Adult Regular)   Pulse 61   Ht 1.6 m (5' 3\")   Wt 61.7 kg (136 lb 1.6 oz)   LMP  (LMP Unknown)   SpO2 98%   BMI 24.11 kg/m    Constitutional:  cooperative, alert and oriented, well developed, well nourished, in no acute distress        Skin:  warm and dry to the touch;no apparent skin lesions or masses noted        Head:  normocephalic, no masses or lesions        Eyes:  pupils equal and round, conjunctivae and lids unremarkable, sclera white, no xanthalasma, EOMS intact, no nystagmus        Chest:  normal symmetry        Cardiac: regular rhythm;normal S1 and S2                  Extremities and Back:  no edema;no deformities, clubbing, cyanosis, erythema observed        Neurological:  affect appropriate;no gross motor deficits                 Medications:     Current Outpatient Medications   Medication Sig Dispense Refill     alendronate (FOSAMAX) 70 MG tablet Take 1 tablet (70 mg) by mouth every 7 days 13 tablet 3     amLODIPine (NORVASC) 5 MG tablet Take 1 tablet (5 mg) by mouth daily 90 tablet 3     aspirin (ASA) 81 MG tablet Take 1 tablet (81 mg) by mouth daily 90 tablet 3     carvedilol (COREG) 6.25 MG tablet Take 1 tablet (6.25 mg) by mouth 2 times daily (with meals) 180 tablet 3     coenzyme Q-10 200 MG CAPS        desvenlafaxine (PRISTIQ) 50 MG 24 hr tablet Take 1 tablet (50 mg) by mouth daily. 90 tablet 2     fluticasone (FLONASE) 50 MCG/ACT nasal spray INSTILL 2 SPRAYS INTO BOTH NOSTRILS DAILY 48 mL 2     levothyroxine (SYNTHROID/LEVOTHROID) 25 MCG tablet TAKE 1 TABLET BY MOUTH EVERY OTHER DAY ALTERNATING WITH 50 MCG TABLETS 90 tablet 0     levothyroxine (SYNTHROID/LEVOTHROID) 50 MCG tablet TAKE 1 TABLET BY MOUTH EVERY OTHER DAY ALTERNATING WITH 25 MCG 45 tablet 1     lisinopril (ZESTRIL) 5 MG tablet Take 1 tablet (5 mg) by mouth daily. 90 tablet 2     nitroGLYcerin (NITROSTAT) 0.4 MG sublingual tablet " Place 1 tablet (0.4 mg) under the tongue every 5 minutes as needed for chest pain For chest pain place 1 tablet under the tongue every 5 minutes for 3 doses. If symptoms persist 5 minutes after 1st dose call 911. 25 tablet 0     rosuvastatin (CRESTOR) 40 MG tablet Take 1 tablet (40 mg) by mouth daily 90 tablet 3     zolpidem (AMBIEN) 5 MG tablet TAKE 1 TABLET (5 MG) BY MOUTH NIGHTLY AS NEEDED FOR SLEEP 30 tablet 0                Data:   All laboratory data reviewed  No results found for this or any previous visit (from the past 24 hours).    All laboratory data reviewed  Lab Results   Component Value Date    CHOL 143 07/12/2024    CHOL 150 04/08/2021     Lab Results   Component Value Date    HDL 50 07/12/2024    HDL 50 04/08/2021     Lab Results   Component Value Date    LDL 68 07/12/2024    LDL 71 04/08/2021     Lab Results   Component Value Date    TRIG 124 07/12/2024    TRIG 143 04/08/2021     Lab Results   Component Value Date    CHOLHDLRATIO 3.0 06/24/2015     TSH   Date Value Ref Range Status   07/12/2024 1.85 0.30 - 4.20 uIU/mL Final   03/22/2022 1.97 0.40 - 4.00 mU/L Final   04/08/2021 1.37 0.40 - 4.00 mU/L Final     Last Basic Metabolic Panel:  Lab Results   Component Value Date     07/12/2024     04/08/2021      Lab Results   Component Value Date    POTASSIUM 4.5 07/12/2024    POTASSIUM 4.4 03/22/2022    POTASSIUM 4.5 04/08/2021     Lab Results   Component Value Date    CHLORIDE 106 07/12/2024    CHLORIDE 111 03/22/2022    CHLORIDE 111 04/08/2021     Lab Results   Component Value Date    SHANTELL 9.3 07/12/2024    SHANTELL 9.1 04/08/2021     Lab Results   Component Value Date    CO2 23 07/12/2024    CO2 25 03/22/2022    CO2 29 04/08/2021     Lab Results   Component Value Date    BUN 11.4 07/12/2024    BUN 11 03/22/2022    BUN 11 04/08/2021     Lab Results   Component Value Date    CR 0.74 07/12/2024    CR 0.78 04/08/2021     Lab Results   Component Value Date     07/12/2024     03/22/2022      04/08/2021     Lab Results   Component Value Date    WBC 4.9 03/09/2020     Lab Results   Component Value Date    RBC 5.04 03/09/2020     Lab Results   Component Value Date    HGB 15.1 03/09/2020     Lab Results   Component Value Date    HCT 47.0 03/09/2020     Lab Results   Component Value Date    MCV 93 03/09/2020     Lab Results   Component Value Date    MCH 30.0 03/09/2020     Lab Results   Component Value Date    MCHC 32.1 03/09/2020     Lab Results   Component Value Date    RDW 13.2 03/09/2020     Lab Results   Component Value Date     03/09/2020             Thank you for allowing me to participate in the care of your patient.      Sincerely,     Babatunde Hernandez MD     [facility]  cc:   Babatunde Hernandez [place]  [id number] [address] [address]

## 2024-11-06 NOTE — PROGRESS NOTES
"Cardiology Progress Note          Assessment and Plan:       History of coronary artery disease with PCI to proximal LAD 2011, stable symptoms  Continue current cardiovascular regimen      Fatigue  Check exertional heart rates with her pulse oximeter and if they fail to get over 120 bpm, consider trial of being off her carvedilol to see if improvement in her fatigue.    Routine follow-up in 1 year    30 minutes was spent with the patient, precharting and reviewing tests as well as post visit charting all done today..    This note was transcribed using electronic voice recognition software and there may be typographical errors present.     The longitudinal plan of care for the diagnosis(es)/condition(s) as documented were addressed during this visit. Due to the added complexity in care, I will continue to support Fallon in the subsequent management and with ongoing continuity of care.                    Interval History:     The patient is a very pleasant 59 year old whom I have been following for premature coronary artery disease with history of PCI to the LAD 2011.  She is accompanied by her significant other.    She states she is doing well.  She has a little bit of fatigue.  No exertional chest discomfort or dyspnea on exertion.    Blood pressure and LDL under good control.    Heart rate is borderline bradycardic and she states that when she walks her heart rate does not get up very high.                     Review of Systems:     Review of Systems:  Skin:        Eyes:       ENT:       Respiratory:       Cardiovascular:       Gastroenterology:      Genitourinary:       Musculoskeletal:       Neurologic:       Psychiatric:       Heme/Lymph/Imm:       Endocrine:                   Physical Exam:     Vitals: /80 (BP Location: Right arm, Patient Position: Sitting, Cuff Size: Adult Regular)   Pulse 61   Ht 1.6 m (5' 3\")   Wt 61.7 kg (136 lb 1.6 oz)   LMP  (LMP Unknown)   SpO2 98%   BMI 24.11 kg/m  "   Constitutional:  cooperative, alert and oriented, well developed, well nourished, in no acute distress        Skin:  warm and dry to the touch;no apparent skin lesions or masses noted        Head:  normocephalic, no masses or lesions        Eyes:  pupils equal and round, conjunctivae and lids unremarkable, sclera white, no xanthalasma, EOMS intact, no nystagmus        Chest:  normal symmetry        Cardiac: regular rhythm;normal S1 and S2                  Extremities and Back:  no edema;no deformities, clubbing, cyanosis, erythema observed        Neurological:  affect appropriate;no gross motor deficits                 Medications:     Current Outpatient Medications   Medication Sig Dispense Refill    alendronate (FOSAMAX) 70 MG tablet Take 1 tablet (70 mg) by mouth every 7 days 13 tablet 3    amLODIPine (NORVASC) 5 MG tablet Take 1 tablet (5 mg) by mouth daily 90 tablet 3    aspirin (ASA) 81 MG tablet Take 1 tablet (81 mg) by mouth daily 90 tablet 3    carvedilol (COREG) 6.25 MG tablet Take 1 tablet (6.25 mg) by mouth 2 times daily (with meals) 180 tablet 3    coenzyme Q-10 200 MG CAPS       desvenlafaxine (PRISTIQ) 50 MG 24 hr tablet Take 1 tablet (50 mg) by mouth daily. 90 tablet 2    fluticasone (FLONASE) 50 MCG/ACT nasal spray INSTILL 2 SPRAYS INTO BOTH NOSTRILS DAILY 48 mL 2    levothyroxine (SYNTHROID/LEVOTHROID) 25 MCG tablet TAKE 1 TABLET BY MOUTH EVERY OTHER DAY ALTERNATING WITH 50 MCG TABLETS 90 tablet 0    levothyroxine (SYNTHROID/LEVOTHROID) 50 MCG tablet TAKE 1 TABLET BY MOUTH EVERY OTHER DAY ALTERNATING WITH 25 MCG 45 tablet 1    lisinopril (ZESTRIL) 5 MG tablet Take 1 tablet (5 mg) by mouth daily. 90 tablet 2    nitroGLYcerin (NITROSTAT) 0.4 MG sublingual tablet Place 1 tablet (0.4 mg) under the tongue every 5 minutes as needed for chest pain For chest pain place 1 tablet under the tongue every 5 minutes for 3 doses. If symptoms persist 5 minutes after 1st dose call 911. 25 tablet 0    rosuvastatin  (CRESTOR) 40 MG tablet Take 1 tablet (40 mg) by mouth daily 90 tablet 3    zolpidem (AMBIEN) 5 MG tablet TAKE 1 TABLET (5 MG) BY MOUTH NIGHTLY AS NEEDED FOR SLEEP 30 tablet 0                Data:   All laboratory data reviewed  No results found for this or any previous visit (from the past 24 hours).    All laboratory data reviewed  Lab Results   Component Value Date    CHOL 143 07/12/2024    CHOL 150 04/08/2021     Lab Results   Component Value Date    HDL 50 07/12/2024    HDL 50 04/08/2021     Lab Results   Component Value Date    LDL 68 07/12/2024    LDL 71 04/08/2021     Lab Results   Component Value Date    TRIG 124 07/12/2024    TRIG 143 04/08/2021     Lab Results   Component Value Date    CHOLHDLRATIO 3.0 06/24/2015     TSH   Date Value Ref Range Status   07/12/2024 1.85 0.30 - 4.20 uIU/mL Final   03/22/2022 1.97 0.40 - 4.00 mU/L Final   04/08/2021 1.37 0.40 - 4.00 mU/L Final     Last Basic Metabolic Panel:  Lab Results   Component Value Date     07/12/2024     04/08/2021      Lab Results   Component Value Date    POTASSIUM 4.5 07/12/2024    POTASSIUM 4.4 03/22/2022    POTASSIUM 4.5 04/08/2021     Lab Results   Component Value Date    CHLORIDE 106 07/12/2024    CHLORIDE 111 03/22/2022    CHLORIDE 111 04/08/2021     Lab Results   Component Value Date    SHANTELL 9.3 07/12/2024    SHANTELL 9.1 04/08/2021     Lab Results   Component Value Date    CO2 23 07/12/2024    CO2 25 03/22/2022    CO2 29 04/08/2021     Lab Results   Component Value Date    BUN 11.4 07/12/2024    BUN 11 03/22/2022    BUN 11 04/08/2021     Lab Results   Component Value Date    CR 0.74 07/12/2024    CR 0.78 04/08/2021     Lab Results   Component Value Date     07/12/2024     03/22/2022     04/08/2021     Lab Results   Component Value Date    WBC 4.9 03/09/2020     Lab Results   Component Value Date    RBC 5.04 03/09/2020     Lab Results   Component Value Date    HGB 15.1 03/09/2020     Lab Results   Component Value  Date    HCT 47.0 03/09/2020     Lab Results   Component Value Date    MCV 93 03/09/2020     Lab Results   Component Value Date    MCH 30.0 03/09/2020     Lab Results   Component Value Date    MCHC 32.1 03/09/2020     Lab Results   Component Value Date    RDW 13.2 03/09/2020     Lab Results   Component Value Date     03/09/2020

## 2024-11-06 NOTE — PATIENT INSTRUCTIONS
Some people on the carvedilol feel tired when they walk around because the heart rates don't get up. Check your heart rates when you're walking and see if they get above 120.     If it doesn't, get above 120 you might try stopping the carvedilol for a week and see if you feel peppier.

## 2024-11-09 SDOH — HEALTH STABILITY: PHYSICAL HEALTH: ON AVERAGE, HOW MANY MINUTES DO YOU ENGAGE IN EXERCISE AT THIS LEVEL?: 30 MIN

## 2024-11-09 SDOH — HEALTH STABILITY: PHYSICAL HEALTH: ON AVERAGE, HOW MANY DAYS PER WEEK DO YOU ENGAGE IN MODERATE TO STRENUOUS EXERCISE (LIKE A BRISK WALK)?: 2 DAYS

## 2024-11-09 ASSESSMENT — SOCIAL DETERMINANTS OF HEALTH (SDOH): HOW OFTEN DO YOU GET TOGETHER WITH FRIENDS OR RELATIVES?: ONCE A WEEK

## 2024-11-12 ENCOUNTER — OFFICE VISIT (OUTPATIENT)
Dept: INTERNAL MEDICINE | Facility: CLINIC | Age: 59
End: 2024-11-12
Payer: COMMERCIAL

## 2024-11-12 VITALS
HEART RATE: 89 BPM | DIASTOLIC BLOOD PRESSURE: 78 MMHG | TEMPERATURE: 97.2 F | OXYGEN SATURATION: 95 % | SYSTOLIC BLOOD PRESSURE: 114 MMHG | RESPIRATION RATE: 18 BRPM | WEIGHT: 134.6 LBS | BODY MASS INDEX: 23.85 KG/M2 | HEIGHT: 63 IN

## 2024-11-12 DIAGNOSIS — G47.00 INSOMNIA, UNSPECIFIED TYPE: ICD-10-CM

## 2024-11-12 DIAGNOSIS — Z00.00 ROUTINE GENERAL MEDICAL EXAMINATION AT A HEALTH CARE FACILITY: Primary | ICD-10-CM

## 2024-11-12 DIAGNOSIS — M54.12 CERVICAL RADICULOPATHY: ICD-10-CM

## 2024-11-12 DIAGNOSIS — I25.10 CORONARY ARTERY DISEASE INVOLVING NATIVE CORONARY ARTERY OF NATIVE HEART WITHOUT ANGINA PECTORIS: ICD-10-CM

## 2024-11-12 DIAGNOSIS — Z12.31 ENCOUNTER FOR SCREENING MAMMOGRAM FOR BREAST CANCER: ICD-10-CM

## 2024-11-12 DIAGNOSIS — F33.0 MILD EPISODE OF RECURRENT MAJOR DEPRESSIVE DISORDER (H): ICD-10-CM

## 2024-11-12 PROCEDURE — 99396 PREV VISIT EST AGE 40-64: CPT | Performed by: INTERNAL MEDICINE

## 2024-11-12 PROCEDURE — 99214 OFFICE O/P EST MOD 30 MIN: CPT | Mod: 25 | Performed by: INTERNAL MEDICINE

## 2024-11-12 RX ORDER — ZOLPIDEM TARTRATE 5 MG/1
5 TABLET ORAL
Qty: 30 TABLET | Refills: 0 | Status: SHIPPED | OUTPATIENT
Start: 2024-11-12

## 2024-11-12 RX ORDER — NITROGLYCERIN 0.4 MG/1
0.4 TABLET SUBLINGUAL EVERY 5 MIN PRN
Qty: 25 TABLET | Refills: 0 | Status: SHIPPED | OUTPATIENT
Start: 2024-11-12

## 2024-11-12 NOTE — PROGRESS NOTES
Preventive Care Visit  [facility]  Zulma Leon MD, Internal Medicine  Nov 12, 2024      Assessment & Plan     Routine general medical examination at a health care facility  Patient declined vaccinations in office today.  Up-to-date with colonoscopy and Pap smear.  Will be completing mammogram.    Cervical radiculopathy  Status post C4-7 surgical procedure with Dr. Arriaza.  Has noted recurring right-sided cervical radiculopathy symptoms with occasional right arm pain.  Would like to proceed with referral back to the neuro spine team.  Would like to hold off on physical therapy for now until visit with the spine team.  - Spine  Referral; Future    Insomnia, unspecified type  - zolpidem (AMBIEN) 5 MG tablet; Take 1 tablet (5 mg) by mouth nightly as needed for sleep.    Coronary artery disease involving native coronary artery of native heart without angina pectoris  - nitroGLYcerin (NITROSTAT) 0.4 MG sublingual tablet; Place 1 tablet (0.4 mg) under the tongue every 5 minutes as needed for chest pain. For chest pain place 1 tablet under the tongue every 5 minutes for 3 doses. If symptoms persist 5 minutes after 1st dose call 911.    Mild episode of recurrent major depressive disorder (H)  Overall, symptoms have been stable.  Continue desvenlafaxine at the current dose.    Encounter for screening mammogram for breast cancer  - MA Screen with Implants Bilateral w/[facility]; Future    Patient has been advised of split billing requirements and indicates understanding: Yes        Counseling  Appropriate preventive services were discussed with this patient, including applicable screening as appropriate for nutrition, physical activity            Subjective   Fallon is a 59 year old, presenting for the following:  Physical        11/12/2024     8:43 AM   Additional Questions   Roomed by James   Accompanied by            HPI      Hyperlipidemia Follow-Up    Are you regularly taking any  medication or supplement to lower your cholesterol?   Yes- Crestor   Are you having muscle aches or other side effects that you think could be caused by your cholesterol lowering medication?  No    Health Care Directive  Patient does not have a Health Care Directive: Discussed advance care planning with patient; however, patient declined at this time.      11/9/2024   General Health   How would you rate your overall physical health? Good   Feel stress (tense, anxious, or unable to sleep) To some extent      (!) STRESS CONCERN      11/9/2024   Nutrition   Three or more servings of calcium each day? Yes   Diet: Low salt    Low fat/cholesterol   How many servings of fruit and vegetables per day? (!) 0-1   How many sweetened beverages each day? 0-1       Multiple values from one day are sorted in reverse-chronological order         11/9/2024   Exercise   Days per week of moderate/strenous exercise 2 days   Average minutes spent exercising at this level 30 min      (!) EXERCISE CONCERN      11/9/2024   Social Factors   Frequency of gathering with friends or relatives Once a week   Worry food won't last until get money to buy more No   Food not last or not have enough money for food? No   Do you have housing? (Housing is defined as stable permanent housing and does not include staying ouside in a car, in a tent, in an abandoned building, in an overnight shelter, or couch-surfing.) Yes   Are you worried about losing your housing? No   Lack of transportation? No   Unable to get utilities (heat,electricity)? No            11/9/2024   Fall Risk   Fallen 2 or more times in the past year? No    Trouble with walking or balance? No        Patient-reported          11/9/2024   Dental   Dentist two times every year? (!) NO            11/9/2024   TB Screening   Were you born outside of the [place]? No            Today's PHQ-9 Score:       7/12/2024     8:34 AM   PHQ-9 SCORE   PHQ-9 Total Score MyChart 5 (Mild depression)   PHQ-9 Total  Score 5           2024   Substance Use   Alcohol more than 3/day or more than 7/wk No   Do you use any other substances recreationally? No        Social History     Tobacco Use    Smoking status: Former     Current packs/day: 0.00     Average packs/day: 0.3 packs/day for 26.0 years (6.5 ttl pk-yrs)     Types: Cigarettes     Start date: 1994     Quit date: 3/10/2020     Years since quittin.6     Passive exposure: Past    Smokeless tobacco: Never    Tobacco comments:     smokes occ   Vaping Use    Vaping status: Never Used   Substance Use Topics    Alcohol use: Yes     Alcohol/week: 0.0 standard drinks of alcohol     Comment: Casual    Drug use: No           2023   LAST FHS-7 RESULTS   1st degree relative breast or ovarian cancer No   Any relative bilateral breast cancer No   Any male have breast cancer No   Any ONE woman have BOTH breast AND ovarian cancer No   Any woman with breast cancer before 50yrs No   2 or more relatives with breast AND/OR ovarian cancer No   2 or more relatives with breast AND/OR bowel cancer No           Mammogram Screening - Mammogram every 1-2 years updated in Health Maintenance based on mutual decision making        2024   STI Screening   New sexual partner(s) since last STI/HIV test? No        History of abnormal Pap smear: No - age 30- 64 PAP with HPV every 5 years recommended        Latest Ref Rng & Units 3/22/2022    10:38 AM 2017     9:20 AM 2017     8:48 AM   PAP / HPV   PAP  Negative for Intraepithelial Lesion or Malignancy (NILM)      PAP (Historical)    NIL    HPV 16 DNA Negative Negative  Negative     HPV 18 DNA Negative Negative  Negative     Other HR HPV Negative Negative  Negative       ASCVD Risk   The 10-year ASCVD risk score (Calderon GARCIA, et al., 2019) is: 2.2%    Values used to calculate the score:      Age: 59 years      Sex: Female      Is Non- : No      Diabetic: No      Tobacco smoker: No      Systolic Blood  "Pressure: 114 mmHg      Is BP treated: No      HDL Cholesterol: 50 mg/dL      Total Cholesterol: 143 mg/dL           Reviewed and updated as needed this visit by Provider                          Review of Systems  Constitutional, HEENT, cardiovascular, pulmonary, gi and gu systems are negative, except as otherwise noted.     Objective    Exam  /78 (BP Location: Right arm, Patient Position: Sitting, Cuff Size: Adult Regular)   Pulse 89   Temp 97.2  F (36.2  C) (Tympanic)   Resp 18   Ht 1.6 m (5' 3\")   Wt 61.1 kg (134 lb 9.6 oz)   LMP  (LMP Unknown)   SpO2 95%   BMI 23.84 kg/m     Estimated body mass index is 23.84 kg/m  as calculated from the following:    Height as of this encounter: 1.6 m (5' 3\").    Weight as of this encounter: 61.1 kg (134 lb 9.6 oz).    Physical Exam  GENERAL: alert and no distress  EYES: Eyes grossly normal to inspection, PERRL and conjunctivae and sclerae normal  HENT: ear canals and TM's normal, nose and mouth without ulcers or lesions  RESP: lungs clear to auscultation - no rales, rhonchi or wheezes  BREAST: normal without masses, tenderness or nipple discharge and no palpable axillary masses or adenopathy  CV: regular rate and rhythm, normal S1 S2  ABDOMEN: soft, nontender, no hepatosplenomegaly, no masses   MS: no gross musculoskeletal defects noted, no edema  NEURO: Normal strength and tone, mentation intact and speech normal  PSYCH: mentation appears normal, affect normal.        Signed Electronically by: Zulma Leon MD    "

## 2024-11-13 ENCOUNTER — PATIENT OUTREACH (OUTPATIENT)
Dept: CARE COORDINATION | Facility: CLINIC | Age: 59
End: 2024-11-13
Payer: COMMERCIAL

## 2024-11-13 NOTE — CONFIDENTIAL NOTE
NEUROSURGERY - NEW PREVISIT PLANNING    Referring Provider: Zulma Leon MD    OVN 11/12/2024   Reason For Visit: M54.12 (ICD-10-CM) - Cervical radiculopathy   Additional Information:  recurring cervical radiculopathy symptoms       IMAGING STATUS/LOCATION DATE/TYPE   MRI PACS 10/25/2019  Cervical  [facility]   CT N/A    XRAY PACS 03/29/2021  Cervical  [facility]   NOTES STATUS/LOCATION DATE/TYPE   Other specialist OVN: N/A    EMG N/A    INJECTION Encounters 11/13/2019, C7-T1 [facility]   PHYSICAL THERAPY Encounters 2019, 2020   SURGERY Encounters 03/24/2020, Cervical 4 to Cervical 7 Anterior Cervical Discectomy and Fusion ([facility])

## 2024-11-18 ENCOUNTER — PRE VISIT (OUTPATIENT)
Dept: NEUROSURGERY | Facility: CLINIC | Age: 59
End: 2024-11-18

## 2024-11-18 ENCOUNTER — OFFICE VISIT (OUTPATIENT)
Dept: NEUROSURGERY | Facility: CLINIC | Age: 59
End: 2024-11-18
Attending: INTERNAL MEDICINE
Payer: COMMERCIAL

## 2024-11-18 ENCOUNTER — ANCILLARY PROCEDURE (OUTPATIENT)
Dept: GENERAL RADIOLOGY | Facility: CLINIC | Age: 59
End: 2024-11-18
Attending: NURSE PRACTITIONER
Payer: COMMERCIAL

## 2024-11-18 VITALS
WEIGHT: 136 LBS | RESPIRATION RATE: 14 BRPM | SYSTOLIC BLOOD PRESSURE: 122 MMHG | TEMPERATURE: 96.5 F | HEART RATE: 62 BPM | BODY MASS INDEX: 24.09 KG/M2 | DIASTOLIC BLOOD PRESSURE: 78 MMHG

## 2024-11-18 DIAGNOSIS — R29.898 RIGHT HAND WEAKNESS: Primary | ICD-10-CM

## 2024-11-18 DIAGNOSIS — M54.12 CERVICAL RADICULOPATHY: ICD-10-CM

## 2024-11-18 PROCEDURE — 99243 OFF/OP CNSLTJ NEW/EST LOW 30: CPT | Performed by: NURSE PRACTITIONER

## 2024-11-18 ASSESSMENT — PAIN SCALES - GENERAL: PAINLEVEL_OUTOF10: SEVERE PAIN (6)

## 2024-11-18 NOTE — NURSING NOTE
"Fallon Pizano is a 59 year old female who presents for:  Chief Complaint   Patient presents with    Neurologic Problem     Cervical radiculopathy into the right arm/ right hand         Initial Vitals:  /78   Pulse 62   Temp (!) 96.5  F (35.8  C) (Temporal)   Resp 14   Wt 136 lb (61.7 kg)   LMP  (LMP Unknown)   BMI 24.09 kg/m   Estimated body mass index is 24.09 kg/m  as calculated from the following:    Height as of 11/12/24: 5' 3\" (1.6 m).    Weight as of this encounter: 136 lb (61.7 kg).. Body surface area is 1.66 meters squared. BP completed using cuff size: regular  Severe Pain (6)    Nursing Comments:     Raegan Yeager    "

## 2024-11-18 NOTE — LETTER
11/18/2024      Fallon Pizano  [address]  [facility] [address]      Dear Colleague,    Thank you for referring your patient, Fallon Pizano, to the [facility]UROS[facility] [place]. Please see a copy of my visit note below.    [facility] Neurosurgery  Neurosurgery Clinic Visit      CC: neck and arm pain    Primary care Provider: Zulma Leon    Reason For Visit:   I was asked by Dr. Leon to consult on the patient for cervical radiculopathy.    HPI: Fallon Pizano is a 59 year old female with a history of C4-7 ACDF with Dr. Arriaza on 3/24/2020. Was doing well postoperatively until about a month ago. She denies injury at the onset. She reports right-sided neck pain which radiates to the right arm and into the right 4th and 5th digits. Some intermittent numbness in associated fingers as well. Reporting weakness in right hand including difficulty writing (right handed), lifting a coffee cup, and opening/closing a sliding door. Has tried ibuprofen without much benefit.    Past Medical History:   Diagnosis Date     Acquired hypothyroidism 04/07/2017     Allergic rhinitis      CAD (coronary artery disease) 04/2011    AMI, LAD stent     Cervicalgia 09/12/2019     Chest pain      Hyperlipidemia LDL goal <70 09/10/2012     Infection due to 2019 novel coronavirus 12/2021     Major depression     related to CAD     MI (myocardial infarction) (H) 04/2011     Migraines      Mild episode of recurrent major depressive disorder (H) 09/10/2012     Myalgia 09/12/2019     Palpitations        Past Medical History reviewed with patient during visit.    Past Surgical History:   Procedure Laterality Date     ANGIOGRAM  04/2011    proximal LAD stent     COLONOSCOPY N/A 11/22/2017    Procedure: COLONOSCOPY;  Colonoscopy ;  Surgeon: Jaylon Botello MD;  Location:  GI     DISCECTOMY, FUSION CERVICAL ANTERIOR THREE+ LEVELS, COMBINED N/A 03/24/2020     Procedure: Cervical 4 to Cervical 7 Anterior Cervical Discectomy and Fusion;  Surgeon: Abdulkadir Arriaza MD;  Location: [facility] OR     HC ENLARGE BREAST WITH IMPLANT       Past Surgical History reviewed with patient during visit.    Current Outpatient Medications   Medication Sig Dispense Refill     alendronate (FOSAMAX) 70 MG tablet Take 1 tablet (70 mg) by mouth every 7 days 13 tablet 3     amLODIPine (NORVASC) 5 MG tablet Take 1 tablet (5 mg) by mouth daily 90 tablet 3     aspirin (ASA) 81 MG tablet Take 1 tablet (81 mg) by mouth daily 90 tablet 3     carvedilol (COREG) 6.25 MG tablet Take 1 tablet (6.25 mg) by mouth 2 times daily (with meals) 180 tablet 3     coenzyme Q-10 200 MG CAPS        desvenlafaxine (PRISTIQ) 50 MG 24 hr tablet Take 1 tablet (50 mg) by mouth daily. 90 tablet 2     fluticasone (FLONASE) 50 MCG/ACT nasal spray INSTILL 2 SPRAYS INTO BOTH NOSTRILS DAILY 48 mL 2     levothyroxine (SYNTHROID/LEVOTHROID) 25 MCG tablet TAKE 1 TABLET BY MOUTH EVERY OTHER DAY ALTERNATING WITH 50 MCG TABLETS 90 tablet 0     levothyroxine (SYNTHROID/LEVOTHROID) 50 MCG tablet TAKE 1 TABLET BY MOUTH EVERY OTHER DAY ALTERNATING WITH 25 MCG 45 tablet 1     lisinopril (ZESTRIL) 5 MG tablet Take 1 tablet (5 mg) by mouth daily. 90 tablet 2     nitroGLYcerin (NITROSTAT) 0.4 MG sublingual tablet Place 1 tablet (0.4 mg) under the tongue every 5 minutes as needed for chest pain. For chest pain place 1 tablet under the tongue every 5 minutes for 3 doses. If symptoms persist 5 minutes after 1st dose call 911. 25 tablet 0     rosuvastatin (CRESTOR) 40 MG tablet Take 1 tablet (40 mg) by mouth daily 90 tablet 3     zolpidem (AMBIEN) 5 MG tablet Take 1 tablet (5 mg) by mouth nightly as needed for sleep. 30 tablet 0     No current facility-administered medications for this visit.       Allergies   Allergen Reactions     Levaquin Anaphylaxis and Rash     Atorvastatin Muscle Pain (Myalgia)     Ciprofloxacin Unknown     Reacted to  Levofloxacin and was instructed to avoid all Flouroquinolones.  Other reaction(s): *Unknown       Flagyl [Metronidazole Hcl]      Doesn't remember the reaction     Levofloxacin Unknown     Instructed to avoid all fluoroquinolones  Other reaction(s): *Unknown         Social History     Socioeconomic History     Marital status:      Spouse name: None     Number of children: 1     Years of education: None     Highest education level: None   Occupational History     Employer: NONE    Tobacco Use     Smoking status: Former     Current packs/day: 0.00     Average packs/day: 0.3 packs/day for 26.0 years (6.5 ttl pk-yrs)     Types: Cigarettes     Start date: 1994     Quit date: 3/10/2020     Years since quittin.6     Passive exposure: Past     Smokeless tobacco: Never     Tobacco comments:     smokes occ   Vaping Use     Vaping status: Never Used   Substance and Sexual Activity     Alcohol use: Yes     Alcohol/week: 0.0 standard drinks of alcohol     Comment: Casual     Drug use: No     Sexual activity: Yes     Partners: Male   Other Topics Concern     Caffeine Concern Yes     Comment: 3+ daily     Sleep Concern No     Stress Concern No     Special Diet No     Comment: watches fats and sodium     Exercise No     Social Drivers of Health     Financial Resource Strain: Low Risk  (2024)    Financial Resource Strain      Within the past 12 months, have you or your family members you live with been unable to get utilities (heat, electricity) when it was really needed?: No   Food Insecurity: Low Risk  (2024)    Food Insecurity      Within the past 12 months, did you worry that your food would run out before you got money to buy more?: No      Within the past 12 months, did the food you bought just not last and you didn t have money to get more?: No   Transportation Needs: Low Risk  (2024)    Transportation Needs      Within the past 12 months, has lack of transportation kept you from medical  appointments, getting your medicines, non-medical meetings or appointments, work, or from getting things that you need?: No   Physical Activity: Insufficiently Active (11/9/2024)    Exercise Vital Sign      Days of Exercise per Week: 2 days      Minutes of Exercise per Session: 30 min   Stress: Stress Concern Present (11/9/2024)    [employer] [place] [place] [employer] - Occupational Stress Questionnaire      Feeling of Stress : To some extent   Social Connections: Unknown (11/9/2024)    Social Connection and Isolation Panel [NHANES]      Frequency of Social Gatherings with Friends and Family: Once a week   Interpersonal Safety: Low Risk  (11/12/2024)    Interpersonal Safety      Do you feel physically and emotionally safe where you currently live?: Yes      Within the past 12 months, have you been hit, slapped, kicked or otherwise physically hurt by someone?: No      Within the past 12 months, have you been humiliated or emotionally abused in other ways by your partner or ex-partner?: No   Housing Stability: Low Risk  (11/9/2024)    Housing Stability      Do you have housing? : Yes      Are you worried about losing your housing?: No       Family History   Problem Relation Age of Onset     Diabetes Mother      C.A.D. Mother      Heart Disease Mother         valve disease, pacer     Connective Tissue Disorder Mother         autoimmune disease     Gastrointestinal Disease Mother         cirrhosis, non alcoholic     C.A.D. Father 55         ROS: 10 point ROS neg other than the symptoms noted above in the HPI.    Vital Signs:   /78   Pulse 62   Temp (!) 96.5  F (35.8  C) (Temporal)   Resp 14   Wt 136 lb (61.7 kg)   LMP  (LMP Unknown)   BMI 24.09 kg/m        Examination:  Constitutional:  Alert, well nourished, NAD.  Memory: recent and remote memory   HEENT: Normocephalic, atraumatic.   Pulm:  Without shortness of breath   CV:  No pitting edema of BLE.      Neurological:  Awake  Alert  Oriented x 3  Speech  clear    Motor exam:   Shoulder Abduction:   Right:  5/5   Left:  5/5  Biceps:                        Right:  5/5   Left:  5/5  Triceps:                       Right:  5/5   Left:  5/5  Wrist Extensors:          Right:  5/5   Left:  5/5  Wrist Flexors:              Right:  5/5   Left:  5/5  Intrinsics:                     Right:  4+/5   Left:  5/5    Sensation normal to bilateral upper and lower extremities  Muscle tone to bilateral upper and lower extremities   Gait: Able to stand from a seated position. Normal non-antalgic, non-myelopathic gait.  Able to heel/toe walk without loss of balance    Cervical examination reveals expected limited range of motion.  No tenderness to palpation of the cervical spine or paraspinous muscles bilaterally.    Imaging:   No new imaging to review.    Assessment/Plan:   Cervical radiculopathy  Hx of cervical spine fusion    Due to concern for adjacent segment degeneration, will obtain cervical XR and MRI at this time. If cervical XR is without concern, ok to begin PT at that time. She verbalized understanding and agreement.     Patient Instructions   -Cervical xray to be completed today. I will contact you if the results are abnormal. If no fracture or concerning findings, will begin physical therapy.  -Cervical MRI ordered. They will contact you to schedule. I will contact you with the results and further recommendations.  -Please contact our clinic with questions or concerns at [phone].    Kindra Henson CNP  [facility] Neurosurgery  [address] [place] [id number]  Tel [phone]  Fax [phone]      Again, thank you for allowing me to participate in the care of your patient.        Sincerely,        Kindra Henson NP

## 2024-11-18 NOTE — PATIENT INSTRUCTIONS
-Cervical xray to be completed today. I will contact you if the results are abnormal. If no fracture or concerning findings, will begin physical therapy.  -Cervical MRI ordered. They will contact you to schedule. I will contact you with the results and further recommendations.  -Please contact our clinic with questions or concerns at [phone].

## 2024-11-18 NOTE — PROGRESS NOTES
[facility] Neurosurgery  Neurosurgery Clinic Visit      CC: neck and arm pain    Primary care Provider: Zulma Leon    Reason For Visit:   I was asked by Dr. Leon to consult on the patient for cervical radiculopathy.    HPI: Fallon Pizano is a 59 year old female with a history of C4-7 ACDF with Dr. Arriaza on 3/24/2020. Was doing well postoperatively until about a month ago. She denies injury at the onset. She reports right-sided neck pain which radiates to the right arm and into the right 4th and 5th digits. Some intermittent numbness in associated fingers as well. Reporting weakness in right hand including difficulty writing (right handed), lifting a coffee cup, and opening/closing a sliding door. Has tried ibuprofen without much benefit.    Past Medical History:   Diagnosis Date    Acquired hypothyroidism 04/07/2017    Allergic rhinitis     CAD (coronary artery disease) 04/2011    AMI, LAD stent    Cervicalgia 09/12/2019    Chest pain     Hyperlipidemia LDL goal <70 09/10/2012    Infection due to 2019 novel coronavirus 12/2021    Major depression     related to CAD    MI (myocardial infarction) (H) 04/2011    Migraines     Mild episode of recurrent major depressive disorder (H) 09/10/2012    Myalgia 09/12/2019    Palpitations        Past Medical History reviewed with patient during visit.    Past Surgical History:   Procedure Laterality Date    ANGIOGRAM  04/2011    proximal LAD stent    COLONOSCOPY N/A 11/22/2017    Procedure: COLONOSCOPY;  Colonoscopy ;  Surgeon: Jaylon Botello MD;  Location:  GI    DISCECTOMY, FUSION CERVICAL ANTERIOR THREE+ LEVELS, COMBINED N/A 03/24/2020    Procedure: Cervical 4 to Cervical 7 Anterior Cervical Discectomy and Fusion;  Surgeon: Abdulkadir Arriaza MD;  Location:  OR     ENLARGE BREAST WITH IMPLANT       Past Surgical History reviewed with patient during visit.    Current Outpatient Medications   Medication Sig Dispense Refill    alendronate  (FOSAMAX) 70 MG tablet Take 1 tablet (70 mg) by mouth every 7 days 13 tablet 3    amLODIPine (NORVASC) 5 MG tablet Take 1 tablet (5 mg) by mouth daily 90 tablet 3    aspirin (ASA) 81 MG tablet Take 1 tablet (81 mg) by mouth daily 90 tablet 3    carvedilol (COREG) 6.25 MG tablet Take 1 tablet (6.25 mg) by mouth 2 times daily (with meals) 180 tablet 3    coenzyme Q-10 200 MG CAPS       desvenlafaxine (PRISTIQ) 50 MG 24 hr tablet Take 1 tablet (50 mg) by mouth daily. 90 tablet 2    fluticasone (FLONASE) 50 MCG/ACT nasal spray INSTILL 2 SPRAYS INTO BOTH NOSTRILS DAILY 48 mL 2    levothyroxine (SYNTHROID/LEVOTHROID) 25 MCG tablet TAKE 1 TABLET BY MOUTH EVERY OTHER DAY ALTERNATING WITH 50 MCG TABLETS 90 tablet 0    levothyroxine (SYNTHROID/LEVOTHROID) 50 MCG tablet TAKE 1 TABLET BY MOUTH EVERY OTHER DAY ALTERNATING WITH 25 MCG 45 tablet 1    lisinopril (ZESTRIL) 5 MG tablet Take 1 tablet (5 mg) by mouth daily. 90 tablet 2    nitroGLYcerin (NITROSTAT) 0.4 MG sublingual tablet Place 1 tablet (0.4 mg) under the tongue every 5 minutes as needed for chest pain. For chest pain place 1 tablet under the tongue every 5 minutes for 3 doses. If symptoms persist 5 minutes after 1st dose call 911. 25 tablet 0    rosuvastatin (CRESTOR) 40 MG tablet Take 1 tablet (40 mg) by mouth daily 90 tablet 3    zolpidem (AMBIEN) 5 MG tablet Take 1 tablet (5 mg) by mouth nightly as needed for sleep. 30 tablet 0     No current facility-administered medications for this visit.       Allergies   Allergen Reactions    Levaquin Anaphylaxis and Rash    Atorvastatin Muscle Pain (Myalgia)    Ciprofloxacin Unknown     Reacted to Levofloxacin and was instructed to avoid all Flouroquinolones.  Other reaction(s): *Unknown      Flagyl [Metronidazole Hcl]      Doesn't remember the reaction    Levofloxacin Unknown     Instructed to avoid all fluoroquinolones  Other reaction(s): *Unknown         Social History     Socioeconomic History    Marital status:       Spouse name: None    Number of children: 1    Years of education: None    Highest education level: None   Occupational History     Employer: NONE    Tobacco Use    Smoking status: Former     Current packs/day: 0.00     Average packs/day: 0.3 packs/day for 26.0 years (6.5 ttl pk-yrs)     Types: Cigarettes     Start date: 1994     Quit date: 3/10/2020     Years since quittin.6     Passive exposure: Past    Smokeless tobacco: Never    Tobacco comments:     smokes occ   Vaping Use    Vaping status: Never Used   Substance and Sexual Activity    Alcohol use: Yes     Alcohol/week: 0.0 standard drinks of alcohol     Comment: Casual    Drug use: No    Sexual activity: Yes     Partners: Male   Other Topics Concern    Caffeine Concern Yes     Comment: 3+ daily    Sleep Concern No    Stress Concern No    Special Diet No     Comment: watches fats and sodium    Exercise No     Social Drivers of Health     Financial Resource Strain: Low Risk  (2024)    Financial Resource Strain     Within the past 12 months, have you or your family members you live with been unable to get utilities (heat, electricity) when it was really needed?: No   Food Insecurity: Low Risk  (2024)    Food Insecurity     Within the past 12 months, did you worry that your food would run out before you got money to buy more?: No     Within the past 12 months, did the food you bought just not last and you didn t have money to get more?: No   Transportation Needs: Low Risk  (2024)    Transportation Needs     Within the past 12 months, has lack of transportation kept you from medical appointments, getting your medicines, non-medical meetings or appointments, work, or from getting things that you need?: No   Physical Activity: Insufficiently Active (2024)    Exercise Vital Sign     Days of Exercise per Week: 2 days     Minutes of Exercise per Session: 30 min   Stress: Stress Concern Present (2024)    [employer]  Occupational Health - Occupational Stress Questionnaire     Feeling of Stress : To some extent   Social Connections: Unknown (11/9/2024)    Social Connection and Isolation Panel [NHANES]     Frequency of Social Gatherings with Friends and Family: Once a week   Interpersonal Safety: Low Risk  (11/12/2024)    Interpersonal Safety     Do you feel physically and emotionally safe where you currently live?: Yes     Within the past 12 months, have you been hit, slapped, kicked or otherwise physically hurt by someone?: No     Within the past 12 months, have you been humiliated or emotionally abused in other ways by your partner or ex-partner?: No   Housing Stability: Low Risk  (11/9/2024)    Housing Stability     Do you have housing? : Yes     Are you worried about losing your housing?: No       Family History   Problem Relation Age of Onset    Diabetes Mother     C.A.D. Mother     Heart Disease Mother         valve disease, pacer    Connective Tissue Disorder Mother         autoimmune disease    Gastrointestinal Disease Mother         cirrhosis, non alcoholic    C.A.D. Father 55         ROS: 10 point ROS neg other than the symptoms noted above in the HPI.    Vital Signs:   /78   Pulse 62   Temp (!) 96.5  F (35.8  C) (Temporal)   Resp 14   Wt 136 lb (61.7 kg)   LMP  (LMP Unknown)   BMI 24.09 kg/m        Examination:  Constitutional:  Alert, well nourished, NAD.  Memory: recent and remote memory   HEENT: Normocephalic, atraumatic.   Pulm:  Without shortness of breath   CV:  No pitting edema of BLE.      Neurological:  Awake  Alert  Oriented x 3  Speech clear    Motor exam:   Shoulder Abduction:   Right:  5/5   Left:  5/5  Biceps:                        Right:  5/5   Left:  5/5  Triceps:                       Right:  5/5   Left:  5/5  Wrist Extensors:          Right:  5/5   Left:  5/5  Wrist Flexors:              Right:  5/5   Left:  5/5  Intrinsics:                     Right:  4+/5   Left:  5/5    Sensation  normal to bilateral upper and lower extremities  Muscle tone to bilateral upper and lower extremities   Gait: Able to stand from a seated position. Normal non-antalgic, non-myelopathic gait.  Able to heel/toe walk without loss of balance    Cervical examination reveals expected limited range of motion.  No tenderness to palpation of the cervical spine or paraspinous muscles bilaterally.    Imaging:   No new imaging to review.    Assessment/Plan:   Cervical radiculopathy  Hx of cervical spine fusion    Due to concern for adjacent segment degeneration, will obtain cervical XR and MRI at this time. If cervical XR is without concern, ok to begin PT at that time. She verbalized understanding and agreement.     Patient Instructions   -Cervical xray to be completed today. I will contact you if the results are abnormal. If no fracture or concerning findings, will begin physical therapy.  -Cervical MRI ordered. They will contact you to schedule. I will contact you with the results and further recommendations.  -Please contact our clinic with questions or concerns at [phone].    Kindra Henson CNP  [facility] Neurosurgery  [address] [place] [id number]  Tel [phone]  Fax [phone]

## 2024-11-19 ENCOUNTER — HOSPITAL ENCOUNTER (OUTPATIENT)
Dept: MAMMOGRAPHY | Facility: CLINIC | Age: 59
Discharge: HOME | End: 2024-11-19
Attending: INTERNAL MEDICINE
Payer: COMMERCIAL

## 2024-11-19 DIAGNOSIS — Z12.31 ENCOUNTER FOR SCREENING MAMMOGRAM FOR BREAST CANCER: ICD-10-CM

## 2024-11-19 PROCEDURE — 77067 SCR MAMMO BI INCL CAD: CPT

## 2024-12-04 DIAGNOSIS — E03.9 ACQUIRED HYPOTHYROIDISM: ICD-10-CM

## 2024-12-04 RX ORDER — LEVOTHYROXINE SODIUM 25 UG/1
TABLET ORAL
Qty: 45 TABLET | Refills: 1 | Status: SHIPPED | OUTPATIENT
Start: 2024-12-04

## 2024-12-17 DIAGNOSIS — M81.0 AGE-RELATED OSTEOPOROSIS WITHOUT CURRENT PATHOLOGICAL FRACTURE: ICD-10-CM

## 2024-12-17 RX ORDER — ALENDRONATE SODIUM 70 MG/1
70 TABLET ORAL
Qty: 12 TABLET | Refills: 2 | Status: SHIPPED | OUTPATIENT
Start: 2024-12-17

## 2024-12-27 DIAGNOSIS — I25.10 CORONARY ARTERY DISEASE INVOLVING NATIVE CORONARY ARTERY OF NATIVE HEART WITHOUT ANGINA PECTORIS: ICD-10-CM

## 2024-12-27 DIAGNOSIS — G47.00 INSOMNIA, UNSPECIFIED TYPE: ICD-10-CM

## 2024-12-31 RX ORDER — NITROGLYCERIN 0.4 MG/1
TABLET SUBLINGUAL
Qty: 25 TABLET | Refills: 0 | Status: SHIPPED | OUTPATIENT
Start: 2024-12-31

## 2024-12-31 RX ORDER — ZOLPIDEM TARTRATE 5 MG/1
5 TABLET ORAL
Qty: 30 TABLET | Refills: 0 | Status: SHIPPED | OUTPATIENT
Start: 2024-12-31

## 2025-02-25 DIAGNOSIS — E03.9 ACQUIRED HYPOTHYROIDISM: ICD-10-CM

## 2025-02-26 RX ORDER — LEVOTHYROXINE SODIUM 50 UG/1
TABLET ORAL
Qty: 45 TABLET | Refills: 1 | Status: SHIPPED | OUTPATIENT
Start: 2025-02-26

## 2025-03-27 ENCOUNTER — MYC REFILL (OUTPATIENT)
Dept: INTERNAL MEDICINE | Facility: CLINIC | Age: 60
End: 2025-03-27
Payer: COMMERCIAL

## 2025-03-27 DIAGNOSIS — I25.10 CORONARY ARTERY DISEASE INVOLVING NATIVE CORONARY ARTERY OF NATIVE HEART WITHOUT ANGINA PECTORIS: ICD-10-CM

## 2025-03-27 DIAGNOSIS — E78.5 HYPERLIPIDEMIA LDL GOAL <70: ICD-10-CM

## 2025-03-27 RX ORDER — ROSUVASTATIN CALCIUM 40 MG/1
40 TABLET, COATED ORAL DAILY
Qty: 90 TABLET | Refills: 3 | OUTPATIENT
Start: 2025-03-27

## 2025-03-27 RX ORDER — CARVEDILOL 6.25 MG/1
6.25 TABLET ORAL 2 TIMES DAILY WITH MEALS
Qty: 180 TABLET | Refills: 3 | OUTPATIENT
Start: 2025-03-27

## 2025-06-07 DIAGNOSIS — E03.9 ACQUIRED HYPOTHYROIDISM: ICD-10-CM

## 2025-06-09 RX ORDER — LEVOTHYROXINE SODIUM 25 UG/1
TABLET ORAL
Qty: 45 TABLET | Refills: 0 | Status: SHIPPED | OUTPATIENT
Start: 2025-06-09

## 2025-06-18 DIAGNOSIS — F33.0 MILD EPISODE OF RECURRENT MAJOR DEPRESSIVE DISORDER: ICD-10-CM

## 2025-06-18 DIAGNOSIS — I25.10 CORONARY ARTERY DISEASE INVOLVING NATIVE CORONARY ARTERY OF NATIVE HEART WITHOUT ANGINA PECTORIS: ICD-10-CM

## 2025-06-18 RX ORDER — DESVENLAFAXINE 50 MG/1
50 TABLET, FILM COATED, EXTENDED RELEASE ORAL DAILY
Qty: 90 TABLET | Refills: 2 | Status: SHIPPED | OUTPATIENT
Start: 2025-06-18

## 2025-06-18 RX ORDER — AMLODIPINE BESYLATE 5 MG/1
5 TABLET ORAL DAILY
Qty: 90 TABLET | Refills: 1 | Status: SHIPPED | OUTPATIENT
Start: 2025-06-18

## 2025-08-06 DIAGNOSIS — I25.10 CORONARY ARTERY DISEASE INVOLVING NATIVE CORONARY ARTERY OF NATIVE HEART WITHOUT ANGINA PECTORIS: ICD-10-CM

## 2025-08-06 RX ORDER — LISINOPRIL 5 MG/1
5 TABLET ORAL DAILY
Qty: 90 TABLET | Refills: 2 | Status: SHIPPED | OUTPATIENT
Start: 2025-08-06

## (undated) DEVICE — LINEN TOWEL PACK X5 5464

## (undated) DEVICE — SOL WATER IRRIG 1000ML BOTTLE 2F7114

## (undated) DEVICE — SU MONOCRYL 4-0 PS-2 18" UND Y496G

## (undated) DEVICE — GLOVE PROTEXIS W/NEU-THERA 7.5  2D73TE75

## (undated) DEVICE — TUBING SUCTION SOFT 20'X3/16" 0036570

## (undated) DEVICE — ESU ELEC BLADE 2.75" COATED/INSULATED E1455

## (undated) DEVICE — MIDAS REX DISSECTING TOOL  14MH30

## (undated) DEVICE — DRAPE COVER C-ARM SEAMLESS SNAP-KAP 03-KP26 LATEX FREE

## (undated) DEVICE — GLOVE PROTEXIS BLUE W/NEU-THERA 8.0  2D73EB80

## (undated) DEVICE — GLOVE PROTEXIS W/NEU-THERA 8.5  2D73TE85

## (undated) DEVICE — SPONGE SURGIFOAM 100 1974

## (undated) DEVICE — ADH SKIN CLOSURE PREMIERPRO EXOFIN MICOR HV 0.5ML 3471

## (undated) DEVICE — PIN DISTRACTION ANCHOR FOR SCR 14MM MDS9091414

## (undated) DEVICE — Device

## (undated) DEVICE — KIT ENDO TURNOVER/PROCEDURE W/CLEAN A SCOPE LINERS 103888

## (undated) DEVICE — DRAPE MAYO STAND 23X54 8337

## (undated) DEVICE — DRAIN JACKSON PRATT 07MM FLAT SU130-1310

## (undated) DEVICE — GLOVE PROTEXIS BLUE W/NEU-THERA 8.5  2D73EB85

## (undated) DEVICE — RX SURGIFLO HEMOSTATIC MATRIX W/THROMBIN 8ML 2994

## (undated) DEVICE — PREP DURAPREP 06ML APL 8635

## (undated) DEVICE — ANTIFOG SOLUTION W/FOAM PAD CF-1001

## (undated) DEVICE — GLOVE PROTEXIS W/NEU-THERA 6.5  2D73TE65

## (undated) DEVICE — ESU GROUND PAD UNIVERSAL W/O CORD

## (undated) DEVICE — SU VICRYL 3-0 SH CR 8X18" J774

## (undated) DEVICE — PACK SPINE SM CUSTOM SNE15SSFSK

## (undated) DEVICE — GOWN XLG DISP 9545

## (undated) DEVICE — SPONGE KITTNER 31001010

## (undated) DEVICE — GLOVE PROTEXIS BLUE W/NEU-THERA 6.5  2D73EB65

## (undated) DEVICE — DRILL BIT MEDT 13MM SGL USE 7080513

## (undated) DEVICE — DRAIN JACKSON PRATT RESERVOIR 100ML SU130-1305

## (undated) DEVICE — MANIFOLD NEPTUNE 4 PORT 700-20

## (undated) DEVICE — DRAPE SHEET REV FOLD 3/4 9349

## (undated) DEVICE — DRAPE MICROSCOPE EQUIP 48X120" 6130VL2

## (undated) RX ORDER — FENTANYL CITRATE 0.05 MG/ML
INJECTION, SOLUTION INTRAMUSCULAR; INTRAVENOUS
Status: DISPENSED
Start: 2020-03-24

## (undated) RX ORDER — PROPOFOL 10 MG/ML
INJECTION, EMULSION INTRAVENOUS
Status: DISPENSED
Start: 2020-03-24

## (undated) RX ORDER — DEXAMETHASONE SODIUM PHOSPHATE 4 MG/ML
INJECTION, SOLUTION INTRA-ARTICULAR; INTRALESIONAL; INTRAMUSCULAR; INTRAVENOUS; SOFT TISSUE
Status: DISPENSED
Start: 2020-03-24

## (undated) RX ORDER — FENTANYL CITRATE 50 UG/ML
INJECTION, SOLUTION INTRAMUSCULAR; INTRAVENOUS
Status: DISPENSED
Start: 2017-11-22

## (undated) RX ORDER — GLYCOPYRROLATE 0.2 MG/ML
INJECTION, SOLUTION INTRAMUSCULAR; INTRAVENOUS
Status: DISPENSED
Start: 2020-03-24

## (undated) RX ORDER — LIDOCAINE HYDROCHLORIDE 20 MG/ML
INJECTION, SOLUTION EPIDURAL; INFILTRATION; INTRACAUDAL; PERINEURAL
Status: DISPENSED
Start: 2020-03-24

## (undated) RX ORDER — HYDROMORPHONE HYDROCHLORIDE 1 MG/ML
INJECTION, SOLUTION INTRAMUSCULAR; INTRAVENOUS; SUBCUTANEOUS
Status: DISPENSED
Start: 2020-03-24

## (undated) RX ORDER — FENTANYL CITRATE 50 UG/ML
INJECTION, SOLUTION INTRAMUSCULAR; INTRAVENOUS
Status: DISPENSED
Start: 2020-03-24

## (undated) RX ORDER — CEFAZOLIN SODIUM 2 G/100ML
INJECTION, SOLUTION INTRAVENOUS
Status: DISPENSED
Start: 2020-03-24

## (undated) RX ORDER — BUPIVACAINE HYDROCHLORIDE AND EPINEPHRINE 5; 5 MG/ML; UG/ML
INJECTION, SOLUTION EPIDURAL; INTRACAUDAL; PERINEURAL
Status: DISPENSED
Start: 2020-03-24

## (undated) RX ORDER — ONDANSETRON 2 MG/ML
INJECTION INTRAMUSCULAR; INTRAVENOUS
Status: DISPENSED
Start: 2020-03-24

## (undated) RX ORDER — NEOSTIGMINE METHYLSULFATE 1 MG/ML
VIAL (ML) INJECTION
Status: DISPENSED
Start: 2020-03-24

## (undated) RX ORDER — CEFAZOLIN SODIUM 1 G/3ML
INJECTION, POWDER, FOR SOLUTION INTRAMUSCULAR; INTRAVENOUS
Status: DISPENSED
Start: 2020-03-24

## (undated) RX ORDER — GABAPENTIN 300 MG/1
CAPSULE ORAL
Status: DISPENSED
Start: 2020-03-24

## (undated) RX ORDER — ACETAMINOPHEN 325 MG/1
TABLET ORAL
Status: DISPENSED
Start: 2020-03-24